# Patient Record
Sex: MALE
[De-identification: names, ages, dates, MRNs, and addresses within clinical notes are randomized per-mention and may not be internally consistent; named-entity substitution may affect disease eponyms.]

---

## 2021-04-14 ENCOUNTER — HOSPITAL ENCOUNTER (EMERGENCY)
Dept: HOSPITAL 97 - ER | Age: 56
Discharge: ANOTHER HOSPITAL | End: 2021-04-14
Payer: COMMERCIAL

## 2021-04-14 VITALS — OXYGEN SATURATION: 94 %

## 2021-04-14 VITALS — SYSTOLIC BLOOD PRESSURE: 129 MMHG | DIASTOLIC BLOOD PRESSURE: 89 MMHG

## 2021-04-14 VITALS — TEMPERATURE: 98.7 F

## 2021-04-14 DIAGNOSIS — J91.8: ICD-10-CM

## 2021-04-14 DIAGNOSIS — Z20.822: ICD-10-CM

## 2021-04-14 DIAGNOSIS — J18.9: Primary | ICD-10-CM

## 2021-04-14 DIAGNOSIS — D69.6: ICD-10-CM

## 2021-04-14 LAB
ALBUMIN SERPL BCP-MCNC: 3.2 G/DL (ref 3.4–5)
ALP SERPL-CCNC: 95 U/L (ref 45–117)
ALT SERPL W P-5'-P-CCNC: 28 U/L (ref 12–78)
ANISOCYTOSIS BLD QL: SLIGHT
AST SERPL W P-5'-P-CCNC: 19 U/L (ref 15–37)
BLD SMEAR INTERP: (no result)
BUN BLD-MCNC: 16 MG/DL (ref 7–18)
GLUCOSE SERPLBLD-MCNC: 129 MG/DL (ref 74–106)
HCT VFR BLD CALC: 34.3 % (ref 39.6–49)
INR BLD: 1.42
LYMPHOCYTES # SPEC AUTO: 1.6 K/UL (ref 0.7–4.9)
MACROCYTES BLD QL: SLIGHT
MAGNESIUM SERPL-MCNC: 2.3 MG/DL (ref 1.8–2.4)
MORPHOLOGY BLD-IMP: (no result)
NT-PROBNP SERPL-MCNC: 99 PG/ML (ref ?–125)
PMV BLD: 8.9 FL (ref 7.6–11.3)
POTASSIUM SERPL-SCNC: 3.8 MMOL/L (ref 3.5–5.1)
RBC # BLD: 3.39 M/UL (ref 4.33–5.43)
TROPONIN (EMERG DEPT USE ONLY): < 0.02 NG/ML (ref 0–0.04)

## 2021-04-14 PROCEDURE — 84145 PROCALCITONIN (PCT): CPT

## 2021-04-14 PROCEDURE — 93005 ELECTROCARDIOGRAM TRACING: CPT

## 2021-04-14 PROCEDURE — 99285 EMERGENCY DEPT VISIT HI MDM: CPT

## 2021-04-14 PROCEDURE — 36415 COLL VENOUS BLD VENIPUNCTURE: CPT

## 2021-04-14 PROCEDURE — 85610 PROTHROMBIN TIME: CPT

## 2021-04-14 PROCEDURE — 83605 ASSAY OF LACTIC ACID: CPT

## 2021-04-14 PROCEDURE — 71045 X-RAY EXAM CHEST 1 VIEW: CPT

## 2021-04-14 PROCEDURE — 83735 ASSAY OF MAGNESIUM: CPT

## 2021-04-14 PROCEDURE — 71260 CT THORAX DX C+: CPT

## 2021-04-14 PROCEDURE — 85025 COMPLETE CBC W/AUTO DIFF WBC: CPT

## 2021-04-14 PROCEDURE — 83880 ASSAY OF NATRIURETIC PEPTIDE: CPT

## 2021-04-14 PROCEDURE — 84484 ASSAY OF TROPONIN QUANT: CPT

## 2021-04-14 PROCEDURE — 87040 BLOOD CULTURE FOR BACTERIA: CPT

## 2021-04-14 PROCEDURE — 80048 BASIC METABOLIC PNL TOTAL CA: CPT

## 2021-04-14 PROCEDURE — 80076 HEPATIC FUNCTION PANEL: CPT

## 2021-04-14 NOTE — XMS REPORT
Continuity of Care Document

                            Created on:2021



Patient:TAQUERIA BELCHER

Sex:Male

:1965

External Reference #:[id number]





Demographics







                          Address                   [address] [place] [address]

 

                          Home Phone                [phone]

 

                          Mobile Phone              [phone]

 

                          Email Address             [email or web address]

 

                          Preferred Language        English

 

                          Marital Status            Unknown

 

                          Taoist Affiliation     Unknown

 

                          Race                      Unknown

 

                          Additional Race(s)        Unavailable



                                                    White

 

                          Ethnic Group               or 









Author







                          Organization              [facility]

t

 

                          Address                   [address]



                                                    [place] [address]

 

                          Phone                     [phone]









Support







                Name            Relationship    Address         Phone

 

                Oralia Belcher  Spouse          Unavailable     [phone]









Care Team Providers







                    Name                Role                Phone

 

                    Po, [facility] Attending Clinician Unavailable









Problems

This patient has no known problems.



Allergies, Adverse Reactions, Alerts

This patient has no known allergies or adverse reactions.



Medications

This patient has no known medications.



Procedures

This patient has no known procedures.



Encounters







        Start   End     Encounter Admission Attending Care    Care    Encounter 

Source



        Date/Time Date/Time Type    Type    Clinicians Facility Department ID   

   

 

        2020 Urgent          Pob1, Acute [facility]    [id number]

[id number] 



        10:46:27 11:06:27 [facility]  [id number]       

  



                                                [place] [id number]         



                                                [place] [phone]         



                                                nal     044             



                                                Office                  



                                                Building                 



                                                One                     







Results

This patient has no known results.

## 2021-04-14 NOTE — ER
Nurse's Notes                                                                                     

 [facility]                                                                 

Name: Corby Cotter                                                                               

Age: 56 yrs                                                                                       

Sex: Male                                                                                         

: 1965                                                                                   

MRN: [id number]                                                                                   

Arrival Date: 2021                                                                          

Time: 11:10                                                                                       

Account#: [id number]                                                                            

Bed 2                                                                                             

Private MD: Quan Sheirff T                                                                        

Diagnosis: Large Loculated pleural effusion suspicious for empyema;Pneumonia, unspecified         

  organism;Thrombocytopenia, unspecified                                                          

                                                                                                  

Presentation:                                                                                     

                                                                                             

11:23 Chief complaint: Patient states: L lateral trunk pain since Saturday night. Thought he  ll1 

      might have coughed or sneezed when the pain started. Pain and SOB has gotten worse          

      since. Sent by Dr. Sheriff for further eval. Coronavirus screen: Client denies travel out      

      of the [place] in the last 14 days. At this time, the client does not indicate any             

      symptoms associated with coronavirus-19. Ebola Screen: Patient denies travel to an          

      Ebola-affected area in the 21 days before illness onset. Initial Sepsis Screen: Does        

      the patient meet any 2 criteria? HR > 90 bpm. No. Patient's initial sepsis screen is        

      negative. Does the patient have a suspected source of infection? Yes: Other: Trunk/lung     

      pain/SOB. Risk Assessment: Do you want to hurt yourself or someone else? Patient            

      reports no desire to harm self or others. Onset of symptoms was April 10, 2021.             

11:23 Method Of Arrival: Ambulatory                                                           [facility] 

11:23 Acuity: NIURKA 2                                                                           ll1 

                                                                                                  

Historical:                                                                                       

- Allergies:                                                                                      

11:25 No Known Allergies;                                                                     ll1 

- PMHx:                                                                                           

11:25 flomax to help him pee;                                                                 ll1 

- PSHx:                                                                                           

11:25 None;                                                                                   ll1 

                                                                                                  

- Immunization history:: Client reports receiving the 2nd dose of the Covid vaccine,              

  Flu vaccine is not up to date.                                                                  

- Social history:: Smoking status: Patient denies any tobacco usage or history of.                

                                                                                                  

                                                                                                  

Screenin:05 Abuse screen: Denies threats or abuse. Denies injuries from another. Nutritional        iw  

      screening: No deficits noted. Tuberculosis screening: No symptoms or risk factors           

      identified. Fall Risk IV access (20 points).                                                

                                                                                                  

Assessment:                                                                                       

11:54 General: Appears uncomfortable, ill, Behavior is cooperative. Pain: Complains of pain   iw  

      in left lateral aspect of the back Pain currently is 8 out of 10 on a pain scale.           

      Neuro: Level of Consciousness is awake, alert, obeys commands, Oriented to person,          

      place, time, situation, Moves all extremities. Cardiovascular: Patient's skin is warm       

      and dry. Respiratory: Respiratory effort is labored, Respiratory pattern is tachypnea.      

      Derm: Skin is healthy with good turgor, Skin is pale. Musculoskeletal: Range of motion:     

      intact in all extremities.                                                                  

12:52 Reassessment: pt requesting pain medication and water, pt advised that he is NPO,       pam Hunt NP notified about pain, verbal order for morphine 4 mg IVP and zofran 4mg IVP,     

      given now.                                                                                  

14:00 Reassessment: Patient appears in no apparent distress at this time. Patient and/or      iw  

      family updated on plan of care and expected duration. Pain level reassessed.                

      Respiratory: Respiratory effort is even, labored, Respiratory pattern is tachypnea.         

18:00 Reassessment: pt states having increased pain to left back, more so when he coughs,     pam Rutledge NP notified, order for Dilaudid 2 mg IVPB over 1 hour and Phenergan 6.25 mg        

      IVP, started now.                                                                           

20:32 Reassessment: report given to  EMS. patient in stable condition, IV cannula patent.   mg2 

                                                                                                  

Vital Signs:                                                                                      

11:23  / 86; Pulse 136; Resp 28; Temp 98.6; Pulse Ox 95% ; Weight 112.49 kg; Height 6   ll1 

      ft. 3 in. (190.50 cm); Pain 9/10;                                                           

12:53  / 84; Pulse 129; Resp 24 S; Pulse Ox 98% on R/A;                                 iw  

13:16  / 84; Pulse 135; Resp 24; Pulse Ox 96% ;                                         sv  

14:08  / 91; Pulse 143; Resp 24; Temp 98.7(TE); Pulse Ox 99% ;                          iw  

15:35  / 74; Pulse 126; Resp 30 S; Pulse Ox 96% on R/A;                                 iw  

18:28  / 88; Pulse 131; Resp 26 S; Pulse Ox 94% on R/A;                                 iw  

20:32  / 89; Pulse 130; Resp 26; Pulse Ox 94% on 4 lpm NC;                              mg2 

11:23 Body Mass Index 31.00 (112.49 kg, 190.50 cm)                                            ll1 

                                                                                                  

ED Course:                                                                                        

11:10 Patient arrived in ED.                                                                  mr  

11:10 Quan Sheriff MD is Private Physician.                                                   mr  

11:25 Triage completed.                                                                       ll1 

11:25 Arm band placed on Patient placed in an exam room, on a stretcher.                      ll1 

11:30 Arina Beckford, MAXINE is Primary Nurse.                                                   iw  

11:31 Maty Cabrera FNP-C is PHCP.                                                        kb  

11:31 Valdemar Russo MD is Attending Physician.                                              kb  

11:47 XRAY Chest (1 view) In Process Unspecified.                                             EDMS

11:47 X-ray completed. Portable x-ray completed in exam room. Patient tolerated procedure     mh1 

      well.                                                                                       

11:55 Inserted saline lock: 20 gauge in left antecubital area, using aseptic technique.       iw  

12:40 CT Chest W/ Con In Process Unspecified.                                                 EDMS

13:43 initiated transfer to [facility].                                                   bd  

13:49 pt was denied due to [facility] and [facility] being at capacity at this time. bd  

13:53 initiated transfer to [facility].                                                   bd  

14:03 pt denied due to entire [facility] system being on transfer closure.                           bd  

15:11 initiated transfer to [facility].                                                      bd  

15:34 yvonne from [facility] call to ask for "2 15 min extentions".                                bd  

15:35 initiated transfer to [facility].                                              bd  

16:07 pt denied at [facility], [facility] and [facility] due to being at capacity at this      bd  

      time. per Yvonne. Yvonne will check [facility] for capacity.                              

16:10 pt denied at [facility] due to no capacity, per Yvonne.                                     bd  

16:32 initiated transfer to [facility].                                            bd  

16:47 pt denied at [facility] due to no capacity, per Yvonne.                                bd  

18:35 spoke with Bjorn at [facility], waiting on a PCU bed.                    bd  

19:26 administrative approval given by Eloisa Mcguire/ patient has been accepted to [facility] 

       7 [facility] 4/ Dr. Bauman accepted the patient in transfer/ report to be called to     

      [phone].                                                                               

20:32 Patient has correct armband on for positive identification.                             mg2 

20:32 No provider procedures requiring assistance completed. Patient transferred, IV remains  mg2 

      in place.                                                                                   

                                                                                                  

Administered Medications:                                                                         

12:54 Drug: morphine 4 mg Route: IVP; Site: left antecubital;                                 iw  

13:30 Follow up: Response: No adverse reaction                                                iw  

12:55 Drug: Zofran (Ondansetron) 4 mg Route: IVP; Site: left antecubital;                     iw  

13:30 Follow up: Response: No adverse reaction                                                iw  

14:18 Drug: Zosyn 3.375 grams Route: IVPB; Infused Over: 60 mins; Site: left antecubital;     iw  

15:18 Follow up: IV Status: Completed infusion                                                iw  

14:18 Drug: NS 0.9% 1000 ml Route: IV; Rate: 1000 ml; Site: left antecubital;                 iw  

15:18 Follow up: IV Status: Completed infusion                                                iw  

14:32 Drug: NS 0.9% 1000 ml Route: IV; Rate: 1000 ml; Site: left antecubital;                 iw  

15:32 Follow up: IV Status: Completed infusion                                                iw  

15:00 Drug: vancoMYCIN 1 grams Route: IVPB; Infused Over: 2 hrs; Site: left wrist;            iw  

17:00 Follow up: IV Status: Completed infusion                                                iw  

15:34 Drug: morphine 4 mg Route: IVP; Site: left antecubital;                                 iw  

16:00 Follow up: Response: No adverse reaction; Pain is decreased                             iw  

15:34 Drug: Zofran (Ondansetron) 4 mg Route: IVP; Site: left antecubital;                     iw  

16:00 Follow up: Response: No adverse reaction                                                iw  

17:59 Drug: Dilaudid (HYDROmorphone) 2 mg Route: IVP; Infused Over: 1 hrs; Site: left         iw  

      antecubital;                                                                                

18:00 Drug: Phenergan 6.25 mg Route: IVP; Site: left antecubital;                             iw  

19:13 Follow up: Response: No adverse reaction                                                iw  

                                                                                                  

                                                                                                  

Outcome:                                                                                          

13:40 ER care complete, transfer ordered by MD. persaud  

20:32 Transferred by ground EMS to [facility], Transfer form completed.             mg2 

20:32 Condition: stable                                                                           

20:32 Instructed on the need for transfer, Demonstrated understanding of instructions.            

20:33 Patient left the ED.                                                                    mg2 

                                                                                                  

Signatures:                                                                                       

Dispatcher [employer]                           EDMS                                                 

Maty Cabrera, [place] MCMILLAN-Cary Lua Stephanie, RN RN                                                      

Belinda Reina                                 mr                                                   

Patricia Mota                               1                                                  

Arina Beckford RN RN                                                      

Gabby Martin                            2                                                  

Mekhi Kilgore, RN                    RN   mg2                                                  

Kathryn Churchill RN                       RN   ll1                                                  

                                                                                                  

Corrections: (The following items were deleted from the chart)                                    

14:25 14:08  / 91; Pulse 143bpm; Resp 24bpm; Pulse Ox 99%; sv                           iw  

19:09 18:28  / 88; Pulse 131bpm; Resp 16bpm; Pulse Ox 94% RA; iw                        iw  

                                                                                                  

**************************************************************************************************

## 2021-04-14 NOTE — EDPHYS
Physician Documentation                                                                           

 [facility]                                                                 

Name: Corby Cotter                                                                               

Age: 56 yrs                                                                                       

Sex: Male                                                                                         

: 1965                                                                                   

MRN: [id number]                                                                                   

Arrival Date: 2021                                                                          

Time: 11:10                                                                                       

Account#: [id number]                                                                            

Bed 2                                                                                             

Private MD: Quan Sheriff T                                                                        

ED Physician Valdemar Russo                                                                       

HPI:                                                                                              

                                                                                             

13:39 This 56 yrs old  Male presents to ER via Ambulatory with complaints of Back     kb  

      Pain.                                                                                       

13:40 The patient has shortness of breath at rest. Onset: The symptoms/episode began/occurred kb  

      5 day(s) ago. Duration: The symptoms are continuous. The patient's shortness of breath      

      is aggravated by exertion, light activity, is alleviated by rest. Associated signs and      

      symptoms: Pertinent positives: chest pain. Severity of symptoms: At their worst the         

      symptoms were severe in the emergency department the symptoms are unchanged. The            

      patient has not experienced similar symptoms in the past. The patient has been recently     

      seen by a physician: the patient's primary care provider. Pt reports he started having      

      left, lower chest pain on Saturday night, then shortness of breath. States he couldn't      

      get into a comfortable position, was having pain with all movement and breathing. Went      

      to PCP on Monday and was given antiinflammatories and muscle relaxers. Chest x-ray was      

      ordered but pt states "I didn't have it done because I could barely walk at that point      

      and thought it would get better with the meds." Shortness of breath and pain continued      

      to worsen. .                                                                                

                                                                                                  

Historical:                                                                                       

- Allergies:                                                                                      

11:25 No Known Allergies;                                                                     ll1 

- PMHx:                                                                                           

11:25 flomax to help him pee;                                                                 ll1 

- PSHx:                                                                                           

11:25 None;                                                                                   ll1 

                                                                                                  

- Immunization history:: Client reports receiving the 2nd dose of the Covid vaccine,              

  Flu vaccine is not up to date.                                                                  

- Social history:: Smoking status: Patient denies any tobacco usage or history of.                

                                                                                                  

                                                                                                  

ROS:                                                                                              

13:37 Constitutional: Negative for fever, chills, and weight loss, Abdomen/GI: Negative for   kb  

      abdominal pain, nausea, vomiting, diarrhea, and constipation, MS/Extremity: Negative        

      for injury and deformity, Skin: Negative for injury, rash, and discoloration, Neuro:        

      Negative for headache, weakness, numbness, tingling, and seizure.                           

13:37 Cardiovascular: Positive for chest pain, of the left lateral anterior chest and left        

      lateral posterior chest.                                                                    

13:37 Respiratory: Positive for orthopnea, pleurisy, shortness of breath.                         

                                                                                                  

Exam:                                                                                             

13:38 Head/Face:  Normocephalic, atraumatic. Chest/axilla:  Normal chest wall appearance and  kb  

      motion.  Cardiovascular:  Regular rate and rhythm with a normal S1 and S2.  No gallops,     

      murmurs, or rubs.  No pulse deficits. Abdomen/GI:  Soft, non-tender. No distention          

      Skin:  Warm, dry with normal turgor.  Normal color. MS/ Extremity:  Pulses equal, no        

      cyanosis.  Neurovascular intact.  Full, normal range of motion. Neuro:  Awake and           

      alert, GCS 15, oriented to person, place, time, and situation. Moves all extremities.       

      Normal gait.                                                                                

13:38 Respiratory: moderate respiratory distress is noted,  Respirations: labored breathing,      

      that is moderate, Breath sounds: decreased breath sounds, that are severe, are heard in     

      the  left lower lobe and left posterior lower lobe.                                         

18:04 ECG was reviewed by the Attending Physician.                                            kb  

                                                                                                  

Vital Signs:                                                                                      

11:23  / 86; Pulse 136; Resp 28; Temp 98.6; Pulse Ox 95% ; Weight 112.49 kg; Height 6   ll1 

      ft. 3 in. (190.50 cm); Pain 9/10;                                                           

12:53  / 84; Pulse 129; Resp 24 S; Pulse Ox 98% on R/A;                                 iw  

13:16  / 84; Pulse 135; Resp 24; Pulse Ox 96% ;                                         sv  

14:08  / 91; Pulse 143; Resp 24; Temp 98.7(TE); Pulse Ox 99% ;                          iw  

15:35  / 74; Pulse 126; Resp 30 S; Pulse Ox 96% on R/A;                                 iw  

18:28  / 88; Pulse 131; Resp 26 S; Pulse Ox 94% on R/A;                                 iw  

20:32  / 89; Pulse 130; Resp 26; Pulse Ox 94% on 4 lpm NC;                              mg2 

11:23 Body Mass Index 31.00 (112.49 kg, 190.50 cm)                                            ll1 

                                                                                                  

MDM:                                                                                              

11:31 Patient medically screened.                                                               

13:21 Data reviewed: vital signs, nurses notes. Data interpreted: Pulse oximetry: on room air kb  

      is 96 %. Interpretation: normal. Physician consultation: Miguelangel Amin MD was             

      contacted at 13:21, regarding consult, patient's condition, after a discussion of the       

      case, a recommendation for transfer for higher level of care is made, recommended zosyn     

      and vancomycin now, transfer to hospital with thoracic surgery .                            

13:38 Counseling: I had a detailed discussion with the patient and/or guardian regarding: the kb  

      historical points, exam findings, and any diagnostic results supporting the                 

      discharge/admit diagnosis, lab results, radiology results, the need to transfer to          

      another facility, [facility] does not immediately have the required       

      specialist.                                                                                 

17:20 ED course: I have discussed pt condition with Haydee Black and Brennan.          kb  

      Recommendation is transfer. Transfer has been denied to [facility], [facility], [facility], [facility]. Dr Hubbard is in touch with Dr Griffin (transfer      

      center medical director for [facility]) to find placement for pt..                            

17:32 ED course: Thoracic surgeon at [facility] [place] accepts pt for consult. Awaiting hospitalist call.   kb  

17:54 ED course: Dr Mattson (hospitalist at [facility]) accepts pt for transfer to PCU or ICU   kb  

      bed. Does not want pt on the tele floor.                                                    

                                                                                                  

                                                                                             

11:36 Order name: Basic Metabolic Panel                                                         

                                                                                             

11:36 Order name: CBC with Diff; Complete Time: 12:51                                         kb  

                                                                                             

11:36 Order name: LFT's                                                                         

                                                                                             

11:36 Order name: Magnesium                                                                     

                                                                                             

11:36 Order name: NT PRO-BNP; Complete Time: 12:14                                              

                                                                                             

11:36 Order name: PT-INR; Complete Time: 12:28                                                  

                                                                                             

11:36 Order name: Troponin (emerg Dept Use Only); Complete Time: 12:14                        kb  

                                                                                             

11:36 Order name: Basic Metabolic Panel; Complete Time: 12:14                                 EDMS

                                                                                             

11:37 Order name: Liver (Hepatic) Function; Complete Time: 12:14                              EDMS

                                                                                             

11:37 Order name: Magnesium; Complete Time: 12:14                                             EDMS

                                                                                             

12:14 Order name: CBC Smear Scan; Complete Time: 12:51                                        EDMS

                                                                                             

13:12 Order name: Lactate                                                                       

                                                                                             

13:12 Order name: Procalcitonin                                                               kb  

                                                                                             

13:12 Order name: Blood Culture Adult (2)                                                     kb  

                                                                                             

11:36 Order name: XRAY Chest (1 view); Complete Time: 12:15                                   kb  

                                                                                             

11:49 Order name: CT Chest W/ Con; Complete Time: 13:10                                       kb  

                                                                                             

13:12 Order name: Lactate; Complete Time: 14:07                                               EDMS

                                                                                             

13:12 Order name: Procalcitonin; Complete Time: 14:46                                         EDMS

                                                                                             

14:44 Order name: SARS-COV-2 RT PCR; Complete Time: 14:46                                     EDMS

                                                                                             

11:36 Order name: EKG; Complete Time: 11:37                                                   kb  

                                                                                             

11:36 Order name: Cardiac monitoring; Complete Time: 11:50                                    kb  

                                                                                             

11:36 Order name: EKG - Nurse/Tech; Complete Time: 11:50                                      kb  

                                                                                             

11:36 Order name: IV Saline Lock; Complete Time: 11:50                                        kb  

                                                                                             

11:36 Order name: Labs collected and sent; Complete Time: 11:50                               kb  

                                                                                             

11:36 Order name: O2 Per Protocol; Complete Time: 11:50                                       kb  

                                                                                             

11:36 Order name: O2 Sat Monitoring; Complete Time: 11:50                                     kb  

                                                                                                  

EC:04 Rate is 134 beats/min. Rhythm is regular. QRS Axis is Normal. [place] interval is normal at  kb  

      132 msec. QRS interval is normal at 68 msec. QT interval is normal at 282 msec.             

                                                                                                  

Administered Medications:                                                                         

12:54 Drug: morphine 4 mg Route: IVP; Site: left antecubital;                                 iw  

13:30 Follow up: Response: No adverse reaction                                                iw  

12:55 Drug: Zofran (Ondansetron) 4 mg Route: IVP; Site: left antecubital;                     iw  

13:30 Follow up: Response: No adverse reaction                                                iw  

14:18 Drug: Zosyn 3.375 grams Route: IVPB; Infused Over: 60 mins; Site: left antecubital;     iw  

15:18 Follow up: IV Status: Completed infusion                                                iw  

14:18 Drug: NS 0.9% 1000 ml Route: IV; Rate: 1000 ml; Site: left antecubital;                 iw  

15:18 Follow up: IV Status: Completed infusion                                                iw  

14:32 Drug: NS 0.9% 1000 ml Route: IV; Rate: 1000 ml; Site: left antecubital;                 iw  

15:32 Follow up: IV Status: Completed infusion                                                iw  

15:00 Drug: vancoMYCIN 1 grams Route: IVPB; Infused Over: 2 hrs; Site: left wrist;            iw  

17:00 Follow up: IV Status: Completed infusion                                                iw  

15:34 Drug: morphine 4 mg Route: IVP; Site: left antecubital;                                 iw  

16:00 Follow up: Response: No adverse reaction; Pain is decreased                             iw  

15:34 Drug: Zofran (Ondansetron) 4 mg Route: IVP; Site: left antecubital;                     iw  

16:00 Follow up: Response: No adverse reaction                                                iw  

17:59 Drug: Dilaudid (HYDROmorphone) 2 mg Route: IVP; Infused Over: 1 hrs; Site: left         iw  

      antecubital;                                                                                

18:00 Drug: Phenergan 6.25 mg Route: IVP; Site: left antecubital;                             iw  

19:13 Follow up: Response: No adverse reaction                                                iw  

                                                                                                  

                                                                                                  

Disposition:                                                                                      

04/15                                                                                             

07:27 Co-signature as Attending Physician, Valdemar Russo MD I agree with the assessment and   kdr 

      plan of care.                                                                               

                                                                                                  

Disposition:                                                                                      

21 13:40 Transfer ordered to [facility]. Diagnosis are Large     

  Loculated pleural effusion suspicious for empyema, Pneumonia, unspecified                       

  organism, Thrombocytopenia, unspecified.                                                        

- Reason for transfer: Higher level of care.                                                      

- Accepting physician is [facility].                                                                

- Condition is Fair.                                                                              

- Problem is new.                                                                                 

- Symptoms are unchanged.                                                                         

                                                                                                  

                                                                                                  

                                                                                                  

Signatures:                                                                                       

Dispatcher [employer]                           [facility]                                                 

Maty Cabrera, MELISSAP-C                 FNP-CkValdemar Pagan MD MD   [facility]                                                  

Arina Beckford RN RN                                                      

Mekhi Kilgore, MAXINE GOODMAN   mg2                                                  

Kathryn Churchill RN                       RN   ll1                                                  

                                                                                                  

Corrections: (The following items were deleted from the chart)                                    

                                                                                             

13:52 13:33 CORONAVIRUS+MR.LAB.BRZ ordered. [facility]

17:34 13:40 2021 13:40 Transfer ordered to [facility]. Diagnosis is Large Loculated    kb  

      pleural effusion suspicious for empyema; Pneumonia, unspecified organism;                   

      Thrombocytopenia, unspecified. Reason for transfer: Higher level of care. Accepting         

      physician is [facility]. Condition is Fair. Problem is new. Symptoms are unchanged. kb            

20:33 17:34 2021 13:40 Transfer ordered to [facility].       mg2 

      Diagnosis is Large Loculated pleural effusion suspicious for empyema; Pneumonia,            

      unspecified organism; Thrombocytopenia, unspecified. Reason for transfer: Higher level      

      of care. Accepting physician is [facility]. Condition is Fair. Problem is new. Symptoms       

      are unchanged. kb                                                                           

                                                                                                  

**************************************************************************************************

## 2021-04-14 NOTE — RAD REPORT
EXAM DESCRIPTION:  RAD - Chest Single View - 4/14/2021 11:46 am

 

CLINICAL HISTORY:  Chest pain;Dyspnea

 

COMPARISON:  None

 

TECHNIQUE:  AP portable chest image was obtained 4/14/2021 11:46 am .

 

FINDINGS:  Lung volumes are low limiting evaluation. Left-sided pleural opacification is present from
 pleural thickening or pleural effusion. Lung base atelectasis is present. Exam is labeled as expirat
ion study. No pneumothorax is identified. Small right pleural effusion is likely present as well.

 

Masslike density in the left upper mediastinum is probably aortic arch distorted by expiration, melody
ble technique and rotation. No acute bony abnormality seen. No acute aortic findings suspected.

 

IMPRESSION:  Limited shallow inspiration exam showing left base pleural and parenchymal opacification
. In the acute clinical setting this is likely pleural effusion and atelectasis.

 

Widening of the mediastinum may be the affects of expiration and portable technique. Mass of the medi
astinum is not excluded.

 

No pneumothorax.

 

Follow-up CT chest imaging is recommended for further evaluation.

## 2021-04-15 NOTE — EKG
Test Date:    2021-04-14               Test Time:    11:36:59

Technician:   MT                                     

                                                     

MEASUREMENT RESULTS:                                       

Intervals:                                           

Rate:         134                                    

CA:           132                                    

QRSD:         68                                     

QT:           282                                    

QTc:          421                                    

Axis:                                                

P:            29                                     

CA:           132                                    

QRS:          26                                     

T:            89                                     

                                                     

INTERPRETIVE STATEMENTS:                                       

                                                     

Sinus tachycardia

Possible Left atrial enlargement

Septal infarct, age undetermined

Abnormal ECG

No previous ECG available for comparison



Electronically Signed On 04-15-21 07:34:47 CDT by Darwin Jorge

## 2021-06-11 ENCOUNTER — HOSPITAL ENCOUNTER (OUTPATIENT)
Dept: HOSPITAL 97 - DS | Age: 56
Discharge: HOME | End: 2021-06-11
Attending: INTERNAL MEDICINE
Payer: COMMERCIAL

## 2021-06-11 VITALS — OXYGEN SATURATION: 98 % | SYSTOLIC BLOOD PRESSURE: 112 MMHG | TEMPERATURE: 98.6 F | DIASTOLIC BLOOD PRESSURE: 67 MMHG

## 2021-06-11 VITALS — BODY MASS INDEX: 30.4 KG/M2

## 2021-06-11 DIAGNOSIS — D69.6: Primary | ICD-10-CM

## 2021-06-11 LAB
ANISOCYTOSIS BLD QL: (no result)
BLD SMEAR INTERP: (no result)
HCT VFR BLD CALC: 23.2 % (ref 39.6–49)
LYMPHOCYTES # SPEC AUTO: 2.9 K/UL (ref 0.7–4.9)
MACROCYTES BLD QL: (no result)
MORPHOLOGY BLD-IMP: (no result)
PMV BLD: 7.8 FL (ref 7.6–11.3)
PMV BLD: 8.3 FL (ref 7.6–11.3)
RBC # BLD: 2.2 M/UL (ref 4.33–5.43)

## 2021-06-11 PROCEDURE — 86850 RBC ANTIBODY SCREEN: CPT

## 2021-06-11 PROCEDURE — 85025 COMPLETE CBC W/AUTO DIFF WBC: CPT

## 2021-06-11 PROCEDURE — 36415 COLL VENOUS BLD VENIPUNCTURE: CPT

## 2021-06-11 PROCEDURE — 86901 BLOOD TYPING SEROLOGIC RH(D): CPT

## 2021-06-11 PROCEDURE — 86900 BLOOD TYPING SEROLOGIC ABO: CPT

## 2021-06-11 PROCEDURE — 85049 AUTOMATED PLATELET COUNT: CPT

## 2021-06-11 PROCEDURE — 36430 TRANSFUSION BLD/BLD COMPNT: CPT

## 2021-07-29 ENCOUNTER — HOSPITAL ENCOUNTER (OUTPATIENT)
Dept: HOSPITAL 97 - DS | Age: 56
Discharge: HOME | End: 2021-07-29
Attending: INTERNAL MEDICINE
Payer: COMMERCIAL

## 2021-07-29 VITALS — DIASTOLIC BLOOD PRESSURE: 67 MMHG | TEMPERATURE: 97.6 F | OXYGEN SATURATION: 100 % | SYSTOLIC BLOOD PRESSURE: 120 MMHG

## 2021-07-29 VITALS — BODY MASS INDEX: 30.4 KG/M2

## 2021-07-29 DIAGNOSIS — D69.6: Primary | ICD-10-CM

## 2021-07-29 LAB — PMV BLD: 8.2 FL (ref 7.6–11.3)

## 2021-07-29 PROCEDURE — 85049 AUTOMATED PLATELET COUNT: CPT

## 2021-07-29 PROCEDURE — 86901 BLOOD TYPING SEROLOGIC RH(D): CPT

## 2021-07-29 PROCEDURE — 86900 BLOOD TYPING SEROLOGIC ABO: CPT

## 2021-07-29 PROCEDURE — 71046 X-RAY EXAM CHEST 2 VIEWS: CPT

## 2021-07-29 PROCEDURE — 86850 RBC ANTIBODY SCREEN: CPT

## 2021-07-29 PROCEDURE — 36430 TRANSFUSION BLD/BLD COMPNT: CPT

## 2021-07-29 PROCEDURE — 36415 COLL VENOUS BLD VENIPUNCTURE: CPT

## 2021-07-29 NOTE — RAD REPORT
EXAM DESCRIPTION:  Jr Pa And Lat (2 Views)7/29/2021 9:54 am

 

CLINICAL HISTORY:

 

Chest pain

 

COMPARISON:  May 2021

 

FINDINGS:  Left pleural thickening/small loculated pleural effusion unchanged.

 

Lungs appear clear of acute infiltrate. The heart is normal size

## 2021-08-13 ENCOUNTER — HOSPITAL ENCOUNTER (OUTPATIENT)
Dept: HOSPITAL 97 - DS | Age: 56
Discharge: HOME | End: 2021-08-13
Attending: INTERNAL MEDICINE
Payer: COMMERCIAL

## 2021-08-13 VITALS — DIASTOLIC BLOOD PRESSURE: 75 MMHG | SYSTOLIC BLOOD PRESSURE: 124 MMHG | OXYGEN SATURATION: 99 %

## 2021-08-13 VITALS — TEMPERATURE: 97.3 F

## 2021-08-13 VITALS — BODY MASS INDEX: 30.4 KG/M2

## 2021-08-13 DIAGNOSIS — D69.6: ICD-10-CM

## 2021-08-13 DIAGNOSIS — D64.9: ICD-10-CM

## 2021-08-13 DIAGNOSIS — D61.818: Primary | ICD-10-CM

## 2021-08-13 PROCEDURE — 86900 BLOOD TYPING SEROLOGIC ABO: CPT

## 2021-08-13 PROCEDURE — 86850 RBC ANTIBODY SCREEN: CPT

## 2021-08-13 PROCEDURE — 36430 TRANSFUSION BLD/BLD COMPNT: CPT

## 2021-08-13 PROCEDURE — 86901 BLOOD TYPING SEROLOGIC RH(D): CPT

## 2023-01-11 ENCOUNTER — HOSPITAL ENCOUNTER (EMERGENCY)
Dept: HOSPITAL 97 - ER | Age: 58
Discharge: ANOTHER HOSPITAL | End: 2023-01-11
Payer: COMMERCIAL

## 2023-01-11 VITALS — TEMPERATURE: 97.9 F | SYSTOLIC BLOOD PRESSURE: 99 MMHG | DIASTOLIC BLOOD PRESSURE: 67 MMHG | OXYGEN SATURATION: 99 %

## 2023-01-11 DIAGNOSIS — I48.92: Primary | ICD-10-CM

## 2023-01-11 DIAGNOSIS — Z20.822: ICD-10-CM

## 2023-01-11 LAB
ALBUMIN SERPL BCP-MCNC: 3.6 G/DL (ref 3.4–5)
ALP SERPL-CCNC: 76 U/L (ref 45–117)
ALT SERPL W P-5'-P-CCNC: 47 U/L (ref 16–61)
AST SERPL W P-5'-P-CCNC: 17 U/L (ref 15–37)
BLD SMEAR INTERP: (no result)
BUN BLD-MCNC: 15 MG/DL (ref 7–18)
GLUCOSE SERPLBLD-MCNC: 167 MG/DL (ref 74–106)
HCT VFR BLD CALC: 31.8 % (ref 39.6–49)
INR BLD: 1.17
LYMPHOCYTES # SPEC AUTO: 2.6 K/UL (ref 0.7–4.9)
MAGNESIUM SERPL-MCNC: 2 MG/DL (ref 1.6–2.4)
MCV RBC: 103.6 FL (ref 80–100)
MORPHOLOGY BLD-IMP: (no result)
NT-PROBNP SERPL-MCNC: 555 PG/ML (ref ?–125)
PMV BLD: 7.5 FL (ref 7.6–11.3)
POTASSIUM SERPL-SCNC: 3.7 MMOL/L (ref 3.5–5.1)
RBC # BLD: 3.06 M/UL (ref 4.33–5.43)
TROPONIN I SERPL HS-MCNC: 9.8 PG/ML (ref ?–58.9)

## 2023-01-11 PROCEDURE — 85025 COMPLETE CBC W/AUTO DIFF WBC: CPT

## 2023-01-11 PROCEDURE — 83880 ASSAY OF NATRIURETIC PEPTIDE: CPT

## 2023-01-11 PROCEDURE — 99285 EMERGENCY DEPT VISIT HI MDM: CPT

## 2023-01-11 PROCEDURE — 84484 ASSAY OF TROPONIN QUANT: CPT

## 2023-01-11 PROCEDURE — 85610 PROTHROMBIN TIME: CPT

## 2023-01-11 PROCEDURE — 81003 URINALYSIS AUTO W/O SCOPE: CPT

## 2023-01-11 PROCEDURE — 80048 BASIC METABOLIC PNL TOTAL CA: CPT

## 2023-01-11 PROCEDURE — 96372 THER/PROPH/DIAG INJ SC/IM: CPT

## 2023-01-11 PROCEDURE — 87811 SARS-COV-2 COVID19 W/OPTIC: CPT

## 2023-01-11 PROCEDURE — 71045 X-RAY EXAM CHEST 1 VIEW: CPT

## 2023-01-11 PROCEDURE — 93005 ELECTROCARDIOGRAM TRACING: CPT

## 2023-01-11 PROCEDURE — 83735 ASSAY OF MAGNESIUM: CPT

## 2023-01-11 PROCEDURE — 36415 COLL VENOUS BLD VENIPUNCTURE: CPT

## 2023-01-11 PROCEDURE — 80076 HEPATIC FUNCTION PANEL: CPT

## 2023-01-11 NOTE — RAD REPORT
EXAM DESCRIPTION:  RAD - Chest Single View - 1/11/2023 9:14 am

 

CLINICAL HISTORY:  PALPITATIONS

Chest pain.

 

COMPARISON:  Chest Pa And Lat (2 Views) dated 7/29/2021; Chest Pa And Lat (2 Views) dated 5/25/2021; 
Chest Single View dated 4/14/2021

 

FINDINGS:  Portable technique limits examination quality.

 

The lungs are grossly clear. The heart is normal in size. No displaced fractures.

 

IMPRESSION:  No acute intrathoracic process suspected.

## 2023-01-11 NOTE — EKG
Test Date:    2023-01-11               Test Time:    08:44:00

Technician:   SHAILESH                                   

                                                     

MEASUREMENT RESULTS:                                       

Intervals:                                           

Rate:         150                                    

DE:           104                                    

QRSD:         174                                    

QT:           332                                    

QTc:          524                                    

Axis:                                                

P:                                                   

DE:           104                                    

QRS:          22                                     

T:            269                                    

                                                     

INTERPRETIVE STATEMENTS:                                       

                                                     

Sinus tachycardia with short DE

Right bundle branch block

T wave abnormality, consider lateral ischemia

Abnormal ECG

Compared to ECG 04/14/2021 11:36:59

Short DE interval now present

Right bundle-branch block now present

T-wave abnormality now present

Possible ischemia now present

Myocardial infarct finding no longer present



Electronically Signed On 01-11-23 14:51:24 CST by Landon Florence

## 2023-01-11 NOTE — ER
Nurse's Notes                                                                                     

 [facility]                                                                 

Name: Corby Cotter                                                                               

Age: 57 yrs                                                                                       

Sex: Male                                                                                         

: 1965                                                                                   

MRN: [id number]                                                                                   

Arrival Date: 2023                                                                          

Time: 08:35                                                                                       

Account#: [id number]                                                                            

Bed 2                                                                                             

Private MD:                                                                                       

Diagnosis: Unspecified atrial flutter                                                             

                                                                                                  

Presentation:                                                                                     

                                                                                             

08:35 Chief complaint: Patient states: "I went to my hematologist yesterday because my        aa5 

      platelets are low and they told me my heart rate was 140 bpm but I didn't think             

      anything of it because I feel fine; today I checked my heart rate and it was 152". Pt       

      denies palpitations, denies chest pain, denies SOB. Reports being on antibiotics for an     

      ear infection.                                                                              

08:35 Coronavirus screen: cough unrelated to allergies. Ebola Screen: Patient denies travel   aa5 

      to an Ebola-affected area in the 21 days before illness onset. Initial Sepsis Screen:       

      Does the patient meet any 2 criteria? HR > 90 bpm. Does the patient have a suspected        

      source of infection? Yes:. Risk Assessment: Do you want to hurt yourself or someone         

      else? Patient reports no desire to harm self or others. Onset of symptoms was January       

      10, 2023.                                                                                   

08:35 Acuity: NIURKA 2                                                                           aa5 

08:35 Method Of Arrival: Ambulatory                                                           aa5 

                                                                                                  

Historical:                                                                                       

- Allergies:                                                                                      

08:47 No Known Allergies;                                                                     aa5 

- Home Meds:                                                                                      

08:47 Flomax 0.4 mg Oral cap [Active];                                                        aa5 

- PMHx:                                                                                           

08:47 MRSA;                                                                                   aa5 

                                                                                                  

- Immunization history:: Adult Immunizations unknown.                                             

- Social history:: Smoking status: Patient denies any tobacco usage or history of.                

                                                                                                  

                                                                                                  

Screenin:59 [facility] ED Fall Risk Assessment (Adult) History of falling in the last 3 months,       ld1 

      including since admission No falls in past 3 months (0 pts). Abuse screen: Denies           

      threats or abuse. Denies injuries from another. Nutritional screening: No deficits          

      noted. Tuberculosis screening: No symptoms or risk factors identified.                      

                                                                                                  

Assessment:                                                                                       

08:59 General: Appears in no apparent distress. comfortable, Behavior is calm, cooperative,   ld1 

      appropriate for age. Pain: Denies pain. Neuro: Corea Agitation-Sedation Scale            

      (RASS): 0 - Alert and Calm Level of Consciousness is awake, alert, obeys commands,          

      Oriented to person, place, time, situation. Cardiovascular: Capillary refill < 3            

      seconds Patient's skin is warm and dry. Rhythm is SVT. Cardiovascular: Denies chest         

      pain. Respiratory: Airway is patent Respiratory effort is even, unlabored. GI: Abdomen      

      is flat, non-distended. : No signs and/or symptoms were reported regarding the            

      genitourinary system. EENT: No signs and/or symptoms were reported regarding the EENT       

      system. Derm: No signs and/or symptoms reported regarding the dermatologic system.          

      Musculoskeletal: No signs and/or symptoms reported regarding the musculoskeletal system.    

09:13 Reassessment: Dr. Sheets notified of critical lab PLT 18,000.                            ss  

10:30 Reassessment: Patient appears in no apparent distress at this time. Patient and/or      ld1 

      family updated on plan of care and expected duration. Pain level reassessed. Patient is     

      alert, oriented x 3, equal unlabored respirations, skin warm/dry/pink.                      

11:45 Reassessment: Patient appears in no apparent distress at this time. No changes from     ld1 

      previously documented assessment. Patient and/or family updated on plan of care and         

      expected duration. Pain level reassessed.                                                   

12:00 Reassessment: ERP at bedside with patient. Pt denies pain.                              ld1 

13:07 Reassessment: No changes from previously documented assessment. Patient and/or family   ld1 

      updated on plan of care and expected duration. Pain level reassessed. Patient is alert,     

      oriented x 3, equal unlabored respirations, skin warm/dry/pink.                             

14:56 Reassessment: Patient appears in no apparent distress at this time. No changes from     ld1 

      previously documented assessment. Patient is alert, oriented x 3, equal unlabored           

      respirations, skin warm/dry/pink. Patient denies pain at this time.                         

15:05 Reassessment: Patient appears in no apparent distress at this time. No changes from     ld1 

      previously documented assessment. Patient and/or family updated on plan of care and         

      expected duration. Pain level reassessed. Patient is alert, oriented x 3, equal             

      unlabored respirations, skin warm/dry/pink. Patient denies pain at this time.               

15:05 Reassessment: EMS at bedside for transportation to [facility]. Pt transported   ld1 

      on pump with Cardizem 10mg/hr. Pt denies pain at this time.                                 

                                                                                                  

Vital Signs:                                                                                      

08:35  / 67; Pulse 151; Resp 18 S; Temp 98.0(TE); Pulse Ox 98% on R/A; Weight 115.21 kg aa5 

      (R); Height 6 ft. 3 in. (190.50 cm) (R);                                                    

08:58  / 67; Pulse 148; Resp 12; Pulse Ox 96% on R/A;                                   ld1 

09:11 Pulse 148;                                                                              ld1 

09:11 Pulse 148;                                                                              ld1 

09:11 BP 90 / 73; Pulse 150; Resp 12; Pulse Ox 94% on R/A; Pain 0/10;                         ld1 

09:22 Pulse 117;                                                                              ld1 

09:27 BP 90 / 73; Pulse 117; Resp 11; Pulse Ox 94% on R/A; Pain 0/10;                         ld1 

09:30  / 70; Pulse 121; Resp 13; Pulse Ox 95% on R/A;                                   ld1 

10:00 BP 92 / 65; Pulse 140; Resp 12; Pulse Ox 97% on R/A;                                    ld1 

10:34 BP 92 / 65; Pulse 141; Resp 12; Pulse Ox 96% on R/A;                                    ld1 

10:48 BP 87 / 67; Pulse 140;                                                                  ld1 

11:20  / 61; Pulse 144; Resp 19; Pulse Ox 96% on R/A; Pain 0/10;                        ld1 

12:06 BP 91 / 75; Pulse 149; Resp 15; Pulse Ox 97% on R/A; Pain 0/10;                         ld1 

12:19 BP 90 / 69; Pulse 151; Resp 14; Pulse Ox 99% on R/A;                                    ld1 

12:30 BP 99 / 81; Pulse 149;                                                                  ld1 

13:00  / 75; Pulse 149;                                                                 vg1 

13:07 BP 88 / 75; Pulse 147; Resp 18; Pulse Ox 96% on R/A;                                    ld1 

13:26 BP 83 / 57; Pulse 147; Resp 15; Pulse Ox 97% on R/A;                                    ld1 

14:22  / 75; Pulse 148; Resp 18; Pulse Ox 98% on R/A; Pain 0/10;                        ld1 

14:56 BP 81 / 57; Pulse 147; Resp 29; Pulse Ox 98% on R/A; Pain 0/10;                         ld1 

15:05 BP 99 / 67; Pulse 149; Resp 18; Temp 97.9(O); Pulse Ox 99% on R/A; Pain 0/10;           ld1 

08:35 Body Mass Index 31.75 (115.21 kg, 190.50 cm)                                            aa5 

                                                                                                  

ED Course:                                                                                        

08:35 Patient arrived in ED.                                                                  as  

08:35 Arm band placed on Patient placed in an exam room, on a stretcher.                      aa5 

08:38 Renita Langley, MAXINE is Primary Nurse.                                                   ld1 

08:40 Masoud Sheets MD is Attending Physician.                                                sp3 

08:46 Triage completed.                                                                       aa5 

08:59 Patient has correct armband on for positive identification. Placed in gown. Bed in low  ld1 

      position. Call light in reach. Side rails up X2. Cardiac monitor on. Pulse ox on. NIBP      

      on. Door closed. Noise minimized. Warm blanket given.                                       

08:59 Inserted saline lock: 20 gauge in left antecubital area, using aseptic technique. Blood ld1 

      collected. Patient maintains SpO2 saturation greater than 95% on room air.                  

09:15 XRAY Chest (1 view) In Process Unspecified.                                             EDMS

09:30 Jozef Espnioza is Hospitalizing Provider.                                               sp3 

09:37 SARS RAPID Sent.                                                                        ld1 

10:29 initiated transfer to [facility].                                              bd  

13:29 pt accepted in transfer to [facility] by dr GUERA Nj admit approval given by     stefan Gonzalez.pt going to [address] [place] [address] bed 1.                                                         

15:22 No provider procedures requiring assistance completed. Patient transferred, IV remains  ld1 

      in place.                                                                                   

                                                                                                  

Administered Medications:                                                                         

08:51 Drug: Adenosine 12 mg Route: IVP; Site: left antecubital;                               ld1 

09:11 Follow up: Pulse 148 bpm; Response: No adverse reaction                                 ld1 

08:54 Drug: Cardizem (diltiazem) 10 mg Route: IVP; Site: left antecubital;                    ld1 

09:11 Follow up: Pulse 148 bpm; Response: No adverse reaction                                 ld1 

09:11 Drug: Cardizem (diltiazem) 10 mg Route: IVP; Site: left antecubital;                    ld1 

09:22 Follow up: Pulse 117 bpm; Response: No adverse reaction                                 ld1 

09:37 Drug: Lovenox (enoxaparin) 120 mg Route: Sub-Q; Site: abdomen;                          ld1 

10:48 Follow up: Response: No adverse reaction                                                ld1 

10:24 Drug: Cardizem (diltiazem) 10 mg Route: IVP; Site: left antecubital;                    vg1 

10:48 Follow up: BP 87 / 67; Pulse 140 bpm; Response: No adverse reaction                     ld1 

10:26 Drug: misc 50 mg Route: IV; Rate: calculated rate; Site: left antecubital;              vg1 

10:51 Follow up: IV Status: Completed infusion; IV Intake: 55ml                               vg1 

12:03 Drug: Cardizem (diltiazem) 5 mg/hr Route: IV; Rate: calculated rate; Site: left         ld1 

      antecubital;                                                                                

12:30 Follow up: BP 99 / 81; Pulse 149 bpm; Response: No adverse reaction; Rate change 5      ld1 

      mg/hr; IV Status: Infusion continued                                                        

13:00 Follow up:  / 75; Pulse 149 bpm; Rate change 10 mg/hr; IV Status: Infusion        vg1 

      continued                                                                                   

12:30 Drug: NS 0.9% 1000 ml Route: IV; Rate: 1 bolus; Site: left antecubital;                 ld1 

                                                                                                  

                                                                                                  

Medication:                                                                                       

08:59 VIS not applicable for this client.                                                     ld1 

                                                                                                  

Intake:                                                                                           

10:51 IV: 55ml; Total: 55ml.                                                                  vg1 

                                                                                                  

Outcome:                                                                                          

09:31 Decision to Hospitalize by Provider.                                                    sp3 

11:58 ER care complete, transfer ordered by MD.                                               sp3 

15:22 Transferred by ground EMS to [facility].                             ld1 

15:22 Condition: stable                                                                           

15:22 Instructed on the need for transfer.                                                        

15:23 Patient left the ED.                                                                    ld1 

                                                                                                  

Signatures:                                                                                       

Dispatcher [employer]                           EDMS                                                 

Cary Mccord Amelia as Calderon, Audri, RN                     RN   aa5                                                  

Ines Del Rio RN RN ss Garcia, Victoria, RN RN   vg1                                                  

Renita Langley RN                     RN   ld1                                                  

Masoud Sheets MD MD   sp3                                                  

                                                                                                  

Corrections: (The following items were deleted from the chart)                                    

10:34 10:33 BP 92 / 65; Pulse 140bpm; Resp 12bpm; Pulse Ox 97% RA; ld1                        ld1 

                                                                                                  

**************************************************************************************************

## 2023-01-11 NOTE — XMS REPORT
Continuity of Care Document

                           Created on:2023



Patient:TAQUERIA BELCHER

Sex:Male

:1965

External Reference #:[id number]





Demographics







                          Address                   [address] [place] [address]

 

                          Home Phone                [phone]

 

                          Work Phone                [phone]

 

                          Mobile Phone              [phone]

 

                          Email Address             [email or web address]

 

                          Preferred Language        English

 

                          Marital Status            Unknown

 

                          Anabaptist Affiliation     Unknown

 

                          Race                      Unknown

 

                          Additional Race(s)        Unavailable



                                                    Unavailable



                                                    White

 

                          Ethnic Group              Unknown









Author







                          Organization              [facility]

t

 

                          Address                   [address]



                                                    [place] [address]

 

                          Phone                     [phone]









Support







                Name            Relationship    Address         Phone

 

                ORIANA BELCHER SP              [id number] [facility] [phone]



                                                [place] [address] 

 

                ORIANA BELCHER SP              Unavailable     [phone]

 

                SHANIKA BELCHER P               Unavailable     [phone]

 

                MITCHELL BELCHER  X               Unavailable     [phone]

 

                Dione Belcher Spouse          Unavailable     [phone]









Care Team Providers







                    Name                Role                Phone

 

                    JESSE LAZARUS BEATRIZ Primary Care Physician Unavailable

 

                    SEAN HEDRICK Attending Clinician Unavailable

 

                    Only, Ang Db Test   Attending Clinician Unavailable

 

                    Leanna Dimas Attending Clinician [phone]

 

                    LEANNA BARAJAS   Attending Clinician Unavailable

 

                    Doctor Unassigned, No Name Attending Clinician Unavailable

 

                    KSENIA CARPENTER       Attending Clinician Unavailable

 

                    OLENA JADE Attending Clinician Unavailable

 

                    Olena Jade Attending Clinician [phone]

 

                    SEAN HEDRICK        Attending Clinician Unavailable

 

                    TIM HILLMAN Attending Clinician Unavailable

 

                    [facility] Attending Clinician Unavailable

 

                    Tra Osborne  Attending Clinician [phone]

 

                    TRA LORENZO      Attending Clinician Unavailable

 

                    SEAN HEDRICK Admitting Clinician Unavailable









Payers







           Payer Name Policy Type Policy Number Effective Date Expiration Date S

sania

 

           [employer] PPO POS EPO            [id number] 2020 00:00:00         

   



           CHOICE                                                 

 

           PPO/EPO - [employer]            [id number] 2020 00:00:00           

 

 

           CVCP-[employer]BS             [id number]                       







Problems







       Condition Condition Condition Status Onset  Resolution Last   Treating Co

mments 

Source



       Name   Details Category        Date   Date   Treatment Clinician        



                                                 Date                 

 

       PANCTOPENI  PANCTOPEN Diagnosis Active 2021          

     [facility]



       A      IA Active                         08:45:00               l



              2021               00:00:                             Nabil

[facility]               00                                 



              [place]                                                  

 

       Paroxysmal Paroxysmal Disease Active                              B

[facility]



       atrial atrial                                              [place]



       fibrillati fibrillati               00:00:                             of



       on with on with               00                                 Medicin



       RVR    RVR                                                     e



       ([facility] ([facility])                                                  

 

       Acute  Acute  Disease Active                              [facility]



       hypoxemic hypoxemic               4-21                               [facility]

s



       respirator respirator               00:00:                             Me

dical



       y failure y failure               00                                 Cent

er

 

       Benign Benign Disease Active                       Overview: [facility]



       prostatic prostatic               4-15                        [facility]

[facility]



       hyperplasi hyperplasi               00:00:                      g of this

 Medical



       a with a with               00                          note   Center



       urinary urinary                                           might be 



       retention retention                                           different 



                                                               from the 



                                                               original. 



                                                               On Flomax 



                                                               at home 

 

       Former Former Disease Active                       Overview: [facility]



       smoker smoker               4-15                        [facility]



                                   00:00:                      g of this Medical



                                   00                          note   Center



                                                               might be 



                                                               different 



                                                               from the 



                                                               original. 



                                                               Quit 3 



                                                               months 



                                                               ago    

 

       L empyema L empyema Disease Active                              [facility]



       s/p L VATS s/p L VATS               4-15                               [place]

kes



       washout/de washout/de               00:00:                             Me

dical



       corticatio corticatio               00                                 Ce

nter



       n      n                                                       



       2021                                                  

 

       Thrombocyt Thrombocyt Disease Active                              B

[facility]



       openia openia               4-14                               [place]



       ([facility] ([facility])               00:00:                             of



                                   00                                 Medicin



                                                                      e

 

       Tachycardi Tachycardi Disease Active                              C

[facility]



       a      a                    4-14                               [facility]



                                   00:00:                             Medical



                                   00                                 Center

 

       Thrombocyt Thrombocyt Disease Active                              C

[facility]



       openia openia               4-14                               [facility]



                                   00:00:                             Medical



                                   00                                 Center

 

       Pleural Pleural Disease Active                              [facility]



       effusion, effusion,               4-14                               [facility]

s



       left   left                 00:00:                             Medical



                                   00                                 Center

 

       No known No known Disease                                           [facility]

di



       active active                                                  st



       problems problems                                                  Hospit

a



                                                                      l

 

       Hemodynami Hemodynami Disease Active                                    C

[facility]



       c      c                                                       [facility]



       instabilit instabilit                                                  Me

dical



       y      y                                                       Center

 

       Respirator Respirator Disease Active                                    C

[facility]



       y      y                                                       [facility]



       insufficie insufficie                                                  Me

dical



       ncy    ncy                                                     Center

 

       Paroxysmal Paroxysmal Disease Active                                    C

[facility]



       atrial atrial                                                  [facility]



       fibrillati fibrillati                                                  Me

dical



       on with on with                                                  Center



       RVR    RVR                                                     

 

       Urinary Urinary Disease Active                                    [facility]



       retention retention                                                  [facility]







Allergies, Adverse Reactions, Alerts







       Allergy Allergy Status Severity Reaction(s) Onset  Inactive Treating Comm

ents 

Source



       Name   Type                        Date   Date   Clinician        

 

       NO KNOWN Allergy Active                                           [facility]



       ALLERGIE                                                         [facility]

 

       NO KNOWN Drug   Active                                           [facility]



       ALLERGIE Class                                                   ity of



       S                                                              [facility]







Social History







           Social Habit Start Date Stop Date  Quantity   Comments   Source

 

           Exposure to                       Yes                   [facility]



           SARS-CoV-2 (event)                                             [facility]

 

           History of tobacco                       Current smoker            Me

[facility]ist



           use                                                    Hospital

 

           Alcohol intake 2021 Current drinker            [facility]

dist



                      00:00:00   00:00:00   of alcohol            Hospital



                                            (finding)             

 

           Cigarettes smoked 2021                       [facility]



           current (pack per 00:00:00   00:00:00                         Medicin

e



           day) - Reported                                             

 

           Cigarette  2021                       [facility]

 of



           pack-years 00:00:00   00:00:00                         Medicine

 

           History [facility] 2021 3                     [facility]



           Alcohol Frequency 00:00:00   00:00:00                         Medical

 Center

 

           History SDOH 2021 4                     [facility]



           Alcohol Std Drinks 00:00:00   00:00:00                         Medica

l Center

 

           History [facility] 2021 4                     [facility]



           Alcohol Binge 00:00:00   00:00:00                         Medical [facility]

ter

 

           Alcohol Comment 2021 accdg to wife            [facility]

 [facility]



                      00:00:00   00:00:00   and sister            Medical Center



                                            drinks 1 bottle            



                                            of wine weekly.            

 

           Tobacco Comment 2021 neftaly sweets 3            [facility]



                      00:00:00   00:00:00   a day accdg to            Medical Ce

nter



                                            wife and sister.            

 

           Tobacco use and 2020 Never used            Universit

y of



           exposure   00:00:00   00:00:00                         [facility]

 

           Sex Assigned At 1965                       [facility]



           Birth      00:00:00   00:00:00                         Hospital









                Smoking Status  Start Date      Stop Date       Source

 

                Social History  2021 13:20:32 2021 13:20:32 [facility]

 [place]

 

                Current every day 2021 00:00:00                 [facility]



                smoker                                          Center







Medications







       Ordered Filled Start  Stop   Current Ordering Indication Dosage Frequency

 Signature

                    Comments            Components          Source



     Medication Medication Date Date Medication? Clinician                (SIG) 

          



     Name Name                                                   

 

     tamsulosin            Yes            .8mg QD   Take 0.8           Met

hodi



     (Flomax)      8-18                               mg by           st



     0.4 mg      13:29:                               mouth           Hospita



     capsule      17                                 daily.           l

 

     MethylPREDN            Yes                      See            [facility]

a



     ISolone      6-16                               Instructio           l



     Dose Pack 4      13:28:                               ns, PO,           Her

bates



     mg oral      00                                 Daily, Use           



     tablet                                         as             



                                                  directed           



                                                  on label.,           



                                                  # 21 tab,           



                                                  0              



                                                  Refill(s)           

 

     MethylPREDN            Yes                      See            [facility]

a



     ISolone      6-16                               Instructio           l



     Dose Pack 4      13:28:                               ns, PO,           Her

bates



     mg oral      00                                 Daily, Use           



     tablet                                         as             



                                                  directed           



                                                  on label.,           



                                                  # 21 tab,           



                                                  0              



                                                  Refill(s)           

 

     Flomax            Yes                      0.4 mg,           [facility]



               6-16                               PO, Daily,           l



               02:00:                               0              [place]



               00                                 Refill(s)           

 

     Flomax            Yes                      0.4 mg,           [facility]



               6-16                               PO, Daily,           l



               02:00:                               0              [place]



               00                                 Refill(s)           

 

     metoprolol            Yes                      TAKE           [facility]



     (LOPRESSOR)      -27                               ONE-HALF           [facility]

ege



     25 MG      00:00:                               (1/2)           of



     tablet      00                                 TABLET(S)           [facility]



                                                  BY MOUTH           e



                                                  EVERY           



                                                  EIGHT           



                                                  HOURS.           

 

     digoxin            Yes            125ug QD   Take 1           [facility]



     (LANOXIN)      27                               tablet           [facility]



     0.125 MG      00:00:                               (125 mcg           Medic

al



     tablet      00                                 total) by           Center



                                                  mouth           



                                                  daily.           

 

     digoxin            Yes                      TAKE ONE           [facility]



     (LANOXIN)      427                               (1)            [facility]



     125 MCG      00:00:                               TABLET(S)           of



     tablet      00                                 BY MOUTH           [facility]



                                                  DAILY.           e

 

     amiodarone            Yes                      TAKE ONE           [facility]



     (PACERONE)                                     (1)            [facility]



     200 MG      00:00:                               TABLET(S)           of



     tablet      00                                 BY MOUTH           [facility]



                                                  TWICE A           e



                                                  DAY.           

 

     doxycycline            Yes                      TAKE ONE           [facility]

[facility]



     (MONODOX)      4-26                               (1)            [facility]



     100 MG      00:00:                               CAPSULE(S)           of



     capsule      00                                 BY MOUTH           [facility]



                                                  EVERY           e



                                                  TWELVE           



                                                  HOURS FOR           



                                                  21 DAYS.           

 

     Tamsulosin            Yes            .4mg      Take 0.4           [facility]

[facility]



     HCl 0.4 MG      4-26                               mg by           [facility]



     CAPS      00:00:                               mouth           of



               00                                 daily.           [facility]



                                                                 e

 

     metoprolol            Yes            12.5mg      Take 0.5           C

[facility]



     tartrate      4-26                               tablets           [facility]



     (LOPRESSOR)      00:00:                               (12.5 mg           Me

dical



     25 MG      00                                 total) by           Center



     tablet                                         mouth           



                                                  every 8           



                                                  (eight)           



                                                  hours.           

 

     tamsulosin            Yes            .4mg QD   Take 1           [facility]

t



     (FLOMAX)      4-26                               capsule           [facility]



     0.4 mg Cap      00:00:                               (0.4 mg           Medi

jasmine



     24 hr      00                                 total) by           Center



     capsule                                         mouth           



                                                  daily.           

 

     cyclobenzap            Yes                      as needed.           

[facility]



     linus      4-12                                              [place]



     (FLEXERIL)      00:00:                                              of



     10 MG      00                                                Medicin



     tablet                                                        e

 

     tamsulosin      0      Yes                      Take by           Univ

ers



     HCl (FLOMAX      6-29                               mouth.           ity of



     ORAL)      16:03:                                              [address]

 

     tamsulosin      -0      Yes                      Take by           Univ

ers



     HCl (FLOMAX      6-29                               mouth.           ity of



     ORAL)      11:03:                                              [address]

 

     tamsulosin      2020-0      Yes                      Take by           Univ

ers



     HCl (FLOMAX      6-29                               mouth.           ity of



     ORAL)      11:03:                                              [address]

 

     benzonatate      -0 2020- No        [id number] 100mg      Take 1         

  Univers



     (TESSALON      6-29 07-14                          capsule by           ity

 of



     PERLES) 100      00:00: 04:59                          mouth 3           Te

[place]



     mg capsule      00   :00                           (three)           Medica

l



                                                  times           [place]



                                                  daily for           



                                                  14 days.           







Vital Signs







             Vital Name   Observation Time Observation Value Comments     Source

 

             WEIGHT       2021 16:00:00 108.5 kg                  

 

             WEIGHT       2021 05:30:00 117 kg                    

 

             WEIGHT       2021 04:21:00 116.6 kg                  

 

             WEIGHT       2021 02:38:00 115 kg                    

 

             WEIGHT       2021 04:00:00 112.2 kg                  

 

             HEIGHT       2021 22:00:00 190.5 cm                  

 

             WEIGHT       2021 22:00:00 112.49 kg                 

 

             Systolic blood 2021 18:19:00 101 mm[Hg]                [facility]                                            Medicine

 

             Diastolic blood 2021 18:19:00 59 mm[Hg]                 [facility]                                            Medicine

 

             Heart rate   2021 18:19:00 87 /min                   [facility]

 

             Body temperature 2021 18:19:00 36.78 Katlyn                 [facility]

 

             Body height  2021 18:19:00 190.5 cm                  [facility]

 

             Body weight  2021 18:19:00 108.863 kg                [facility]

 

             BMI          2021 18:19:00 30.00 kg/m2               [facility]

 

             WEIGHT       2021 16:00:00 108.5 kg                  

 

             WEIGHT       2021 05:30:00 117 kg                    

 

             WEIGHT       2021 04:21:00 116.6 kg                  

 

             WEIGHT       2021 02:38:00 115 kg                    

 

             WEIGHT       2021 04:00:00 112.2 kg                  

 

             HEIGHT       2021 22:00:00 190.5 cm                  

 

             WEIGHT       2021 22:00:00 112.49 kg                 

 

             Systolic blood 2020 15:59:00 108 mm[Hg]                Univer

sity of



             pressure                                            [facility]

 

             Diastolic blood 2020 15:59:00 76 mm[Hg]                 Unive

rsity of



             pressure                                            [facility]

 

             Heart rate   2020 15:59:00 90 /min                   Universi

ty of



                                                                 [facility]

 

             Body temperature 2020 15:59:00 36.44 Katlyn                 Univ

ersity of



                                                                 [facility]

 

             Respiratory rate 2020 15:59:00 18 /min                   Univ

ersity of



                                                                 [facility]

 

             Body height  2020 15:59:00 193 cm                    Universi

ty [facility]

 

             Body weight  2020 15:59:00 116.484 kg                Universi

ty of



                                                                 [facility]

 

             BMI          2020 15:59:00 31.26 kg/m2               Universi

ty [facility]

 

             Oxygen saturation in 2020 15:59:00 97 /min                   

University of



             Arterial blood by                                        [facility]

[facility]



             Pulse oximetry                                        [facility]

 

             Systolic blood 2020 15:59:00 108 mm[Hg]                Univer

sity of



             pressure                                            [facility]

 

             Diastolic blood 2020 15:59:00 76 mm[Hg]                 Unive

rsity of



             pressure                                            [facility]

 

             Heart rate   2020 15:59:00 90 /min                   Universi

ty [facility]

 

             Body temperature 2020 15:59:00 36.44 Katlyn                 [facility]

 

             Respiratory rate 2020 15:59:00 18 /min                   [facility]

[facility]

 

             Body height  2020 15:59:00 193 cm                    Universi

[facility]

 

             Body weight  2020 15:59:00 116.484 kg                Universi

[facility]

 

             BMI          2020 15:59:00 31.26 kg/m2               Universi

[facility]

 

             Oxygen saturation in 2020 15:59:00 97 /min                   

[facility]



             Arterial blood by                                        [facility]

j[facility]



             Pulse oximetry                                        Branch

 

             Height       2021 13:05:00 190.5 cm                  [facility]

 [place]

 

             Weight       2021 13:05:00                           [facility]

 [place]

 

             BMI Calculated 2021 13:05:00                           Ebony Morales







Procedures







                Procedure       Date / Time Performed Performing Clinician [facility]

e

 

                ASSIGNMENT OF BENEFITS 2022 23:58:36 Doctor Unassigned, No

 [facility]







Plan of Care







             Planned Activity Planned Date Details      Comments     Source

 

             Future Scheduled 2023   DEPRESSION SCREENING              [facility]



             Test         00:00:00     (12+) [code =              Medical Center



                                       DEPRESSION SCREENING              



                                       (12+)]                    

 

             Future Scheduled 2022   COVID-19 VACCINE (#1)              [facility]



             Test         23:23:39     [code = COVID-19              



                                       VACCINE (#1)]              

 

             Future Scheduled 2022   Hepatitis C screening              [facility]



             Test         23:23:39     (procedure) [code =              



                                       597794493]                

 

             Future Scheduled 2022   COLONOSCOPY SCREENING              [facility]



             Test         23:23:39     [code = COLONOSCOPY              



                                       SCREENING]                

 

             Future Scheduled 2022   SHINGLES VACCINES (1              Met

[facility]



             Test         23:23:39     of 2) [code = SHINGLES              



                                       VACCINES (1 of 2)]              

 

             Future Scheduled 2022   INFLUENZA VACCINE              [facility]

[facility] Hospital



             Test         23:23:39     [code = INFLUENZA              



                                       VACCINE]                  

 

             Future Scheduled 2022   INFLUENZA VACCINE (#1)              [facility]

[facility]



             Test         00:00:00     [code = INFLUENZA              Medical Ce

nter



                                       VACCINE (#1)]              

 

             Future Scheduled 2021-05-10   Screening for              [facility]

lege of



             Test         00:03:44     malignant neoplasm of              Medici

ne



                                       colon (procedure)              



                                       [code = 516111123]              

 

             Future Scheduled 2021-05-10   TETANUS SHOT (ADULT)              [facility] of



             Test         00:03:44     [code = TETANUS SHOT              Medicin

e



                                       (ADULT)]                  

 

             Future Scheduled 2021-05-10   COVID-19 Vaccine (1)              [facility] of



             Test         00:03:44     [code = COVID-19              Medicine



                                       Vaccine (1)]              

 

             Future Scheduled 2021-05-10   ZOSTER VACCINE (1 of              [facility]



             Test         00:03:44     2) [code = ZOSTER              Medicine



                                       VACCINE (1 of 2)]              

 

             Future Scheduled 2021-05-10   FLU VACCINE > 6 MONTHS              B

[facility] of



             Test         00:03:44     [code = FLU VACCINE >              Medici

ne



                                       6 MONTHS]                 

 

             Future Scheduled 2021-05-10   BMI FOLLOW UP PLAN              [facility]



             Test         00:03:44     [code = BMI FOLLOW UP              Medici

ne



                                       PLAN]                     

 

             Future Scheduled 2017   SHINGLES VACCINES (1              [facility]



             Test         00:00:00     of 2) [code = SHINGLES              Medic

al Center



                                       VACCINES (1 of 2)]              

 

             Future Scheduled 2002   Lipid panel               [facility]

s



             Test         00:00:00     (procedure) [code =              Medical 

Center



                                       08019030]                 

 

             Future Scheduled 1986   DTAP/TDAP/TD VACCINES              [facility]



             Test         00:00:00     (1 - Tdap) [code =              Medical C

enter



                                       DTAP/TDAP/TD VACCINES              



                                       (1 - Tdap)]               

 

             Future Scheduled 1979   Tobacco Cessation              [facility]



             Test         00:00:00     Counseling and              Medical Cente

r



                                       Screening (12+) [code              



                                       = Tobacco Cessation              



                                       Counseling and              



                                       Screening (12+)]              

 

             Future Scheduled 1973   PNEUMOCOCCAL VACCINE              [facility]



             Test         00:00:00     0-64 YRS (1 - PCV)              Medical C

enter



                                       [code = PNEUMOCOCCAL              



                                       VACCINE 0-64 YRS (1 -              



                                       PCV)]                     

 

             Future Scheduled 1967   COVID-19 VACCINE (#1)              [facility]

[facility]



             Test         00:00:00     [code = COVID-19              Medical Gurinder

ter



                                       VACCINE (#1)]              

 

             Future Scheduled 1967   CT Colonography              [facility]

[facility]



             Test         00:00:00     (combo) [code = CT              Medical C

enter



                                       Colonography (combo)]              

 

             Future Scheduled 1967   Screening for              [facility]

es



             Test         00:00:00     malignant neoplasm of              Medica

l Center



                                       colon (procedure)              



                                       [code = 685504697]              

 

             Future Scheduled 1967   Screening for              [facility]

es



             Test         00:00:00     malignant neoplasm of              Medica

l Center



                                       colon (procedure)              



                                       [code = 082655166]              

 

             Future Scheduled 1967   Screening for              [facility]

es



             Test         00:00:00     malignant neoplasm of              Medica

l Center



                                       colon (procedure)              



                                       [code = 891367667]              

 

             Future Scheduled 1967   Screening for              [facility]

es



             Test         00:00:00     malignant neoplasm of              Medica

l Center



                                       colon (procedure)              



                                       [code = 288171245]              

 

             Future Scheduled 1967   Sigmoidoscopy [code =              [facility]



             Test         00:00:00     Sigmoidoscopy]              Medical Cente

r







Encounters







        Start   End     Encounter Admission Attending Care    Care    Encounter 

Source



        Date/Time Date/Time Type    Type    Clinicians Facility Department ID   

   

 

        2021         Inpatient ER      [facility],  SLE    [place] [id number]

[id number] [facility]



        21:41:00                         SEAN keys               

 

        2022 Laboratory         Only, Ang Db Test [facility]    1.2.8

40.114 [id number] 

Univers



        18:30:00 18:45:00 Only            [employer]  [id number] 

        [facility] [id number]         Dave

as



                                                YAMILE?BLEA [phone]         [address]



                                                MEDICAL                 



                                                OFFICE                  



                                                BUILDING                 

 

        2022 Outpatient R       [facility]    [id number]

[id number] Univers



        18:30:00 18:16:27                 [facility]                         ity [facility]

 

        2022 Orders          Doctor  MONI    [id number]

[id number] Univers



        00:00:00 00:00:00 Only            Unassigned, MARISABEL   [id number]       

  ity of



                                        [facility] [id number]         Dave

as



                                                        [phone]         Medi

jasmine



                                                        009             Branch

 

        2021 Outpatient                 BCM     [facility]     [id number]

[id number] [facility]



        00:00:00 23:59:00                                                 Colleg

e



                                                                        of



                                                                        Medicin



                                                                        e

 

        2021 [facility]         [facility]     [id number]

[address] [place]



        00:00:00 00:00:00                 PREMAL                  517     Method

i



                                                                        st

 

        2021 Outpatient                 BCM     [facility]     [id number]

0 [facility]



        11:45:00 23:59:00                                                 Colleg

e



                                                                        of



                                                                        Medicin



                                                                        e

 

        2021 Outpatient                 [id number]

[id number] [facility]



        13:03:00 14:50:00                         r       [place] 00      l



                                                        [facility]         

 

        2021 Outpatient                 [facility] 4902

[id number] Mem[facility]



        13:03:00 14:50:00                         r       [place] 00      [facility]         

 

        2021 Outpatient         MARIBETH JADE    MED     750

[id number]    [facility]



        08:03:00 09:50:00                 OLENA                           

 

        2021 Outpatient         KOTA Jade    [facility]    [id number]

[id number] 



        08:03:00 09:50:00                 Olena                   00      



                                        Celia                         

 

        2021 Office          GEOVANI HEDRICK     [id number] [id number]

[id number] [facility]



        12:00:33 15:12:31 Visit           SEAN   AMBULATOR 350.1.13.21         

College



                                                Y       [id number]         of



                                                        [phone]         Medi

merrill



                                                        810             e

 

        2021 Outpatient         [facility]    [id number]

636 [facility]



        00:00:00 00:00:00                 TIM                           

 

        2020 Urgent          Pob1, Acute [facility]    1.2.840.114 [id number]

[id number] 



        10:46:27 11:06:27 Care            [facility]  350.1.13.10       

  



                                                [place] [id number]         



                                                [facility] [phone]         



                                                nal     044             



                                                Office                  



                                                Building                 



                                                One                     

 

        2020 Urgent          Pob1, Acute Care [facility]    1.

2.840.114 [id number] 

Univers



        10:46:27 11:06:27 [facility]  350.1.13.10    

     ity of



                                                [place] [id number]         Dave

as



                                                [employer] [phone]         Me

dical



                                                nal     044             Branch



                                                Office                  



                                                Building                 



                                                One                     

 

        2020 [facility] R       [facility]    [id number]

[id number] Univers



        10:40:00 10:40:00                 TRA escalera [facility]







Results







           Test Description Test Time  Test Comments Results    Result Comments 

Source









                    HEMATOLOGY          2021 13:08:00 









                      Test Item  Value      Reference Range Interpretation Comme

nts









             Hct (test code = Hct) 25.7         42.0-54.0                 



[facility]KgxasuwNHMOJKVYJZ6668-67-41 13:08:00





             Test Item    Value        Reference Range Interpretation Comments

 

             MCV (test code = MCV) 108.0        80.0-94.0                 



[facility] HospitalWndvmfgLKROMEKPDL5638-70-39 13:08:00





             Test Item    Value        Reference Range Interpretation Comments

 

             MCH (test code = MCH) 35.9 pg      27.0-31.0                 



[facility] HospitalHygmfobJHIXUVVDJE1425-54-67 13:08:00





             Test Item    Value        Reference Range Interpretation Comments

 

             MCHC (test code = MCHC) 33.3         32.0-36.0                 



[facility] HospitalKgydlkfFTMGTAFCSA1905-16-58 13:08:00





             Test Item    Value        Reference Range Interpretation Comments

 

             RDW (test code = RDW) 21.4         11.5-14.5                 



[facility] HospitalLvuscuoSHXVAYKMIH2404-75-50 13:08:00





             Test Item    Value        Reference Range Interpretation Comments

 

             Platelet (test code = Platelet) 29           133-450               

    



[facility] HospitalOhhwahjFCLNZHVSKR7580-96-56 13:08:00





             Test Item    Value        Reference Range Interpretation Comments

 

             MPV (test code = MPV) 7.7          7.4-10.4                  



[facility] HospitalHwphqtqEENFVEFFCD2436-15-55 13:08:00





             Test Item    Value        Reference Range Interpretation Comments

 

             Macrocyte (test code = 1+ *ABN*(21                           



             Macrocyte)   8:08 AM)                               



[facility] HospitalMnzhiiwDZBGTBPAAW0985-28-87 13:08:00





             Test Item    Value        Reference Range Interpretation Comments

 

             Plt Morph (test code = Normal (21 8:08                        

   



             Plt Morph)   AM)                                    



[facility] HospitalXqahmtqPVBCSEJQZO4229-31-46 13:08:00





             Test Item    Value        Reference Range Interpretation Comments

 

             Segs (test code = Segs) 37.0         45.0-75.0                 



[facility] HospitalQnmqwgmZTXFUYITZP3075-07-40 13:08:00





             Test Item    Value        Reference Range Interpretation Comments

 

             Lymphocytes (test code = Lymphocytes) 54.0         20.0-40.0       

          



[facility] HospitalRnuyytqRHVVDOYIYB2213-10-73 13:08:00





             Test Item    Value        Reference Range Interpretation Comments

 

             Monocytes (test code = Monocytes) 9.0          2.0-12.0            

      



[facility] HospitalLztxxddHDHQCPBRWH7741-25-81 13:08:00





             Test Item    Value        Reference Range Interpretation Comments

 

             Neutrophils # (test code = Neutrophils 1.8          1.5-8.1        

           



             #)                                                  



[facility] HospitalVhuifrgSTSGNHMNBN6702-26-04 13:08:00





             Test Item    Value        Reference Range Interpretation Comments

 

             Lymphocytes # (test code = Lymphocytes 2.6          1.0-5.5        

           



             #)                                                  



[facility] HospitalKoltdiaRSEWBBOSMP3219-06-02 13:08:00





             Test Item    Value        Reference Range Interpretation Comments

 

             Monocytes # (test code 0.4          See_Comment                [Aut

omated message] The



             = Monocytes #)                                        system which 

generated



                                                                 this result tra

nsmitted



                                                                 reference range

: <=0.8.



                                                                 The reference r

carla was



                                                                 not used to int

erpret



                                                                 this result as



                                                                 normal/abnormal

.



[facility] HospitalZmychkeBDVLBFUFVB9214-90-30 13:08:00





             Test Item    Value        Reference Range Interpretation Comments

 

             Tot Cell Ct (test code = Tot Cell Ct) 100 1                        

          



[facility] HospitalMxpmxuuPFALRBVJIH3204-03-31 13:08:00





             Test Item    Value        Reference Range Interpretation Comments

 

             Retic Auto (test code = Retic Auto) 6.0          0.5-1.5           

        



[facility] HospitalUshjzjeWIKAVLZPXV4708-08-28 13:08:00





             Test Item    Value        Reference Range Interpretation Comments

 

             WBC (test code = WBC) 4.8          3.7-10.4                  



[facility] HospitalLlvfwghSLTKIOWSMG3522-81-71 13:08:00





             Test Item    Value        Reference Range Interpretation Comments

 

             RBC (test code = RBC) 2.38         4.70-6.10                 



[facility] TlzjyutLAUQDKRINR3606-64-54 13:08:00





             Test Item    Value        Reference Range Interpretation Comments

 

             Hgb (test code = Hgb) 8.6          14.0-18.0                 



[facility] HnxwdhnYHMKMLBXEL2595-41-55 13:08:0025.7[facility]ann[facility]
2021 13:08:66178.0[facility] RzqjsvtMFBNVMNCGO5316-90-22 13:08:00





             Test Item    Value        Reference Range Interpretation Comments

 

             MCH (test code = MCH) 35.9 pg      27.0-31.0                 



[facility] ElkiqwdLEFALHTQSJ7512-62-16 13:08:0033.3[facility]ann[facility]MATOLOGY
2021 13:08:0021.4[facility] ZllqvocVIYRTBWNMZ4761-22-66 13:08:0029[facility] 
KdxavhcNKVIQGZHYH4219-45-66 13:08:007.7[facility] XgxndxvLTIJXMXCOW6724-88-44 
13:08:001+ *ABN*(21 8:08 AM)[facility] UvjbymcBACZWJKJKM5050-16-18 13:08:00
Normal (21 8:08 AM)[facility] JfqrtbjSTJGVQKHTJ3998-85-99 13:08:0037.0
[facility] YeqirhcSLDXMIAJCH7996-31-95 13:08:0054.0[facility]ann[facility]OLOG[facility]
2021 13:08:009.0[facility] YelisxrFHIGOEBTNT6055-00-68 13:08:001.8[facility]orial 
JdxxxjdYCCPHFEZLT9314-20-24 13:08:002.6[facility] HfcagghGCOZQYJKCR2624-98-97 
13:08:000.4[facility] QnwalyyNKKLNVGXMF8751-92-99 13:08:00





             Test Item    Value        Reference Range Interpretation Comments

 

             Tot Cell Ct (test code = Tot Cell Ct) 100 1                        

          



[facility] TehonjxEQQVQOTDSR6352-46-02 13:08:006.0[facility] Hermann[facility]MATOLOGY
2021 13:08:004.8[facility] [id number] 13:08:002.38[facility] 
[id number] 13:08:008.6[facility]annAFB C[facility] + SMEAR 
(NON-SPUTUM)2021 10:28:00





             Test Item    Value        Reference Range Interpretation Comments

 

             CULTURE (BEAKER) (test No acid-fast bacilli                        

   



             code = 1095) isolated in 42 days                           

 

             AFB SMEAR (BEAKER) No acid fast bacilli                           



             (test code = 994) seen                                   



AFB CULTURE + SMEAR (NON-SPUTUM)2021 10:28:00





             Test Item    Value        Reference Range Interpretation Comments

 

             CULTURE (BEAKER) (test No acid-fast bacilli                        

   



             code = 1095) isolated in 42 days                           

 

             AFB SMEAR (BEAKER) No acid fast bacilli                           



             (test code = 994) seen                                   



AFB CULTURE + SMEAR (NON-SPUTUM)2021 10:28:00





             Test Item    Value        Reference Range Interpretation Comments

 

             CULTURE (BEAKER) (test No acid-fast bacilli                        

   



             code = 1095) isolated in 42 days                           

 

             AFB SMEAR (BEAKER) No acid fast bacilli                           



             (test code = 994) seen                                   



AFB CULTURE + SMEAR (NON-SPUTUM)2021 10:28:00





             Test Item    Value        Reference Range Interpretation Comments

 

             CULTURE (BEAKER) (test No acid-fast bacilli                        

   



             code = 1095) isolated in 42 days                           

 

             AFB SMEAR (BEAKER) No acid fast bacilli                           



             (test code = 994) seen                                   



AFB CULTURE + SMEAR (NON-SPUTUM)2021 10:28:00





             Test Item    Value        Reference Range Interpretation Comments

 

             CULTURE (BEAKER) (test No acid-fast bacilli                        

   



             code = 1095) isolated in 42 days                           

 

             AFB SMEAR (BEAKER) No acid fast bacilli                           



             (test code = 994) seen                                   



AFB CULTURE + SMEAR (NON-SPUTUM)2021 10:28:00





             Test Item    Value        Reference Range Interpretation Comments

 

             CULTURE (BEAKER) (test No acid-fast bacilli                        

   



             code = 1095) isolated in 42 days                           

 

             AFB SMEAR (BEAKER) No acid fast bacilli                           



             (test code = 994) seen                                   



AFB CULTURE + SMEAR (NON-SPUTUM)2021 10:28:00





             Test Item    Value        Reference Range Interpretation Comments

 

             CULTURE (BEAKER) (test No acid-fast bacilli                        

   



             code = 1095) isolated in 42 days                           

 

             AFB SMEAR (BEAKER) No acid fast bacilli                           



             (test code = 994) seen                                   



AFB CULTURE + SMEAR (NON-SPUTUM)2021 10:28:00





             Test Item    Value        Reference Range Interpretation Comments

 

             CULTURE (BEAKER) (test No acid-fast bacilli                        

   



             code = 1095) isolated in 42 days                           

 

             AFB SMEAR (BEAKER) No acid fast bacilli                           



             (test code = 994) seen                                   



FUNGUS CULTURE + [id number] 23:16:00





             Test Item    Value        Reference Range Interpretation Comments

 

             CULTURE (BEAKER) (test No fungus isolated in                       

    



             code = 1095) 28 days                                

 

             FUNGUS SMEAR (BEAKER) No fungi seen                           



             (test code = 1406)                                        



FUNGUS CULTURE + [id number] 23:16:00





             Test Item    Value        Reference Range Interpretation Comments

 

             CULTURE (BEAKER) (test No fungus isolated in                       

    



             code = 1095) 28 days                                

 

             FUNGUS SMEAR (BEAKER) No fungi seen                           



             (test code = 1406)                                        



FUNGUS CULTURE + [id number] 23:16:00





             Test Item    Value        Reference Range Interpretation Comments

 

             CULTURE (BEAKER) (test No fungus isolated in                       

    



             code = 1095) 28 days                                

 

             FUNGUS SMEAR (BEAKER) No fungi seen                           



             (test code = 1406)                                        



FUNGUS CULTURE + [id number] 23:15:00





             Test Item    Value        Reference Range Interpretation Comments

 

             CULTURE (BEAKER) (test No fungus isolated in                       

    



             code = 1095) 28 days                                

 

             FUNGUS SMEAR (BEAKER) No fungi seen                           



             (test code = 1406)                                        



FUNGUS CULTURE + [id number] 23:15:00





             Test Item    Value        Reference Range Interpretation Comments

 

             CULTURE (BEAKER) (test No fungus isolated in                       

    



             code = 1095) 28 days                                

 

             FUNGUS SMEAR (BEAKER) No fungi seen                           



             (test code = 1406)                                        



FUNGUS CULTURE + [id number] 23:15:00





             Test Item    Value        Reference Range Interpretation Comments

 

             CULTURE (BEAKER) (test No fungus isolated in                       

    



             code = 1095) 28 days                                

 

             FUNGUS SMEAR (BEAKER) No fungi seen                           



             (test code = 1406)                                        



FUNGUS CULTURE + [id number] 23:15:00





             Test Item    Value        Reference Range Interpretation Comments

 

             CULTURE (BEAKER) (test No fungus isolated in                       

    



             code = 1095) 28 days                                

 

             FUNGUS SMEAR (BEAKER) No fungi seen                           



             (test code = 1406)                                        



FUNGUS CULTURE + [id number] 23:15:00





             Test Item    Value        Reference Range Interpretation Comments

 

             CULTURE (BEAKER) (test No fungus isolated in                       

    



             code = 1095) 28 days                                

 

             FUNGUS SMEAR (BEAKER) No fungi seen                           



             (test code = 1406)                                        



RAD, CHEST, 2 [id number] 12:10:00Reason for Exam:-&gt;Z09 (ICD-10-CM) - S/P
lung surgery, follow-up exam
********************************************************[facility] 1965  Sex: 
M************************************************************FINAL REPORT 
PATIENT ID: [id number] EXAM: PA and lateral views of the chest. COMPARISON: Chest 
radiograph 2021 CLINICAL HISTORY: Z09 (ICD-10-CM) - S/P lung surgery, 
follow-up exam FINDINGS: Lines/tubes: LeftIJ central venous catheter has been 
removed. Lungs: The lungs are well inflated. Postsurgical changes in the left 
lower lung with persistent subsegmental atelectasis in the left lower lobe. 
Improved reticular opacities throughout the left lung suggestive of improving 
volume overload. Right lung is well expanded and clear. Pleura: Unchanged small 
left pleural effusion. No right pleural effusion. No pneumothorax. Heart and 
mediastinum: The cardiomediastinal silhouette is normal. Bones and soft tissues:
Mild wedge compression deformity of L2 vertebral body seen on lateral view, 
likely chronic. Upper abdomen: Mild elevation of the left hemidiaphragm. 
IMPRESSION: Stable small left pleural effusion withadjacent atelectasis. 
Improved reticular opacities throughout the left lung, which may represent impr
froilan asymmetric edema. Signed: Orin Waters Verified 
Date/Time: 2021 12:10:47 Reading Location: [facility] Reading Room [address] [place], [place] [address]Electronically signed by: ORIN BLAIR M.D. on 2021 
12:10 PMSARS-COV2/RT-PCR ([facility])2021 07:53:00





             Test Item    Value        Reference Range Interpretation Comments

 

             SARS-COV2/RT-PCR (test Negative     Not Detected, Negative,        

      



             code = 5084058)              See external report for              



                                       linked test               

 

             SARS-COV-2 PERFORMING LAB [facility]                             



             (test code = 6763642)                                        



Negative result for this test determines that SARS-CoV-2 RNA was not present in 
the specimen above the Limit of Detection (LOD). However, Negative results do 
not preclude SARS-CoV-2 infection and should not be used as the sole basis for 
treatment or patient management decisions. Negative results must be combined 
with clinical observations, patient history, and epidemiological information. A 
false negative result may occur if a specimen is improperly collected, 
transported or handled. A false negative result should be considered if 
patient's recent exposures or clinical presentation indicate that COVID-19 
(SARS-CoV-2) is likely and diagnostic tests for other causes of illness are 
negative. Re-testing should be considered in cases of suspected false 
negatives.The limit of detection for this assay is 800 copies/mL.This SARS CoV-2
test is a real-time RT-PCR test intended for the qualitative detection of 
nucleic acid from SARS-CoV-2 in a nasopharyngeal swab specimen collected from 
individuals suspected of COVID-19 by their healthcare provider.This test has not
been Food and Drug Administration (FDA) cleared or approved. This is a modified 
version of an approved Emergency Use Authorization (EUA) and is in the process 
of review by the FDA. Once authorized by the FDA, the issued EUA will be 
effective until the declaration that circumstances exist justifying the 
authorization of the emergency use ofin vitro diagnostic tests for detection 
and/or diagnosis of COVID-19 is terminated under Section 564(b)(2) of the Act or
the EUA is revoked under Section 564(g) of the Act.Fact Sheet for Healthcare 
Prov
iders:https://www.[employer]/sites/default/files/product/documents/Fact_Sheet_HC

_Wlhnoxdtq_Ljsw_DDFO-RtJ-0.pdfFact Sheet for Healthcare 
Patients:https://www.[employer]/sites/default/files/product/docume
nts/Tgqp_Zfjzb_Eltqdkwt_Noha_TMJR-WhG-6.pdfPerforming Laboratory:[facility], [place] [address]MNO W/PLT COUNT &amp; 
AUTO [id number] 07:06:00





             Test Item    Value        Reference Range Interpretation Comments

 

             WHITE BLOOD CELL COUNT (BEAKER) 4.6 K/ L     3.5-10.5              

    



             (test code = 775)                                        

 

             RED BLOOD CELL COUNT (BEAKER) 2.42 M/ L    4.63-6.08    L          

  



             (test code = 761)                                        

 

             HEMOGLOBIN (BEAKER) (test code = 7.7 GM/DL    13.7-17.5    L       

     



             410)                                                

 

             HEMATOCRIT (BEAKER) (test code = 23.5 %       40.1-51.0    L       

     



             411)                                                

 

             MEAN CORPUSCULAR VOLUME (BEAKER) 97.1 fL      79.0-92.2    H       

     



             (test code = 753)                                        

 

             MEAN CORPUSCULAR HEMOGLOBIN 31.8 pg      25.7-32.2                 



             (BEAKER) (test code = 751)                                        

 

             MEAN CORPUSCULAR HEMOGLOBIN CONC 32.8 GM/DL   32.3-36.5            

     



             (BEAKER) (test code = 752)                                        

 

             RED CELL DISTRIBUTION WIDTH 14.4 %       11.6-14.4                 



             (BEAKER) (test code = 412)                                        

 

             PLATELET COUNT (BEAKER) (test code 41 K/CU MM   150-450      L     

       



             = 756)                                              

 

             MEAN PLATELET VOLUME (BEAKER) 10.1 fL      9.4-12.4                

  



             (test code = 754)                                        

 

             NUCLEATED RED BLOOD CELLS (BEAKER) 0 /100 WBC   0-0                

       



             (test code = 413)                                        



(CELLAVISION MANUAL DIFF)2021 07:06:00





             Test Item    Value        Reference Range Interpretation Comments

 

             NEUTROPHILS - REL 49 %                                   



             (CELLAVISION)(BEAKER) (test code =                                 

       



             2816)                                               

 

             LYMPHOCYTES - REL 44 %                                   



             (CELLAVISION)(BEAKER) (test code =                                 

       



             2817)                                               

 

             MONOCYTES - REL 6 %                                    



             (CELLAVISION)(BEAKER) (test code =                                 

       



             2818)                                               

 

             BASOPHILS - REL 1 %                                    



             (CELLAVISION)(BEAKER) (test code =                                 

       



             2820)                                               

 

             NEUTROPHILS - ABS 2.25 K/ul    1.78-5.38                 



             (CELLAVISION)(BEAKER) (test code =                                 

       



             2830)                                               

 

             LYMPHOCYTES - ABS 2.02 K/ul    1.32-3.57                 



             (CELLAVISION)(BEAKER) (test code =                                 

       



             2831)                                               

 

             MONOCYTES - ABS 0.28 K/uL    0.30-0.82    L            



             (CELLAVISION)(BEAKER) (test code =                                 

       



             2832)                                               

 

             BASOPHILS - ABS 0.05 K/uL    0.01-0.08                 



             (CELLAVISION)(BEAKER) (test code =                                 

       



             2835)                                               

 

             TOTAL COUNTED (BEAKER) (test code = 100                            

        



             1351)                                               

 

             WBC MORPHOLOGY (BEAKER) (test code Normal                          

       



             = 487)                                              

 

             PLT MORPHOLOGY (BEAKER) (test code Normal                          

       



             = 486)                                              

 

             ANISOCYTOSIS (BEAKER) (test code = 1+ few                          

       



             961)                                                

 

             MICROCYTES (BEAKER) (test code = 1+ few                            

     



             965)                                                

 

             ARTIFACT (CELLAVISION)(BEAKER) Present                             

   



             (test code = 3432)                                        

 

             PLATELET CONCENTRATION Decreased                              



             (CELLAVISION)(BEAKER) (test code =                                 

       



             3438)                                               



 ID - [id number] ID - Tianna Murillo comments: Slide comments:
BLOOD GAS, [id number] 04:56:00





             Test Item    Value        Reference Range Interpretation Comments

 

             PH ARTERIAL (BEAKER) (test code = 7.51         7.35-7.45    H      

      



             383)                                                

 

             PCO2 ARTERIAL (BEAKER) (test code 32 mm Hg     35-45        L      

      



             = 384)                                              

 

             PO2 ARTERIAL (BEAKER) (test code = 93 mm Hg     80-90        H     

       



             385)                                                

 

             O2 SATURATION ARTERIAL (BEAKER) 97.8 %       96.0-97.0    H        

    



             (test code = 386)                                        

 

             HCO3 ARTERIAL (BEAKER) (test code 25 mmol/L    21-29               

      



             = 388)                                              

 

             BASE EXCESS ARTERIAL (BEAKER) 2.7 mmol/L   -2.0-3.0                

  



             (test code = 387)                                        

 

             PATIENT TEMPERATURE (BEAKER) (test 36.9                            

       



             code = 1818)                                        

 

             FIO2 (BEAKER) (test code = 1819) 21.0                              

     



RAD, CHEST, 1 VIEW, NON [id number] 04:26:00Reason for exam:-&gt;loculated 
pleural effusion complicated by respiratory failure, s/p pleural pigtail 
catheter placement (left)Should this be performed at the bedside?-&gt;Yes
************************************************************[facility] : 1965 Sex: 
M************************************************************FINAL REPORT 
PATIENT ID: [id number] CLINICAL INDICATION: Left pleural effusion Comparison: 
2021 The cardiomediastinal contours are stable. The lung volumes remain 
low. Left-sided parenchymal and pleural opacities are similar to previous. There
is no pneumothorax. A left IJ CVC remains in place. Signed: Dhruv Werner 
MDReport Verified Date/Time: 2021 04:26:33 Electronically signed by: DHRUV WERNER M.D. on 2021 04:26 [id number] 04:22:00





             Test Item    Value        Reference Range Interpretation Comments

 

             PREALBUMIN (BEAKER) (test code = 586) 8 mg/dL      14-45        L  

          



 ID - RUTHANN [id number] 04:21:00





             Test Item    Value        Reference Range Interpretation Comments

 

             MAGNESIUM (BEAKER) (test code = 2.2 mg/dL    1.6-2.6               

    



             627)                                                



 ID - RUTHANN MBASIC METABOLIC [id number] 04:21:00





             Test Item    Value        Reference Range Interpretation Comments

 

             SODIUM (BEAKER) 135 meq/L    136-145      L            



             (test code = 381)                                        

 

             POTASSIUM (BEAKER) 4.1 meq/L    3.5-5.1                   



             (test code = 379)                                        

 

             CHLORIDE (BEAKER) 101 meq/L                        



             (test code = 382)                                        

 

             CO2 (BEAKER) (test 23 meq/L     22-29                     



             code = 355)                                         

 

             BLOOD UREA NITROGEN 10 mg/dL     7-21                      



             (BEAKER) (test code                                        



             = 354)                                              

 

             CREATININE (BEAKER) 0.61 mg/dL   0.57-1.25                 



             (test code = 358)                                        

 

             GLUCOSE RANDOM 114 mg/dL           H            



             (BEAKER) (test code                                        



             = 652)                                              

 

             CALCIUM (BEAKER) 8.1 mg/dL    8.4-10.2     L            



             (test code = 697)                                        

 

             EGFR (BEAKER) (test 138 mL/min/1.73                           ESTIM

ATED GFR IS



             code = 1092) sq m                                   NOT AS ACCURATE

 AS



                                                                 CREATININE



                                                                 CLEARANCE IN



                                                                 PREDICTING



                                                                 GLOMERULAR



                                                                 FILTRATION RATE

.



                                                                 ESTIMATED GFR I

S



                                                                 NOT APPLICABLE 

FOR



                                                                 DIALYSIS PATIEN

TS.



 ID - RUTHANN [id number] 04:21:00





             Test Item    Value        Reference Range Interpretation Comments

 

             PHOSPHORUS (BEAKER) (test code = 3.5 mg/dL    2.3-4.7              

     



             604)                                                



 ID - RUTHANN MPROTEIN, [id number] 04:21:00





             Test Item    Value        Reference Range Interpretation Comments

 

             TOTAL PROTEIN (BEAKER) (test code = 6.7 gm/dL    6.0-8.3           

        



             770)                                                



 ID - RUTHANN [id number] 04:21:00





             Test Item    Value        Reference Range Interpretation Comments

 

             ALBUMIN (BEAKER) (test code = 1145) 2.9 g/dL     3.5-5.0      L    

        



 ID - RUTHANN MRAD, CHEST, 1 VIEW, NON [id number] 15:46:00Reason for 
exam:-&gt;chest tube pullShould this be performed at the bedside?-&gt;Yes
************************************************************[facility] : 1965 Sex: 
M************************************************************FINAL REPORT 
PATIENT ID: [id number] Chest, one view HISTORY: Removal of chest tube Comparison: 
Earlier study performed on same date Findings: Lungs: Stable airspace disease 
within the left lung. Heart: Normal in size. Pleura: Small left pleural 
effusion, unchanged. No pneumothorax is appreciated. Bones: Unremarkable. 
Lines/tubes: Interval removal of previous left chest tube. Otherwise, no 
significant interval change. Signed: Chris Rodriguez MDReport Verified 
Date/Time: 2021 15:46:19 Reading Location: [facility] C013X Ortho Consult 
Reading Room Electronically signed by: CHRIS RODRIGUEZ MD on 2021 03:46 PM
BLOOD [id number] 13:01:00





             Test Item    Value        Reference Range Interpretation Comments

 

             CULTURE (BEAKER) (test No growth in 5 days                         

  



             code = 1095)                                        



BLOOD [id number] 13:01:00





             Test Item    Value        Reference Range Interpretation Comments

 

             CULTURE (BEAKER) (test No growth in 5 days                         

  



             code = 1095)                                        



BLOOD GAS, [id number] 05:21:00





             Test Item    Value        Reference Range Interpretation Comments

 

             PH ARTERIAL (BEAKER) (test code = 7.48         7.35-7.45    H      

      



             383)                                                

 

             PCO2 ARTERIAL (BEAKER) (test code 39 mm Hg     35-45               

      



             = 384)                                              

 

             PO2 ARTERIAL (BEAKER) (test code = 120 mm Hg    80-90        H     

       



             385)                                                

 

             O2 SATURATION ARTERIAL (BEAKER) 98.6 %       96.0-97.0    H        

    



             (test code = 386)                                        

 

             HCO3 ARTERIAL (BEAKER) (test code 29 mmol/L    21-29               

      



             = 388)                                              

 

             BASE EXCESS ARTERIAL (BEAKER) 4.8 mmol/L   -2.0-3.0     H          

  



             (test code = 387)                                        

 

             PATIENT TEMPERATURE (BEAKER) (test 37.2                            

       



             code = 1818)                                        

 

             FIO2 (BEAKER) (test code = 1819) 28.0                              

     



CBC W/PLT COUNT &amp; AUTO [id number] 04:34:00





             Test Item    Value        Reference Range Interpretation Comments

 

             WHITE BLOOD CELL COUNT (BEAKER) 4.2 K/ L     3.5-10.5              

    



             (test code = 775)                                        

 

             RED BLOOD CELL COUNT (BEAKER) 2.41 M/ L    4.63-6.08    L          

  



             (test code = 761)                                        

 

             HEMOGLOBIN (BEAKER) (test code = 7.6 GM/DL    13.7-17.5    L       

     



             410)                                                

 

             HEMATOCRIT (BEAKER) (test code = 23.0 %       40.1-51.0    L       

     



             411)                                                

 

             MEAN CORPUSCULAR VOLUME (BEAKER) 95.4 fL      79.0-92.2    H       

     



             (test code = 753)                                        

 

             MEAN CORPUSCULAR HEMOGLOBIN 31.5 pg      25.7-32.2                 



             (BEAKER) (test code = 751)                                        

 

             MEAN CORPUSCULAR HEMOGLOBIN CONC 33.0 GM/DL   32.3-36.5            

     



             (BEAKER) (test code = 752)                                        

 

             RED CELL DISTRIBUTION WIDTH 14.4 %       11.6-14.4                 



             (BEAKER) (test code = 412)                                        

 

             PLATELET COUNT (BEAKER) (test code 41 K/CU MM   150-450      L     

       



             = 756)                                              

 

             MEAN PLATELET VOLUME (BEAKER) 10.5 fL      9.4-12.4                

  



             (test code = 754)                                        

 

             NUCLEATED RED BLOOD CELLS (BEAKER) 0 /100 WBC   0-0                

       



             (test code = 413)                                        

 

             NEUTROPHILS RELATIVE PERCENT 41 %                                  

 



             (BEAKER) (test code = 429)                                        

 

             LYMPHOCYTES RELATIVE PERCENT 51 %                                  

 



             (BEAKER) (test code = 430)                                        

 

             MONOCYTES RELATIVE PERCENT 8 %                                    



             (BEAKER) (test code = 431)                                        

 

             EOSINOPHILS RELATIVE PERCENT 1 %                                   

 



             (BEAKER) (test code = 432)                                        

 

             BASOPHILS RELATIVE PERCENT 0 %                                    



             (BEAKER) (test code = 437)                                        

 

             NEUTROPHILS ABSOLUTE COUNT 1.72 K/ L    1.78-5.38    L            



             (BEAKER) (test code = 670)                                        

 

             LYMPHOCYTES ABSOLUTE COUNT 2.14 K/ L    1.32-3.57                 



             (BEAKER) (test code = 414)                                        

 

             MONOCYTES ABSOLUTE COUNT (BEAKER) 0.32 K/ L    0.30-0.82           

      



             (test code = 415)                                        

 

             EOSINOPHILS ABSOLUTE COUNT 0.02 K/ L    0.04-0.54    L            



             (BEAKER) (test code = 416)                                        

 

             BASOPHILS ABSOLUTE COUNT (BEAKER) 0.01 K/ L    0.01-0.08           

      



             (test code = 417)                                        

 

             IMMATURE GRANULOCYTES-RELATIVE 0 %          0-1                    

   



             PERCENT (BEAKER) (test code =                                      

  



             2801)                                               



BASIC METABOLIC [id number] 04:32:00





             Test Item    Value        Reference Range Interpretation Comments

 

             SODIUM (BEAKER) 136 meq/L    136-145                   



             (test code = 381)                                        

 

             POTASSIUM (BEAKER) 4.4 meq/L    3.5-5.1                   



             (test code = 379)                                        

 

             CHLORIDE (BEAKER) 101 meq/L                        



             (test code = 382)                                        

 

             CO2 (BEAKER) (test 26 meq/L     22-29                     



             code = 355)                                         

 

             BLOOD UREA NITROGEN 7 mg/dL      7-21                      



             (BEAKER) (test code                                        



             = 354)                                              

 

             CREATININE (BEAKER) 0.56 mg/dL   0.57-1.25    L            



             (test code = 358)                                        

 

             GLUCOSE RANDOM 101 mg/dL                        



             (BEAKER) (test code                                        



             = 652)                                              

 

             CALCIUM (BEAKER) 8.0 mg/dL    8.4-10.2     L            



             (test code = 697)                                        

 

             EGFR (BEAKER) (test 152 mL/min/1.73                           ESTIM

ATED GFR IS



             code = 1092) sq m                                   NOT AS ACCURATE

 AS



                                                                 CREATININE



                                                                 CLEARANCE IN



                                                                 PREDICTING



                                                                 GLOMERULAR



                                                                 FILTRATION RATE

.



                                                                 ESTIMATED GFR I

S



                                                                 NOT APPLICABLE 

FOR



                                                                 DIALYSIS PATIEN

TS.



 ID - RUTHANN [id number] 04:32:00





             Test Item    Value        Reference Range Interpretation Comments

 

             MAGNESIUM (BEAKER) (test code = 2.2 mg/dL    1.6-2.6               

    



             627)                                                



 ID - RUTAHNN [id number] 04:32:00





             Test Item    Value        Reference Range Interpretation Comments

 

             PHOSPHORUS (BEAKER) (test code = 3.7 mg/dL    2.3-4.7              

     



             604)                                                



 ID - RUTHANN MRAD, CHEST, 1 VIEW, NON [id number] 03:56:00Reason for 
exam:-&gt;loculated pleural effusion complicated by respiratory failure, s/p 
pleural pigtail catheter placement (left)Should this be performed at the 
bedside?-&gt;Yes************************************************************[facility] : 1965 Sex: 
M************************************************************FINAL REPORT 
PATIENT ID: [id number] RAD, CHEST, 1 VIEW, NON DEPT INDICATION: loculated pleural 
effusion complicated by respiratory failure, s/p pleural pigtail catheter 
placement (left) COMPARISON: Prior day's exam FINDINGS: Portable frontal view of
the chest. IMPRESSION: Support Lines: Stable. Lungs and pleura: Unchanged left-
sided lung opacities and small loculated pleural effusion. No pneumothorax.Heart
and mediastinum: Stable contours.Additional findings: None. Signed: Do Duncan
MDReport Verified Date/Time: 2021 03:56:30 Electronically signed by: DO DUNCAN MD on 2021 03:56 AMCBC W/PLT COUNT &amp; AUTO DIFFERENTIAL
2021 07:24:00





             Test Item    Value        Reference Range Interpretation Comments

 

             WHITE BLOOD CELL COUNT (BEAKER) 3.7 K/ L     3.5-10.5              

    



             (test code = 775)                                        

 

             RED BLOOD CELL COUNT (BEAKER) 2.39 M/ L    4.63-6.08    L          

  



             (test code = 761)                                        

 

             HEMOGLOBIN (BEAKER) (test code = 7.6 GM/DL    13.7-17.5    L       

     



             410)                                                

 

             HEMATOCRIT (BEAKER) (test code = 23.3 %       40.1-51.0    L       

     



             411)                                                

 

             MEAN CORPUSCULAR VOLUME (BEAKER) 97.5 fL      79.0-92.2    H       

     



             (test code = 753)                                        

 

             MEAN CORPUSCULAR HEMOGLOBIN 31.8 pg      25.7-32.2                 



             (BEAKER) (test code = 751)                                        

 

             MEAN CORPUSCULAR HEMOGLOBIN CONC 32.6 GM/DL   32.3-36.5            

     



             (BEAKER) (test code = 752)                                        

 

             RED CELL DISTRIBUTION WIDTH 14.6 %       11.6-14.4    H            



             (BEAKER) (test code = 412)                                        

 

             PLATELET COUNT (BEAKER) (test code 31 K/CU MM   150-450      L     

       



             = 756)                                              

 

             MEAN PLATELET VOLUME (BEAKER) 10.8 fL      9.4-12.4                

  



             (test code = 754)                                        

 

             NUCLEATED RED BLOOD CELLS (BEAKER) 0 /100 WBC   0-0                

       



             (test code = 413)                                        



(CELLAVISION MANUAL DIFF)2021 07:24:00





             Test Item    Value        Reference Range Interpretation Comments

 

             NEUTROPHILS - REL 50 %                                   



             (CELLAVISION)(BEAKER) (test code =                                 

       



             2816)                                               

 

             LYMPHOCYTES - REL 40 %                                   



             (CELLAVISION)(BEAKER) (test code =                                 

       



             2817)                                               

 

             MONOCYTES - REL 5 %                                    



             (CELLAVISION)(BEAKER) (test code =                                 

       



             2818)                                               

 

             BANDS - REL (CELLAVISION)(BEAKER) 5 %          0-10                

      



             (test code = 2826)                                        

 

             NEUTROPHILS - ABS 1.85 K/ul    1.78-5.38                 



             (CELLAVISION)(BEAKER) (test code =                                 

       



             2830)                                               

 

             LYMPHOCYTES - ABS 1.48 K/ul    1.32-3.57                 



             (CELLAVISION)(BEAKER) (test code =                                 

       



             2831)                                               

 

             MONOCYTES - ABS 0.19 K/uL    0.30-0.82    L            



             (CELLAVISION)(BEAKER) (test code =                                 

       



             2832)                                               

 

             BANDS - ABS (CELLAVISION)(BEAKER) 0.19 K/uL    0.00-0.80           

      



             (test code = 2840)                                        

 

             TOTAL COUNTED (BEAKER) (test code = 100                            

        



             1351)                                               

 

             WBC MORPHOLOGY (BEAKER) (test code Normal                          

       



             = 487)                                              

 

             PLT MORPHOLOGY (BEAKER) (test code Normal                          

       



             = 486)                                              

 

             HYPOCHROMIA (BEAKER) (test code = 1+ few                           

      



             963)                                                

 

             ANISOCYTOSIS (BEAKER) (test code = 1+ few                          

       



             961)                                                

 

             MACROCYTES (BEAKER) (test code = 1+ few                            

     



             964)                                                

 

             POIKILOCYTES (BEAKER) (test code = 1+ few                          

       



             966)                                                

 

             OVALOCYTES (BEAKER) (test code = 1+ few                            

     



             477)                                                

 

             ARTIFACT (CELLAVISION)(BEAKER) Present                             

   



             (test code = 3432)                                        

 

             PLATELET CONCENTRATION Decreased                              



             (CELLAVISION)(BEAKER) (test code =                                 

       



             3438)                                               



 ID - [id number] ID - Jesus-Justyn Meza comments: Slide comments:BASIC
METABOLIC [id number] 03:46:00





             Test Item    Value        Reference Range Interpretation Comments

 

             SODIUM (BEAKER) 135 meq/L    136-145      L            



             (test code = 381)                                        

 

             POTASSIUM (BEAKER) 4.1 meq/L    3.5-5.1                   



             (test code = 379)                                        

 

             CHLORIDE (BEAKER) 102 meq/L                        



             (test code = 382)                                        

 

             CO2 (BEAKER) (test 26 meq/L     22-29                     



             code = 355)                                         

 

             BLOOD UREA NITROGEN 10 mg/dL     7-21                      



             (BEAKER) (test code                                        



             = 354)                                              

 

             CREATININE (BEAKER) 0.57 mg/dL   0.57-1.25                 



             (test code = 358)                                        

 

             GLUCOSE RANDOM 127 mg/dL           H            



             (BEAKER) (test code                                        



             = 652)                                              

 

             CALCIUM (BEAKER) 7.9 mg/dL    8.4-10.2     L            



             (test code = 697)                                        

 

             EGFR (BEAKER) (test 149 mL/min/1.73                           ESTIM

ATED GFR IS



             code = 1092) sq m                                   NOT AS ACCURATE

 AS



                                                                 CREATININE



                                                                 CLEARANCE IN



                                                                 PREDICTING



                                                                 GLOMERULAR



                                                                 FILTRATION RATE

.



                                                                 ESTIMATED GFR I

S



                                                                 NOT APPLICABLE 

FOR



                                                                 DIALYSIS PATIEN

TS.



 ID - RUTHANN [id number] 03:35:00





             Test Item    Value        Reference Range Interpretation Comments

 

             MAGNESIUM (BEAKER) (test code = 2.2 mg/dL    1.6-2.6               

    



             627)                                                



 ID - RUTHANN [id number] 03:35:00





             Test Item    Value        Reference Range Interpretation Comments

 

             PHOSPHORUS (BEAKER) (test code = 3.7 mg/dL    2.3-4.7              

     



             604)                                                



 ID - RUTHANN MBLOOD GAS, [id number] 03:16:00





             Test Item    Value        Reference Range Interpretation Comments

 

             PH ARTERIAL (BEAKER) (test code = 7.48         7.35-7.45    H      

      



             383)                                                

 

             PCO2 ARTERIAL (BEAKER) (test code 37 mm Hg     35-45               

      



             = 384)                                              

 

             PO2 ARTERIAL (BEAKER) (test code = 155 mm Hg    80-90        H     

       



             385)                                                

 

             O2 SATURATION ARTERIAL (BEAKER) 99.1 %       96.0-97.0    H        

    



             (test code = 386)                                        

 

             HCO3 ARTERIAL (BEAKER) (test code 27 mmol/L    21-29               

      



             = 388)                                              

 

             BASE EXCESS ARTERIAL (BEAKER) 3.5 mmol/L   -2.0-3.0     H          

  



             (test code = 387)                                        

 

             PATIENT TEMPERATURE (BEAKER) (test 37.1                            

       



             code = 1818)                                        

 

             FIO2 (BEAKER) (test code = 1819) 36.0                              

     



RAD, CHEST, 1 VIEW, NON [id number] 03:15:00Reason for exam:-&gt;loculated 
pleural effusion complicated by respiratory failure, s/p pleural pigtail 
catheter placement (left)Should this be performed at the bedside?-&gt;Yes
************************************************************[facility] : 1965 Sex: 
M************************************************************FINAL REPORT 
PATIENT ID: [id number] CLINICAL INDICATION: Loculated left pleural effusion 
coupled Respiratory failure Comparison: 2021 The cardiomediastinal contours
are stable. The lung volumes remain low. Central pulmonary vascular prominence 
and left greater than right parenchymal and left pleural opacities are similar 
to previous. There is no pneumothorax. A left-sided chest tube has been removed.
Remaining support lines are stable. Signed: Dhruv Werner MDReport Verified 
Date/Time: 2021 03:15:16 Electronically signed by: DHRUV WERNER M.D. on
2021 03:15 AMPOCT-GLUCOSE [id number] 17:37:00





             Test Item    Value        Reference Range Interpretation Comments

 

             POC-GLUCOSE METER 117 mg/dL           H            : TESTED A

T [facility] [address]



             (BEAKER) (test code =                                        [facility],



             1538)                                               [id number]:



                                                                 /Techni

ayla ID



                                                                 = [id number] for Vi

ce



                                                                 (contract), Natalie

venessa



POCT-GLUCOSE [id number] 11:47:00





             Test Item    Value        Reference Range Interpretation Comments

 

             POC-GLUCOSE METER 104 mg/dL                        : TESTED A

T [facility] [address]



             (BEAKER) (test code =                                        [facility]

[facility],



             1538)                                               [address]:



                                                                 /Techni

ayla ID



                                                                 = [id number] for Vi

ce



                                                                 (contract), Natalie

le



ANAEROBIC [id number] 07:49:00





             Test Item    Value        Reference Range Interpretation Comments

 

             CULTURE (BEAKER) (test No anaerobes isolated                       

    



             code = 1095)                                        



ANAEROBIC [id number] 07:48:00





             Test Item    Value        Reference Range Interpretation Comments

 

             CULTURE (BEAKER) (test No anaerobes isolated                       

    



             code = 1095)                                        



ANAEROBIC [id number] 07:44:00





             Test Item    Value        Reference Range Interpretation Comments

 

             CULTURE (BEAKER) (test No anaerobes isolated                       

    



             code = 1095)                                        



ANAEROBIC [id number] 07:43:00





             Test Item    Value        Reference Range Interpretation Comments

 

             CULTURE (BEAKER) (test No anaerobes isolated                       

    



             code = 1095)                                        



CBC W/PLT COUNT &amp; AUTO [id number] 07:11:00





             Test Item    Value        Reference Range Interpretation Comments

 

             WHITE BLOOD CELL COUNT (BEAKER) 3.8 K/ L     3.5-10.5              

    



             (test code = 775)                                        

 

             RED BLOOD CELL COUNT (BEAKER) 2.44 M/ L    4.63-6.08    L          

  



             (test code = 761)                                        

 

             HEMOGLOBIN (BEAKER) (test code = 7.9 GM/DL    13.7-17.5    L       

     



             410)                                                

 

             HEMATOCRIT (BEAKER) (test code = 23.4 %       40.1-51.0    L       

     



             411)                                                

 

             MEAN CORPUSCULAR VOLUME (BEAKER) 95.9 fL      79.0-92.2    H       

     



             (test code = 753)                                        

 

             MEAN CORPUSCULAR HEMOGLOBIN 32.4 pg      25.7-32.2    H            



             (BEAKER) (test code = 751)                                        

 

             MEAN CORPUSCULAR HEMOGLOBIN CONC 33.8 GM/DL   32.3-36.5            

     



             (BEAKER) (test code = 752)                                        

 

             RED CELL DISTRIBUTION WIDTH 14.9 %       11.6-14.4    H            



             (BEAKER) (test code = 412)                                        

 

             PLATELET COUNT (BEAKER) (test code 22 K/CU MM   150-450      L     

       



             = 756)                                              

 

             MEAN PLATELET VOLUME (BEAKER) 10.5 fL      9.4-12.4                

  



             (test code = 754)                                        

 

             NUCLEATED RED BLOOD CELLS (BEAKER) 0 /100 WBC   0-0                

       



             (test code = 413)                                        



(CELLAVISION MANUAL DIFF)2021 07:11:00





             Test Item    Value        Reference Range Interpretation Comments

 

             NEUTROPHILS - REL 61 %                                   



             (CELLAVISION)(BEAKER) (test code =                                 

       



             2816)                                               

 

             LYMPHOCYTES - REL 32 %                                   



             (CELLAVISION)(BEAKER) (test code =                                 

       



             2817)                                               

 

             MONOCYTES - REL 3 %                                    



             (CELLAVISION)(BEAKER) (test code =                                 

       



             2818)                                               

 

             EOSINOPHILS - REL 2 %                                    



             (CELLAVISION)(BEAKER) (test code =                                 

       



             2819)                                               

 

             BANDS - REL (CELLAVISION)(BEAKER) 1 %          0-10                

      



             (test code = 2826)                                        

 

             ATYPICAL LYMPHOCYTES - REL 1 %          0-0          H            



             (CELLAVISION)(BEAKER) (test code =                                 

       



             2829)                                               

 

             NEUTROPHILS - ABS 2.32 K/ul    1.78-5.38                 



             (CELLAVISION)(BEAKER) (test code =                                 

       



             2830)                                               

 

             LYMPHOCYTES - ABS 1.22 K/ul    1.32-3.57    L            



             (CELLAVISION)(BEAKER) (test code =                                 

       



             2831)                                               

 

             MONOCYTES - ABS 0.11 K/uL    0.30-0.82    L            



             (CELLAVISION)(BEAKER) (test code =                                 

       



             2832)                                               

 

             EOSINOPHILS - ABS 0.08 K/uL    0.04-0.54                 



             (CELLAVISION)(BEAKER) (test code =                                 

       



             2834)                                               

 

             BANDS - ABS (CELLAVISION)(BEAKER) 0.04 K/uL    0.00-0.80           

      



             (test code = 2840)                                        

 

             ATYPICAL LYMPHOCYTES - ABS 0.04 K/uL    0.00-0.00    H            



             (CELLAVISION)(BEAKER) (test code =                                 

       



             2858)                                               

 

             TOTAL COUNTED (BEAKER) (test code = 100                            

        



             1351)                                               

 

             RBC MORPHOLOGY (BEAKER) (test code Normal                          

       



             = 762)                                              

 

             PLT MORPHOLOGY (BEAKER) (test code Normal                          

       



             = 486)                                              

 

             SMUDGE CELLS (BEAKER) (test code = Present                         

       



             1371)                                               

 

             PLATELET CONCENTRATION Decreased                              



             (CELLAVISION)(BEAKER) (test code =                                 

       



             3438)                                               



 ID - [id number] ID - Jamilah BettencourtWing comments: Slide comments:BASIC 
METABOLIC [id number] 03:42:00





             Test Item    Value        Reference Range Interpretation Comments

 

             SODIUM (BEAKER) 136 meq/L    136-145                   



             (test code = 381)                                        

 

             POTASSIUM (BEAKER) 4.1 meq/L    3.5-5.1                   



             (test code = 379)                                        

 

             CHLORIDE (BEAKER) 103 meq/L                        



             (test code = 382)                                        

 

             CO2 (BEAKER) (test 27 meq/L     22-29                     



             code = 355)                                         

 

             BLOOD UREA NITROGEN 9 mg/dL      7-21                      



             (BEAKER) (test code                                        



             = 354)                                              

 

             CREATININE (BEAKER) 0.49 mg/dL   0.57-1.25    L            



             (test code = 358)                                        

 

             GLUCOSE RANDOM 110 mg/dL           H            



             (BEAKER) (test code                                        



             = 652)                                              

 

             CALCIUM (BEAKER) 7.9 mg/dL    8.4-10.2     L            



             (test code = 697)                                        

 

             EGFR (BEAKER) (test 177 mL/min/1.73                           ESTIM

ATED GFR IS



             code = 1092) sq m                                   NOT AS ACCURATE

 AS



                                                                 CREATININE



                                                                 CLEARANCE IN



                                                                 PREDICTING



                                                                 GLOMERULAR



                                                                 FILTRATION RATE

.



                                                                 ESTIMATED GFR I

S



                                                                 NOT APPLICABLE 

FOR



                                                                 DIALYSIS PATIEN

TS.



 ID - [id number] 03:41:00





             Test Item    Value        Reference Range Interpretation Comments

 

             MAGNESIUM (BEAKER) (test code = 2.2 mg/dL    1.6-2.6               

    



             627)                                                



 ID - [id number] 03:41:00





             Test Item    Value        Reference Range Interpretation Comments

 

             PHOSPHORUS (BEAKER) (test code = 3.9 mg/dL    2.3-4.7              

     



             604)                                                



 ID - DBBLOOD GAS, [id number] 03:33:00





             Test Item    Value        Reference Range Interpretation Comments

 

             PH ARTERIAL (BEAKER) (test code = 7.47         7.35-7.45    H      

      



             383)                                                

 

             PCO2 ARTERIAL (BEAKER) (test code 40 mm Hg     35-45               

      



             = 384)                                              

 

             PO2 ARTERIAL (BEAKER) (test code = 182 mm Hg    80-90        H     

       



             385)                                                

 

             O2 SATURATION ARTERIAL (BEAKER) 99.3 %       96.0-97.0    H        

    



             (test code = 386)                                        

 

             HCO3 ARTERIAL (BEAKER) (test code 29 mmol/L    21-29               

      



             = 388)                                              

 

             BASE EXCESS ARTERIAL (BEAKER) 4.9 mmol/L   -2.0-3.0     H          

  



             (test code = 387)                                        

 

             PATIENT TEMPERATURE (BEAKER) (test 37.5                            

       



             code = 1818)                                        

 

             FIO2 (BEAKER) (test code = 1819) 40.0                              

     



RAD, CHEST, 1 VIEW, NON [id number] 03:31:00Reason for exam:-&gt;loculated 
pleural effusion complicated by respiratory failure, s/p pleural pigtail 
catheter placement (left)Should this be performed at the bedside?-&gt;Yes
********************************************************[facility] : 1965 Sex: 
M************************************************************FINAL REPORT 
PATIENT ID: [id number] RAD, CHEST, 1 VIEW, NON DEPT INDICATION: loculated pleural 
effusion complicated by respiratory failure, s/p pleural pigtail catheter 
placement (left) COMPARISON: Prior day's exam FINDINGS: Portable frontal view of
the chest. IMPRESSION: Support Lines: Stable. Lungs and pleura: Unchanged left-
sided parenchymal and pleural opacities. No pneumothorax.Heart and mediastinum: 
Stablecontours.Additional findings: None. Signed: Do Duncanepsatinder Verified
Date/Time: 2021 03:31:39 Electronically signed by: DO DUNCAN MD on 
2021 03:31 AMBLOOD [id number] 19:01:00





             Test Item    Value        Reference Range Interpretation Comments

 

             CULTURE (BEAKER) (test No growth in 5 days                         

  



             code = 1095)                                        



BLOOD [id number] 19:01:00





             Test Item    Value        Reference Range Interpretation Comments

 

             CULTURE (BEAKER) (test No growth in 5 days                         

  



             code = 1095)                                        



SURGICALLY OBTAINED CULTURE + GRAM [id number] 11:00:00





             Test Item    Value        Reference    Interpretation Comments



                                       Range                     

 

             CULTURE (BEAKER) STAPHYLOCOCCUS              A            <1+ Staph

ylococcus



             (test code = 1095) AUREUS                                 aureus

 

             Clindamycin (test                           S            



             code = 10)                                          

 

             Erythromycin (test                           S            



             code = 4)                                           

 

             Linezolid (test code                           S            



             = 40)                                               

 

             Nitrofurantoin (test                           S            



             code = 23)                                          

 

             Oxacillin (test code                           S            



             = 14)                                               

 

             Rifampin (test code =                           S            



             43)                                                 

 

             Tetracycline (test                           S            



             code = 2)                                           

 

             Trimethoprim +                           S            



             Sulfamethoxazole                                        



             (test code = 47)                                        

 

             Vancomycin (test code                           S            



             = 13)                                               

 

             CULTURE (BEAKER) STAPHYLOCOCCUS              A            <1+ Staph

ylococcus



             (test code = 1095) LUGDUNENSIS                            lugdunens

is

 

             Clindamycin (test                           S            



             code = 10)                                          

 

             Erythromycin (test                           S            



             code = 4)                                           

 

             Levofloxacin (test                           S            



             code = 22)                                          

 

             Linezolid (test code                           S            



             = 40)                                               

 

             Nitrofurantoin (test                           S            



             code = 23)                                          

 

             Oxacillin (test code                           R            



             = 14)                                               

 

             Rifampin (test code =                           S            



             43)                                                 

 

             Tetracycline (test                           S            



             code = 2)                                           

 

             Trimethoprim +                           S            



             Sulfamethoxazole                                        



             (test code = 47)                                        

 

             Vancomycin (test code                           S            



             = 13)                                               

 

             GRAM STAIN RESULT <1+ WBCs                               



             (BEAKER) (test code =                                        



             1123)                                               

 

             GRAM STAIN RESULT No organisms seen                           



             (BEAKER) (test code =                                        



             453732)                                             



SURGICALLY OBTAINED CULTURE + GRAM [id number] 11:00:00





             Test Item    Value        Reference Range Interpretation Comments

 

             CULTURE                                A            From Broth Only

 Same



             (BEAKER) (test                                        organism has 

been



             code = 1095)                                        isolated from



                                                                 cultures(s) of 

the same



                                                                 body site and



                                                                 collection date

. Repeat



                                                                 identification 

and



                                                                 susceptibility 

testing



                                                                 performed only 

after



                                                                 consultation wi

th the



                                                                 clinical microb

iology



                                                                 laboratory.Refe

r to



                                                                 previous cultur

e



                                                                 ofStaphylococcu

s aureus

 

             GRAM STAIN   <1+ WBCs                               



             RESULT (BEAKER)                                        



             (test code =                                        



             1123)                                               

 

             GRAM STAIN   No organisms                           



             RESULT (BEAKER) seen                                   



             (test code =                                        



             048224)                                             



ANAEROBIC [id number] 09:42:00





             Test Item    Value        Reference Range Interpretation Comments

 

             CULTURE (BEAKER) (test No anaerobes isolated                       

    



             code = 1095)                                        



ANAEROBIC [id number] 09:41:00





             Test Item    Value        Reference Range Interpretation Comments

 

             CULTURE (BEAKER) (test No anaerobes isolated                       

    



             code = 1095)                                        



ANAEROBIC [id number] 09:41:00





             Test Item    Value        Reference Range Interpretation Comments

 

             CULTURE (BEAKER) (test No anaerobes isolated                       

    



             code = 1095)                                        



DIGOXIN [id number] 09:40:00





             Test Item    Value        Reference Range Interpretation Comments

 

             DIGOXIN LEVEL (BEAKER) (test code = < ng/mL      0.80-2.00    L    

        



             669)                                                



 ID - RUTHANN MANAEROBIC [id number] 09:40:00





             Test Item    Value        Reference Range Interpretation Comments

 

             CULTURE (BEAKER) (test No anaerobes isolated                       

    



             code = 1095)                                        



CBC W/PLT COUNT &amp; AUTO [id number] 06:39:00





             Test Item    Value        Reference Range Interpretation Comments

 

             WHITE BLOOD CELL COUNT 4.1 K/ L     3.5-10.5                  



             (BEAKER) (test code =                                        



             775)                                                

 

             RED BLOOD CELL COUNT 2.55 M/ L    4.63-6.08    L            



             (BEAKER) (test code =                                        



             761)                                                

 

             HEMOGLOBIN (BEAKER) 8.1 GM/DL    13.7-17.5    L            



             (test code = 410)                                        

 

             HEMATOCRIT (BEAKER) 24.1 %       40.1-51.0    L            



             (test code = 411)                                        

 

             MEAN CORPUSCULAR 94.5 fL      79.0-92.2    H            Discordant 

MCV result



             VOLUME (BEAKER) (test                                        compar

ed to previous



             code = 753)                                         result; clinica

l



                                                                 correlation req

uired.

 

             MEAN CORPUSCULAR 31.8 pg      25.7-32.2                 



             HEMOGLOBIN (BEAKER)                                        



             (test code = 751)                                        

 

             MEAN CORPUSCULAR 33.6 GM/DL   32.3-36.5                 



             HEMOGLOBIN CONC                                        



             (BEAKER) (test code =                                        



             752)                                                

 

             RED CELL DISTRIBUTION 15.3 %       11.6-14.4    H            



             WIDTH (BEAKER) (test                                        



             code = 412)                                         

 

             PLATELET COUNT 37 K/CU MM   150-450      L            



             (BEAKER) (test code =                                        



             756)                                                

 

             MEAN PLATELET VOLUME 10.4 fL      9.4-12.4                  



             (BEAKER) (test code =                                        



             754)                                                

 

             NUCLEATED RED BLOOD 0 /100 WBC   0-0                       



             CELLS (BEAKER) (test                                        



             code = 413)                                         



(CELLAVISION MANUAL DIFF)2021 06:39:00





             Test Item    Value        Reference Range Interpretation Comments

 

             NEUTROPHILS - REL 56 %                                   



             (CELLAVISION)(BEAKER) (test code =                                 

       



             2816)                                               

 

             LYMPHOCYTES - REL 40 %                                   



             (CELLAVISION)(BEAKER) (test code =                                 

       



             2817)                                               

 

             MONOCYTES - REL 3 %                                    



             (CELLAVISION)(BEAKER) (test code =                                 

       



             2818)                                               

 

             ATYPICAL LYMPHOCYTES - REL 1 %          0-0          H            



             (CELLAVISION)(BEAKER) (test code =                                 

       



             2829)                                               

 

             NEUTROPHILS - ABS 2.30 K/ul    1.78-5.38                 



             (CELLAVISION)(BEAKER) (test code =                                 

       



             2830)                                               

 

             LYMPHOCYTES - ABS 1.64 K/ul    1.32-3.57                 



             (CELLAVISION)(BEAKER) (test code =                                 

       



             2831)                                               

 

             MONOCYTES - ABS 0.12 K/uL    0.30-0.82    L            



             (CELLAVISION)(BEAKER) (test code =                                 

       



             2832)                                               

 

             ATYPICAL LYMPHOCYTES - ABS 0.04 K/uL    0.00-0.00    H            



             (CELLAVISION)(BEAKER) (test code =                                 

       



             2858)                                               

 

             TOTAL COUNTED (BEAKER) (test code = 100                            

        



             1351)                                               

 

             WBC MORPHOLOGY (BEAKER) (test code Normal                          

       



             = 487)                                              

 

             PLT MORPHOLOGY (BEAKER) (test code Normal                          

       



             = 486)                                              

 

             ANISOCYTOSIS (BEAKER) (test code = 1+ few                          

       



             961)                                                

 

             ARTIFACT (CELLAVISION)(BEAKER) Present                             

   



             (test code = 3432)                                        

 

             PLATELET CONCENTRATION Decreased                              



             (CELLAVISION)(BEAKER) (test code =                                 

       



             3438)                                               



 ID - [id number] ID - Margarita OverholtUser comments: Slide comments:RAD, 
CHEST, 1 VIEW, NON [id number] 05:45:00Reason for exam:-&gt;loculated pleural
effusion complicated by respiratory failure, s/p pleural pigtail catheter 
placement (left)Should this be performed at the bedside?-&gt;Yes
************************************************************[facility] : 1965  Sex: 
M************************************************************FINAL REPORT 
PATIENT ID: [id number] RAD, CHEST, 1 VIEW, NON DEPT INDICATION: loculated pleural 
effusion complicated by respiratory failure, s/p pleural pigtail catheter 
placement (left) COMPARISON: Prior day's exam FINDINGS: Portable frontal view of
the chest. IMPRESSION: Support Lines: Interval extubation and removal of the 
feeding tube. Otherwise, stable. Lungs and pleura: No significant change in left
greater than right patchy lung opacities and left pleural effusion. No 
pneumothorax.Heart and mediastinum: Stable contours.Additional findings: None. 
Signed: Do Duncanort Verified Date/Time: 2021 05:45:21 
Electronically signed by: DO DUNCAN MD on 2021 05:45 AMBASIC 
METABOLIC [id number] 04:05:00





             Test Item    Value        Reference Range Interpretation Comments

 

             SODIUM (BEAKER) 140 meq/L    136-145                   



             (test code = 381)                                        

 

             POTASSIUM (BEAKER) 3.6 meq/L    3.5-5.1                   



             (test code = 379)                                        

 

             CHLORIDE (BEAKER) 104 meq/L                        



             (test code = 382)                                        

 

             CO2 (BEAKER) (test 28 meq/L     22-29                     



             code = 355)                                         

 

             BLOOD UREA NITROGEN 11 mg/dL     7-21                      



             (BEAKER) (test code                                        



             = 354)                                              

 

             CREATININE (BEAKER) 0.49 mg/dL   0.57-1.25    L            



             (test code = 358)                                        

 

             GLUCOSE RANDOM 113 mg/dL           H            



             (BEAKER) (test code                                        



             = 652)                                              

 

             CALCIUM (BEAKER) 7.7 mg/dL    8.4-10.2     L            



             (test code = 697)                                        

 

             EGFR (BEAKER) (test 177 mL/min/1.73                           ESTIM

ATED GFR IS



             code = 1092) sq m                                   NOT AS ACCURATE

 AS



                                                                 CREATININE



                                                                 CLEARANCE IN



                                                                 PREDICTING



                                                                 GLOMERULAR



                                                                 FILTRATION RATE

.



                                                                 ESTIMATED GFR I

S



                                                                 NOT APPLICABLE 

FOR



                                                                 DIALYSIS PATIEN

TS.



 ID - RUTHANN [id number] 03:51:00





             Test Item    Value        Reference Range Interpretation Comments

 

             MAGNESIUM (BEAKER) (test code = 2.2 mg/dL    1.6-2.6               

    



             627)                                                



 ID - RUTHANN [id number] 03:51:00





             Test Item    Value        Reference Range Interpretation Comments

 

             PHOSPHORUS (BEAKER) (test code = 3.2 mg/dL    2.3-4.7              

     



             604)                                                



 ID - RUTHANN MBLOOD GAS, [id number] 03:33:00





             Test Item    Value        Reference Range Interpretation Comments

 

             PH ARTERIAL (BEAKER) (test code = 7.52         7.35-7.45    H      

      



             383)                                                

 

             PCO2 ARTERIAL (BEAKER) (test code 38 mm Hg     35-45               

      



             = 384)                                              

 

             PO2 ARTERIAL (BEAKER) (test code = 147 mm Hg    80-90        H     

       



             385)                                                

 

             O2 SATURATION ARTERIAL (BEAKER) 99.1 %       96.0-97.0    H        

    



             (test code = 386)                                        

 

             HCO3 ARTERIAL (BEAKER) (test code 30 mmol/L    21-29        H      

      



             = 388)                                              

 

             BASE EXCESS ARTERIAL (BEAKER) 7.2 mmol/L   -2.0-3.0     H          

  



             (test code = 387)                                        

 

             PATIENT TEMPERATURE (BEAKER) (test 37.5                            

       



             code = 1818)                                        

 

             FIO2 (BEAKER) (test code = 1819) 36.0                              

     



TISSUE [id number] 16:03:00Surgical Pathology Report Case: [id number] 
Authorizing Provider: Sean Hedrick MD  Collected:2021 09:33 AM 
Ordering Location: [address] Received: 2021 11:04 AM Pathologist: 
Jana Dejesus MD  Specimens: A) - Pleural, Left, LEFT PARIETAL PLEURA B) - 
Lung, Left Upper Lobe, LEFT UPPER LOBE PEEL C) - Lung, Left Lower Lobe D) - 
Diaphragm, Diaghramatic Peel  A. PLEURA, LEFT PARIETAL, DECORTICATION: - 
FIBRINOUS EXUDATE WITH ACUTE INFLAMMATION CONSISTENT WITH EMPYEMA (SEE COMMENT)B
. PLEURA, VISCERAL, LEFT LOWER LOBE OF LUNG, DECORTICATION: - FIBRINOUS EXUDATE 
WITH ACUTE INFLAMMATION CONSISTENT WITH EMPYEMA C. PLEURA, VISCERAL, LEFT LOWER 
LOBE OF LUNG, DECORTICATION: - FIBRINOUS EXUDATE WITH ACUTE INFLAMMATION 
CONSISTENT WITH EMPYEMA D. PLEURA, VISCERAL, DIAPHRAGMATIC PEEL: - FIBRINOUS 
EXUDATE WITH ACUTE INFLAMMATION CONSISTENT WITH EMPYEMA Signing Pathologist 
Direct Phone Line: [phone] signed by Jana Dejesus MD on 
2021 at 4:03 PMAll parts of this case show fibrinous exudate with marked 
acute inflammation. Please correlate with microbiology culture studies.[id number] x 
2, 79696 x 2Pleural effusion on left A. Pleura, leftB. Lung, left upper lobeC. 
Lung, left lower lobeD. DiaphragmA. Received fresh labeled the patient's name, 
accession number and "left parietal pleura" is a 2.1 x 0.7 x 0.2 cm tan-red, 
fibromembranous soft tissue which is entirely submitted in A1.B. Received fresh 
labeled the patient's name, accession number and "left upper lobe lung peel" is 
a 3.6 x 2.7 x 0.3 cm aggregate of tan-red, fibromembranous tissue and 
fibrinopurulent exudate.The specimen is entirely submitted in B1-B3.C. Received 
fresh labeled the patient's name, accession number and "lung, left lower lobe" 
is a 4.0 x 3.0 x 0.3 cm aggregate of tan-red, fibromembranous tissue and 
fibrinopurulent exudate, which is entirely submitted in C1-C3.D. Received fresh 
labeled the patient's name, accession number and "diaphragmatic peel" is a 2.5 x
1.9 x 0.2 cm aggregate of tan-red fibromembranous tissue, which is entirely 
submitted in D1-D2.CARI Joseph, HT (ASCP)Performed.[facility], Department of Pathology, [address] [place] [address],
Tel [phone] Angeles Community Hospital of [facility], Department of Pathology, [address] [place] [address], Tel [phone] Angeles Community Hospital of Norwalk, Department of Pathology, [address] [place] [address], Tel 
[phone] OBTAINED CULTURE + GRAM [id number] 14:17:00





             Test Item    Value        Reference Range Interpretation Comments

 

             CULTURE (BEAKER) (test code No growth                              



             = 1095)                                             

 

             GRAM STAIN RESULT (BEAKER) 3+ WBCs                                



             (test code = 1123)                                        

 

             GRAM STAIN RESULT (BEAKER) No organisms seen                       

    



             (test code = 85583)                                        



SURGICALLY OBTAINED CULTURE + GRAM [id number] 14:17:00





             Test Item    Value        Reference Range Interpretation Comments

 

             CULTURE (BEAKER) (test code No growth                              



             = 1095)                                             

 

             GRAM STAIN RESULT (BEAKER) 1+ WBCs                                



             (test code = 1123)                                        

 

             GRAM STAIN RESULT (BEAKER) No organisms seen                       

    



             (test code = 10917)                                        



SURGICALLY OBTAINED CULTURE + GRAM [id number] 14:17:00





             Test Item    Value        Reference Range Interpretation Comments

 

             CULTURE (BEAKER) (test code No growth                              



             = 1095)                                             

 

             GRAM STAIN RESULT (BEAKER) 1+ WBCs                                



             (test code = 1123)                                        

 

             GRAM STAIN RESULT (BEAKER) No organisms seen                       

    



             (test code = 46784)                                        



HEMOGLOBIN AND [id number] 13:03:00





             Test Item    Value        Reference Range Interpretation Comments

 

             HEMOGLOBIN (BEAKER) (test code = 8.5 GM/DL    13.7-17.5    L       

     



             410)                                                

 

             HEMATOCRIT (BEAKER) (test code = 24.2 %       40.1-51.0    L       

     



             411)                                                



 ID - [id number] ID - [id number] ID - [id number] ID - [id number]
SURGICALLY OBTAINED CULTURE + GRAM [id number] 11:58:00





             Test Item    Value        Reference Range Interpretation Comments

 

             CULTURE (BEAKER) (test code No growth                              



             = 1095)                                             

 

             GRAM STAIN RESULT (BEAKER) 1+ WBCs                                



             (test code = 1123)                                        

 

             GRAM STAIN RESULT (BEAKER) No organisms seen                       

    



             (test code = 06194)                                        



SURGICALLY OBTAINED CULTURE + GRAM [id number] 11:58:00





             Test Item    Value        Reference Range Interpretation Comments

 

             CULTURE (BEAKER) (test code No growth                              



             = 1095)                                             

 

             GRAM STAIN RESULT (BEAKER) 2+ WBCs                                



             (test code = 1123)                                        

 

             GRAM STAIN RESULT (BEAKER) No organisms seen                       

    



             (test code = 89981)                                        



SURGICALLY OBTAINED CULTURE + GRAM [id number] 11:48:00





             Test Item    Value        Reference Range Interpretation Comments

 

             CULTURE (BEAKER) (test code No growth                              



             = 1095)                                             

 

             GRAM STAIN RESULT (BEAKER) 4+ WBCs                                



             (test code = 1123)                                        

 

             GRAM STAIN RESULT (BEAKER) No organisms seen                       

    



             (test code = 42807)                                        



BLOOD GAS, [id number] 10:37:00





             Test Item    Value        Reference Range Interpretation Comments

 

             PH ARTERIAL (BEAKER) (test code = 7.54         7.35-7.45    H      

      



             383)                                                

 

             PCO2 ARTERIAL (BEAKER) (test code 37 mm Hg     35-45               

      



             = 384)                                              

 

             PO2 ARTERIAL (BEAKER) (test code = 142 mm Hg    80-90        H     

       



             385)                                                

 

             O2 SATURATION ARTERIAL (BEAKER) 99.1 %       96.0-97.0    H        

    



             (test code = 386)                                        

 

             HCO3 ARTERIAL (BEAKER) (test code 31 mmol/L    21-29        H      

      



             = 388)                                              

 

             BASE EXCESS ARTERIAL (BEAKER) 8.3 mmol/L   -2.0-3.0     H          

  



             (test code = 387)                                        

 

             PATIENT TEMPERATURE (BEAKER) (test 36.9                            

       



             code = 1818)                                        

 

             FIO2 (BEAKER) (test code = 1819) 40.0                              

     



BLOOD GAS, [id number] 08:55:00





             Test Item    Value        Reference Range Interpretation Comments

 

             PH ARTERIAL (BEAKER) (test code = 7.52         7.35-7.45    H      

      



             383)                                                

 

             PCO2 ARTERIAL (BEAKER) (test code 38 mm Hg     35-45               

      



             = 384)                                              

 

             PO2 ARTERIAL (BEAKER) (test code = 177 mm Hg    80-90        H     

       



             385)                                                

 

             O2 SATURATION ARTERIAL (BEAKER) 99.3 %       96.0-97.0    H        

    



             (test code = 386)                                        

 

             HCO3 ARTERIAL (BEAKER) (test code 31 mmol/L    21-29        H      

      



             = 388)                                              

 

             BASE EXCESS ARTERIAL (BEAKER) 7.2 mmol/L   -2.0-3.0     H          

  



             (test code = 387)                                        

 

             PATIENT TEMPERATURE (BEAKER) (test 37.2                            

       



             code = 1818)                                        

 

             FIO2 (BEAKER) (test code = 1819) 40.0                              

     



CBC W/PLT COUNT &amp; AUTO [id number] 06:55:00





             Test Item    Value        Reference Range Interpretation Comments

 

             WHITE BLOOD CELL COUNT (BEAKER) 3.3 K/ L     3.5-10.5     L        

    



             (test code = 775)                                        

 

             RED BLOOD CELL COUNT (BEAKER) 2.02 M/ L    4.63-6.08    L          

  



             (test code = 761)                                        

 

             HEMOGLOBIN (BEAKER) (test code = 6.6 GM/DL    13.7-17.5    L       

     



             410)                                                

 

             HEMATOCRIT (BEAKER) (test code = 19.9 %       40.1-51.0    L       

     



             411)                                                

 

             MEAN CORPUSCULAR VOLUME (BEAKER) 98.5 fL      79.0-92.2    H       

     



             (test code = 753)                                        

 

             MEAN CORPUSCULAR HEMOGLOBIN 32.7 pg      25.7-32.2    H            



             (BEAKER) (test code = 751)                                        

 

             MEAN CORPUSCULAR HEMOGLOBIN CONC 33.2 GM/DL   32.3-36.5            

     



             (BEAKER) (test code = 752)                                        

 

             RED CELL DISTRIBUTION WIDTH 16.0 %       11.6-14.4    H            



             (BEAKER) (test code = 412)                                        

 

             PLATELET COUNT (BEAKER) (test code 48 K/CU MM   150-450      L     

       



             = 756)                                              

 

             MEAN PLATELET VOLUME (BEAKER) 10.8 fL      9.4-12.4                

  



             (test code = 754)                                        

 

             NUCLEATED RED BLOOD CELLS (BEAKER) 0 /100 WBC   0-0                

       



             (test code = 413)                                        



(CELLAVISION MANUAL DIFF)2021 06:55:00





             Test Item    Value        Reference Range Interpretation Comments

 

             NEUTROPHILS - REL 71 %                                   



             (CELLAVISION)(BEAKER) (test code =                                 

       



             2816)                                               

 

             LYMPHOCYTES - REL 19 %                                   



             (CELLAVISION)(BEAKER) (test code =                                 

       



             2817)                                               

 

             MONOCYTES - REL 8 %                                    



             (CELLAVISION)(BEAKER) (test code =                                 

       



             3448)                                               

 

             BANDS - REL (CELLAVISION)(BEAKER) 1 %          0-10                

      



             (test code = 2826)                                        

 

             NEUTROPHILS - ABS 2.34 K/ul    1.78-5.38                 



             (CELLAVISION)(BEAKER) (test code =                                 

       



             2830)                                               

 

             LYMPHOCYTES - ABS 0.63 K/ul    1.32-3.57    L            



             (CELLAVISION)(BEAKER) (test code =                                 

       



             2831)                                               

 

             MONOCYTES - ABS 0.26 K/uL    0.30-0.82    L            



             (CELLAVISION)(BEAKER) (test code =                                 

       



             2832)                                               

 

             BANDS - ABS (CELLAVISION)(BEAKER) 0.03 K/uL    0.00-0.80           

      



             (test code = 2840)                                        

 

             TOTAL COUNTED (BEAKER) (test code = 100                            

        



             1351)                                               

 

             RBC MORPHOLOGY (BEAKER) (test code Normal                          

       



             = 762)                                              

 

             WBC MORPHOLOGY (BEAKER) (test code Normal                          

       



             = 487)                                              

 

             PLT MORPHOLOGY (BEAKER) (test code Normal                          

       



             = 486)                                              

 

             ARTIFACT (CELLAVISION)(BEAKER) Present                             

   



             (test code = 3432)                                        

 

             PLATELET CONCENTRATION Decreased                              



             (CELLAVISION)(BEAKER) (test code =                                 

       



             3438)                                               



 ID - [id number] ID - Margarita OverholtUser comments: Slide comments:
CALCIUM, [id number] 05:22:00





             Test Item    Value        Reference Range Interpretation Comments

 

             CALCIUM IONIZED (BEAKER) (test 1.02 mmol/L  1.12-1.27    L         

   



             code = 698)                                         

 

             PH, BLOOD (BEAKER) (test code = 7.51                               

    



             1810)                                               



BASIC METABOLIC [id number] 04:18:00





             Test Item    Value        Reference Range Interpretation Comments

 

             SODIUM (BEAKER) 142 meq/L    136-145                   



             (test code = 381)                                        

 

             POTASSIUM (BEAKER) 3.5 meq/L    3.5-5.1                   



             (test code = 379)                                        

 

             CHLORIDE (BEAKER) 104 meq/L                        



             (test code = 382)                                        

 

             CO2 (BEAKER) (test 32 meq/L     22-29        H            



             code = 355)                                         

 

             BLOOD UREA NITROGEN 14 mg/dL     7-21                      



             (BEAKER) (test code                                        



             = 354)                                              

 

             CREATININE (BEAKER) 0.50 mg/dL   0.57-1.25    L            



             (test code = 358)                                        

 

             GLUCOSE RANDOM 161 mg/dL           H            



             (BEAKER) (test code                                        



             = 652)                                              

 

             CALCIUM (BEAKER) 7.2 mg/dL    8.4-10.2     L            



             (test code = 697)                                        

 

             EGFR (BEAKER) (test 173 mL/min/1.73                           ESTIM

ATED GFR IS



             code = 1092) sq m                                   NOT AS ACCURATE

 AS



                                                                 CREATININE



                                                                 CLEARANCE IN



                                                                 PREDICTING



                                                                 GLOMERULAR



                                                                 FILTRATION RATE

.



                                                                 ESTIMATED GFR I

S



                                                                 NOT APPLICABLE 

FOR



                                                                 DIALYSIS PATIEN

TS.



 ID - SHANNAN [id number] 04:15:00





             Test Item    Value        Reference Range Interpretation Comments

 

             MAGNESIUM (BEAKER) (test code = 2.2 mg/dL    1.6-2.6               

    



             627)                                                



 ID - SHANNAN [id number] 04:15:00





             Test Item    Value        Reference Range Interpretation Comments

 

             PHOSPHORUS (BEAKER) (test code = 2.4 mg/dL    2.3-4.7              

     



             604)                                                



 ID - SHANNAN WBLOOD GAS, [id number] 04:07:00





             Test Item    Value        Reference Range Interpretation Comments

 

             PH ARTERIAL (BEAKER) (test code = 7.50         7.35-7.45    H      

      



             383)                                                

 

             PCO2 ARTERIAL (BEAKER) (test code 42 mm Hg     35-45               

      



             = 384)                                              

 

             PO2 ARTERIAL (BEAKER) (test code = 189 mm Hg    80-90        H     

       



             385)                                                

 

             O2 SATURATION ARTERIAL (BEAKER) 99.4 %       96.0-97.0    H        

    



             (test code = 386)                                        

 

             HCO3 ARTERIAL (BEAKER) (test code 32 mmol/L    21-29        H      

      



             = 388)                                              

 

             BASE EXCESS ARTERIAL (BEAKER) 8.6 mmol/L   -2.0-3.0     H          

  



             (test code = 387)                                        

 

             PATIENT TEMPERATURE (BEAKER) (test 37.5                            

       



             code = 1818)                                        

 

             FIO2 (BEAKER) (test code = 1819) 50.0                              

     



RAD, CHEST, 1 VIEW, NON [id number] 03:37:00Reason for exam:-&gt;loculated 
pleural effusion complicated by respiratory failure, s/p pleural pigtail 
catheter placement (left)Should this be performed at the bedside?-&gt;Yes
************************************************************[facility] : 1965 Sex: 
M************************************************************FINAL REPORT 
PATIENT ID: [id number] RAD, CHEST, 1 VIEW, NON DEPT INDICATION: loculated pleural 
effusion complicated by respiratory failure, s/p pleural pigtail catheter 
placement (left) COMPARISON: Prior day's exam FINDINGS: Portable frontal view of
the chest. IMPRESSION: Support Lines: Interval removal of the right IJ [place]-Jw
catheter with central venous catheter overlying the SVC. Otherwise unchanged 
supportapparatus. Lungs and pleura: Improved aeration of the right lung base 
with persistent discoid atelectasis. Unchanged hazy interstitial airspace 
opacities in left lung with consolidative airspace opacities in the left base 
and small left pleural effusion. No pneumothorax.Heart and mediastinum: Stable 
contours. Additional findings: None. Signed: Deandra Angulo 
Verified Date/Time: 2021 03:37:29 Electronically signed by: DEANDRA ANGULO MD on 2021 03:37 AMCBC W/PLT COUNT &amp; AUTO DIFFERENTIAL
2021 15:40:00





             Test Item    Value        Reference Range Interpretation Comments

 

             WHITE BLOOD CELL COUNT (BEAKER) 2.8 K/ L     3.5-10.5     L        

    



             (test code = 775)                                        

 

             RED BLOOD CELL COUNT (BEAKER) 2.20 M/ L    4.63-6.08    L          

  



             (test code = 761)                                        

 

             HEMOGLOBIN (BEAKER) (test code = 7.1 GM/DL    13.7-17.5    L       

     



             410)                                                

 

             HEMATOCRIT (BEAKER) (test code = 21.6 %       40.1-51.0    L       

     



             411)                                                

 

             MEAN CORPUSCULAR VOLUME (BEAKER) 98.2 fL      79.0-92.2    H       

     



             (test code = 753)                                        

 

             MEAN CORPUSCULAR HEMOGLOBIN 32.3 pg      25.7-32.2    H            



             (BEAKER) (test code = 751)                                        

 

             MEAN CORPUSCULAR HEMOGLOBIN CONC 32.9 GM/DL   32.3-36.5            

     



             (BEAKER) (test code = 752)                                        

 

             RED CELL DISTRIBUTION WIDTH 17.1 %       11.6-14.4    H            



             (BEAKER) (test code = 412)                                        

 

             PLATELET COUNT (BEAKER) (test code 66 K/CU MM   150-450      L     

       



             = 756)                                              

 

             MEAN PLATELET VOLUME (BEAKER) 10.8 fL      9.4-12.4                

  



             (test code = 754)                                        

 

             NUCLEATED RED BLOOD CELLS (BEAKER) 0 /100 WBC   0-0                

       



             (test code = 413)                                        



BRONCHIAL CULTURE + GRAM [id number] 08:31:00





             Test Item    Value        Reference    Interpretation Comments



                                       Range                     

 

             CULTURE (BEAKER) STAPHYLOCOCCUS              A            <1+ Staph

ylococcus



             (test code = 1095) AUREUS                                 aureus

 

             Clindamycin (test                           S            



             code = 10)                                          

 

             Erythromycin (test                           S            



             code = 4)                                           

 

             Linezolid (test code                           S            



             = 40)                                               

 

             Nitrofurantoin (test                           S            



             code = 23)                                          

 

             Oxacillin (test code                           S            



             = 14)                                               

 

             Rifampin (test code =                           S            



             43)                                                 

 

             Tetracycline (test                           S            



             code = 2)                                           

 

             Trimethoprim +                           S            



             Sulfamethoxazole                                        



             (test code = 47)                                        

 

             Vancomycin (test code                           S            



             = 13)                                               

 

             GRAM STAIN RESULT 1+ WBCs                                



             (BEAKER) (test code =                                        



             1123)                                               

 

             GRAM STAIN RESULT No organisms seen                           



             (BEAKER) (test code =                                        



             989672)                                             



&lt;1+ Normal respiratory meme presentSPIN/CONCENTRATION [id number] 
07:53:00





             Test Item    Value        Reference Range Interpretation Comments

 

             CONCENTRATION CHARGED (BEAKER) (test Done                          

         



             code = 2657)                                        



SPIN/CONCENTRATION [id number] 07:52:00





             Test Item    Value        Reference Range Interpretation Comments

 

             CONCENTRATION CHARGED (BEAKER) (test Done                          

         



             code = 2657)                                        



CBC W/PLT COUNT &amp; AUTO [id number] 07:02:00





             Test Item    Value        Reference Range Interpretation Comments

 

             WHITE BLOOD CELL COUNT 3.7 K/ L     3.5-10.5                  



             (BEAKER) (test code =                                        



             775)                                                

 

             RED BLOOD CELL COUNT 2.15 M/ L    4.63-6.08    L            



             (BEAKER) (test code =                                        



             761)                                                

 

             HEMOGLOBIN (BEAKER) 7.1 GM/DL    13.7-17.5    L            



             (test code = 410)                                        

 

             HEMATOCRIT (BEAKER) 20.9 %       40.1-51.0    L            



             (test code = 411)                                        

 

             MEAN CORPUSCULAR 97.2 fL      79.0-92.2    H            



             VOLUME (BEAKER) (test                                        



             code = 753)                                         

 

             MEAN CORPUSCULAR 33.0 pg      25.7-32.2    H            



             HEMOGLOBIN (BEAKER)                                        



             (test code = 751)                                        

 

             MEAN CORPUSCULAR 34.0 GM/DL   32.3-36.5                 



             HEMOGLOBIN CONC                                        



             (BEAKER) (test code =                                        



             752)                                                

 

             RED CELL DISTRIBUTION 17.3 %       11.6-14.4    H            Discor

dant PLT result



             WIDTH (BEAKER) (test                                        compare

d to previous



             code = 412)                                         result; clinica

l



                                                                 correlation



                                                                 required.B.no.3

[id number]



                                                                 ok to released



                                                                 result.

 

             PLATELET COUNT 87 K/CU MM   150-450      L            



             (BEAKER) (test code =                                        



             756)                                                

 

             MEAN PLATELET VOLUME 10.7 fL      9.4-12.4                  



             (BEAKER) (test code =                                        



             754)                                                

 

             NUCLEATED RED BLOOD 0 /100 WBC   0-0                       



             CELLS (BEAKER) (test                                        



             code = 413)                                         



(CELLAVISION MANUAL DIFF)2021 07:02:00





             Test Item    Value        Reference Range Interpretation Comments

 

             NEUTROPHILS - REL 73 %                                   



             (CELLAVISION)(BEAKER) (test code =                                 

       



             2816)                                               

 

             LYMPHOCYTES - REL 16 %                                   



             (CELLAVISION)(BEAKER) (test code =                                 

       



             2817)                                               

 

             MONOCYTES - REL 3 %                                    



             (CELLAVISION)(BEAKER) (test code =                                 

       



             2818)                                               

 

             EOSINOPHILS - REL 2 %                                    



             (CELLAVISION)(BEAKER) (test code =                                 

       



             2819)                                               

 

             BANDS - REL (CELLAVISION)(BEAKER) 6 %          0-10                

      



             (test code = 2826)                                        

 

             NEUTROPHILS - ABS 2.70 K/ul    1.78-5.38                 



             (CELLAVISION)(BEAKER) (test code =                                 

       



             2830)                                               

 

             LYMPHOCYTES - ABS 0.59 K/ul    1.32-3.57    L            



             (CELLAVISION)(BEAKER) (test code =                                 

       



             2831)                                               

 

             MONOCYTES - ABS 0.11 K/uL    0.30-0.82    L            



             (CELLAVISION)(BEAKER) (test code =                                 

       



             2832)                                               

 

             EOSINOPHILS - ABS 0.07 K/uL    0.04-0.54                 



             (CELLAVISION)(BEAKER) (test code =                                 

       



             2834)                                               

 

             BANDS - ABS (CELLAVISION)(BEAKER) 0.22 K/uL    0.00-0.80           

      



             (test code = 2840)                                        

 

             TOTAL COUNTED (BEAKER) (test code = 100                            

        



             1351)                                               

 

             RBC MORPHOLOGY (BEAKER) (test code Normal                          

       



             = 762)                                              

 

             PLT MORPHOLOGY (BEAKER) (test code Normal                          

       



             = 486)                                              

 

             TOXIC GRANULATION (BEAKER) (test Present                           

     



             code = 771)                                         

 

             ARTIFACT (CELLAVISION)(BEAKER) Present                             

   



             (test code = 3432)                                        

 

             PLATELET CONCENTRATION Decreased                              



             (CELLAVISION)(BEAKER) (test code =                                 

       



             3438)                                               



 ID - [id number] ID - Tianna Lidia comments: Slide comments:
BASIC METABOLIC [id number] 05:02:00





             Test Item    Value        Reference Range Interpretation Comments

 

             SODIUM (BEAKER) 142 meq/L    136-145                   



             (test code = 381)                                        

 

             POTASSIUM (BEAKER) 3.8 meq/L    3.5-5.1                   Specimen 

slightly



             (test code = 379)                                        hemolyzed

 

             CHLORIDE (BEAKER) 106 meq/L                        



             (test code = 382)                                        

 

             CO2 (BEAKER) (test 29 meq/L     22-29                     



             code = 355)                                         

 

             BLOOD UREA NITROGEN 16 mg/dL     7-21                      



             (BEAKER) (test code                                        



             = 354)                                              

 

             CREATININE (BEAKER) 0.58 mg/dL   0.57-1.25                 Specimen

 slightly



             (test code = 358)                                        hemolyzed

 

             GLUCOSE RANDOM 148 mg/dL           H            



             (BEAKER) (test code                                        



             = 652)                                              

 

             CALCIUM (BEAKER) 7.3 mg/dL    8.4-10.2     L            



             (test code = 697)                                        

 

             EGFR (BEAKER) (test 146 mL/min/1.73                           ESTIM

ATED GFR IS



             code = 1092) sq m                                   NOT AS ACCURATE

 AS



                                                                 CREATININE



                                                                 CLEARANCE IN



                                                                 PREDICTING



                                                                 GLOMERULAR



                                                                 FILTRATION RATE

.



                                                                 ESTIMATED GFR I

S



                                                                 NOT APPLICABLE 

FOR



                                                                 DIALYSIS PATIEN

TS.



 ID - PIAYA [id number] 04:45:00





             Test Item    Value        Reference Range Interpretation Comments

 

             MAGNESIUM (BEAKER) 2.4 mg/dL    1.6-2.6                   Specimen 

slightly



             (test code = 627)                                        hemolyzed



 ID - PIAYA [id number] 04:45:00





             Test Item    Value        Reference Range Interpretation Comments

 

             PHOSPHORUS (BEAKER) 2.4 mg/dL    2.3-4.7                   Specimen

 slightly



             (test code = 604)                                        hemolyzed



 ID - PIAYA LCALCIUM, [id number] 04:25:00





             Test Item    Value        Reference Range Interpretation Comments

 

             CALCIUM IONIZED (BEAKER) (test 1.04 mmol/L  1.12-1.27    L         

   



             code = 698)                                         

 

             PH, BLOOD (BEAKER) (test code = 7.48                               

    



             1810)                                               



BLOOD GAS, [id number] 04:25:00





             Test Item    Value        Reference Range Interpretation Comments

 

             PH ARTERIAL (BEAKER) (test code = 7.48         7.35-7.45    H      

      



             383)                                                

 

             PCO2 ARTERIAL (BEAKER) (test code 43 mm Hg     35-45               

      



             = 384)                                              

 

             PO2 ARTERIAL (BEAKER) (test code = 143 mm Hg    80-90        H     

       



             385)                                                

 

             O2 SATURATION ARTERIAL (BEAKER) 99.0 %       96.0-97.0    H        

    



             (test code = 386)                                        

 

             HCO3 ARTERIAL (BEAKER) (test code 31 mmol/L    21-29        H      

      



             = 388)                                              

 

             BASE EXCESS ARTERIAL (BEAKER) 6.7 mmol/L   -2.0-3.0     H          

  



             (test code = 387)                                        

 

             PATIENT TEMPERATURE (BEAKER) (test 37.0                            

       



             code = 1818)                                        

 

             FIO2 (BEAKER) (test code = 1819) 50.0                              

     



RAD, CHEST, 1 VIEW, NON [id number] 03:34:00Reason for exam:-&gt;loculated 
pleural effusion complicated by respiratory failure, s/p pleural pigtail 
catheter placement (left)Should this be performed at the bedside?-&gt;Yes
********************************************************[facility] 1965 Sex: 
M************************************************************FINAL REPORT 
PATIENT ID: [id number] RAD, CHEST, 1 VIEW, NON DEPT INDICATION: loculated pleural 
effusion complicated by respiratory failure, s/p pleural pigtail catheter 
placement (left) COMPARISON: Prior day's exam FINDINGS: Portable frontal view of
the chest. IMPRESSION: Support Lines: Stable. Lungs and pleura: Unchanged 
airspace and pleural opacities. No pneumothorax.Heart and mediastinum: Stable 
contours. Additional findings: None. Signed: Deandra Angulo 
Verified Date/Time: 2021 03:34:36 Electronically signed by: DEANDRA ANGULO MD on 2021 03:34 AMBLOOD [id number] 03:01:00





             Test Item    Value        Reference Range Interpretation Comments

 

             CULTURE (BEAKER) (test No growth in 5 days                         

  



             code = 1095)                                        



BLOOD [id number] 03:01:00





             Test Item    Value        Reference Range Interpretation Comments

 

             CULTURE (BEAKER) (test No growth in 5 days                         

  



             code = 1095)                                        



OXYGEN SATURATION, [id number] 20:48:00





             Test Item    Value        Reference Range Interpretation Comments

 

             O2 SATURATION (MEASURED) (BEAKER) 78.2 %                           

      



             (test code = 1455)                                        



CBC W/PLT COUNT &amp; AUTO [id number] 20:42:00





             Test Item    Value        Reference Range Interpretation Comments

 

             WHITE BLOOD CELL COUNT (BEAKER) 4.6 K/ L     3.5-10.5              

    



             (test code = 775)                                        

 

             RED BLOOD CELL COUNT (BEAKER) 2.35 M/ L    4.63-6.08    L          

  



             (test code = 761)                                        

 

             HEMOGLOBIN (BEAKER) (test code = 7.6 GM/DL    13.7-17.5    L       

     



             410)                                                

 

             HEMATOCRIT (BEAKER) (test code = 22.9 %       40.1-51.0    L       

     



             411)                                                

 

             MEAN CORPUSCULAR VOLUME (BEAKER) 97.4 fL      79.0-92.2    H       

     



             (test code = 753)                                        

 

             MEAN CORPUSCULAR HEMOGLOBIN 32.3 pg      25.7-32.2    H            



             (BEAKER) (test code = 751)                                        

 

             MEAN CORPUSCULAR HEMOGLOBIN CONC 33.2 GM/DL   32.3-36.5            

     



             (BEAKER) (test code = 752)                                        

 

             RED CELL DISTRIBUTION WIDTH 17.4 %       11.6-14.4    H            



             (BEAKER) (test code = 412)                                        

 

             PLATELET COUNT (BEAKER) (test 136 K/CU MM  150-450      L          

  



             code = 756)                                         

 

             MEAN PLATELET VOLUME (BEAKER) 10.1 fL      9.4-12.4                

  



             (test code = 754)                                        

 

             NUCLEATED RED BLOOD CELLS 0 /100 WBC   0-0                       



             (BEAKER) (test code = 413)                                        



BLOOD GAS, [id number] 17:10:00





             Test Item    Value        Reference Range Interpretation Comments

 

             PH ARTERIAL (BEAKER) (test code = 7.48         7.35-7.45    H      

      



             383)                                                

 

             PCO2 ARTERIAL (BEAKER) (test code 43 mm Hg     35-45               

      



             = 384)                                              

 

             PO2 ARTERIAL (BEAKER) (test code = 139 mm Hg    80-90        H     

       



             385)                                                

 

             O2 SATURATION ARTERIAL (BEAKER) 98.8 %       96.0-97.0    H        

    



             (test code = 386)                                        

 

             HCO3 ARTERIAL (BEAKER) (test code 30 mmol/L    21-29        H      

      



             = 388)                                              

 

             BASE EXCESS ARTERIAL (BEAKER) 6.6 mmol/L   -2.0-3.0     H          

  



             (test code = 387)                                        

 

             PATIENT TEMPERATURE (BEAKER) (test 38.2                            

       



             code = 1818)                                        

 

             FIO2 (BEAKER) (test code = 1819) 60.0                              

     



BLOOD GAS, [id number] 15:38:00





             Test Item    Value        Reference Range Interpretation Comments

 

             PH ARTERIAL (BEAKER) (test code = 7.52         7.35-7.45    H      

      



             383)                                                

 

             PCO2 ARTERIAL (BEAKER) (test code 39 mm Hg     35-45               

      



             = 384)                                              

 

             PO2 ARTERIAL (BEAKER) (test code = 148 mm Hg    80-90        H     

       



             385)                                                

 

             O2 SATURATION ARTERIAL (BEAKER) 99.0 %       96.0-97.0    H        

    



             (test code = 386)                                        

 

             HCO3 ARTERIAL (BEAKER) (test code 30 mmol/L    21-29        H      

      



             = 388)                                              

 

             BASE EXCESS ARTERIAL (BEAKER) 7.2 mmol/L   -2.0-3.0     H          

  



             (test code = 387)                                        

 

             PATIENT TEMPERATURE (BEAKER) (test 38.8                            

       



             code = 1818)                                        

 

             FIO2 (BEAKER) (test code = 1819) 60.0                              

     



RAD, ABDOMEN/KUB, 1 VIEW [id number] 14:23:00Reason for exam:-&gt;SP corpak 
insertionShould this be performed at the bedside?-&gt;Yes
************************************************************[facility] : 1965 Sex: 
M************************************************************FINAL REPORT 
PATIENT ID: [id number] RAD, ABDOMEN/KUB, 1 VIEW AP INDICATION: SP corpak insertion
COMPARISON: NoneTECHNIQUE: Limited portable radiograph of the lower chest and 
upper abdomen was acquired for purposes of evaluating tube placement 
FINDINGS/IMPRESSION:Feeding tube tip overlies the distal stomach Signed: 
Pamela Rios Verified Date/Time: 2021 14:23:54 Reading 
Location: [facility] Radiology Reading Room Electronically signed by: PAMELA RIOS MD on 2021 02:23 PMRAD, CHEST, 1 VIEW, NON DEPT
2021 13:54:00Reason for exam:-&gt;acute resp failure, SP VATS, 
decortication.Should this be performed at the bedside?-&gt;Yes
************************************************************[facility] : 1965 Sex: 
M************************************************************FINAL REPORT 
PATIENT ID: [id number] RAD, CHEST, 1 VIEW, NON DEPT INDICATION: acute resp 
failure, SP VATS, decortication. COMPARISON: Prior day's exam FINDINGS: Portable
frontal view of the chest. IMPRESSION: Support Lines: Left chest tube. Feeding 
tube descends below the diaphragm. Left-sided central catheter tipoverlies the 
SVC. [place]-Jw tip overlies the pulmonary outflow tract Lungs and pleura: Left 
greater than right interstitial thickening and airspace disease No significant 
pneumothorax.Heart and mediastinum: Stable contours. Stable surgical 
changes.Additional findings: None. Signed: Pamela Rios Verified 
Date/Time: 2021 13:54:03 Reading Location: [facility] Radiology Reading
Room Electronically signed by: PAMELA RIOS MD on 2021 01:54 
PMCBC W/PLT COUNT &amp; AUTO [id number] 13:12:00





             Test Item    Value        Reference Range Interpretation Comments

 

             WHITE BLOOD CELL COUNT (BEAKER) 4.4 K/ L     3.5-10.5              

    



             (test code = 775)                                        

 

             RED BLOOD CELL COUNT (BEAKER) 2.47 M/ L    4.63-6.08    L          

  



             (test code = 761)                                        

 

             HEMOGLOBIN (BEAKER) (test code = 7.9 GM/DL    13.7-17.5    L       

     



             410)                                                

 

             HEMATOCRIT (BEAKER) (test code = 23.4 %       40.1-51.0    L       

     



             411)                                                

 

             MEAN CORPUSCULAR VOLUME (BEAKER) 94.7 fL      79.0-92.2    H       

     



             (test code = 753)                                        

 

             MEAN CORPUSCULAR HEMOGLOBIN 32.0 pg      25.7-32.2                 



             (BEAKER) (test code = 751)                                        

 

             MEAN CORPUSCULAR HEMOGLOBIN CONC 33.8 GM/DL   32.3-36.5            

     



             (BEAKER) (test code = 752)                                        

 

             RED CELL DISTRIBUTION WIDTH 17.4 %       11.6-14.4    H            



             (BEAKER) (test code = 412)                                        

 

             PLATELET COUNT (BEAKER) (test 137 K/CU MM  150-450      L          

  



             code = 756)                                         

 

             MEAN PLATELET VOLUME (BEAKER) 10.2 fL      9.4-12.4                

  



             (test code = 754)                                        

 

             NUCLEATED RED BLOOD CELLS 0 /100 WBC   0-0                       



             (BEAKER) (test code = 413)                                        



(CELLAVISION MANUAL DIFF)2021 13:12:00





             Test Item    Value        Reference Range Interpretation Comments

 

             NEUTROPHILS - REL 73 %                                   



             (CELLAVISION)(BEAKER) (test code =                                 

       



             2816)                                               

 

             LYMPHOCYTES - REL 14 %                                   



             (CELLAVISION)(BEAKER) (test code =                                 

       



             2817)                                               

 

             MONOCYTES - REL 7 %                                    



             (CELLAVISION)(BEAKER) (test code =                                 

       



             2818)                                               

 

             EOSINOPHILS - REL 1 %                                    



             (CELLAVISION)(BEAKER) (test code =                                 

       



             2819)                                               

 

             BANDS - REL (CELLAVISION)(BEAKER) 5 %          0-10                

      



             (test code = 2826)                                        

 

             NEUTROPHILS - ABS 3.21 K/ul    1.78-5.38                 



             (CELLAVISION)(BEAKER) (test code =                                 

       



             2830)                                               

 

             LYMPHOCYTES - ABS 0.62 K/ul    1.32-3.57    L            



             (CELLAVISION)(BEAKER) (test code =                                 

       



             2831)                                               

 

             MONOCYTES - ABS 0.31 K/uL    0.30-0.82                 



             (CELLAVISION)(BEAKER) (test code =                                 

       



             2832)                                               

 

             EOSINOPHILS - ABS 0.04 K/uL    0.04-0.54                 



             (CELLAVISION)(BEAKER) (test code =                                 

       



             2834)                                               

 

             BANDS - ABS (CELLAVISION)(BEAKER) 0.22 K/uL    0.00-0.80           

      



             (test code = 2840)                                        

 

             TOTAL COUNTED (BEAKER) (test code = 100                            

        



             1351)                                               

 

             PLT MORPHOLOGY (BEAKER) (test code Normal                          

       



             = 486)                                              

 

             DOHLE BODIES (BEAKER) (test code = Present                         

       



             359)                                                

 

             TOXIC GRANULATION (BEAKER) (test Present                           

     



             code = 771)                                         

 

             POLYCHROMATOPHILLIC RBCS(BEAKER) 1+ few                            

     



             (test code = 478)                                        

 

             ANISOCYTOSIS (BEAKER) (test code = 1+ few                          

       



             961)                                                

 

             MICROCYTES (BEAKER) (test code = 1+ few                            

     



             965)                                                

 

             PLATELET CONCENTRATION Decreased                              



             (CELLAVISION)(BEAKER) (test code =                                 

       



             3438)                                               



 ID - Tianna Murillo comments: Slide comments:MAGNESIUM
2021 12:57:00





             Test Item    Value        Reference Range Interpretation Comments

 

             MAGNESIUM (BEAKER) (test code = 2.1 mg/dL    1.6-2.6               

    



             627)                                                



 ID - ADMINBASIC METABOLIC [id number] 12:57:00





             Test Item    Value        Reference Range Interpretation Comments

 

             SODIUM (BEAKER) 141 meq/L    136-145                   



             (test code = 381)                                        

 

             POTASSIUM (BEAKER) 4.0 meq/L    3.5-5.1                   



             (test code = 379)                                        

 

             CHLORIDE (BEAKER) 106 meq/L                        



             (test code = 382)                                        

 

             CO2 (BEAKER) (test 29 meq/L     22-29                     



             code = 355)                                         

 

             BLOOD UREA NITROGEN 18 mg/dL     7-21                      



             (BEAKER) (test code                                        



             = 354)                                              

 

             CREATININE (BEAKER) 0.58 mg/dL   0.57-1.25                 



             (test code = 358)                                        

 

             GLUCOSE RANDOM 156 mg/dL           H            



             (BEAKER) (test code                                        



             = 652)                                              

 

             CALCIUM (BEAKER) 7.7 mg/dL    8.4-10.2     L            



             (test code = 697)                                        

 

             EGFR (BEAKER) (test 146 mL/min/1.73                           ESTIM

ATED GFR IS



             code = 1092) sq m                                   NOT AS ACCURATE

 AS



                                                                 CREATININE



                                                                 CLEARANCE IN



                                                                 PREDICTING



                                                                 GLOMERULAR



                                                                 FILTRATION RATE

.



                                                                 ESTIMATED GFR I

S



                                                                 NOT APPLICABLE 

FOR



                                                                 DIALYSIS PATIEN

TS.



 ID - ADMINBLOOD GAS, [id number] 12:51:00





             Test Item    Value        Reference Range Interpretation Comments

 

             PH ARTERIAL (BEAKER) (test code = 7.51         7.35-7.45    H      

      



             383)                                                

 

             PCO2 ARTERIAL (BEAKER) (test code 37 mm Hg     35-45               

      



             = 384)                                              

 

             PO2 ARTERIAL (BEAKER) (test code = 169 mm Hg    80-90        H     

       



             385)                                                

 

             O2 SATURATION ARTERIAL (BEAKER) 99.3 %       96.0-97.0    H        

    



             (test code = 386)                                        

 

             HCO3 ARTERIAL (BEAKER) (test code 29 mmol/L    21-29               

      



             = 388)                                              

 

             BASE EXCESS ARTERIAL (BEAKER) 5.6 mmol/L   -2.0-3.0     H          

  



             (test code = 387)                                        

 

             PATIENT TEMPERATURE (BEAKER) (test 37.0                            

       



             code = 1818)                                        

 

             FIO2 (BEAKER) (test code = 1819) 100.0                             

     



OXYGEN SATURATION, [id number] 12:46:00





             Test Item    Value        Reference Range Interpretation Comments

 

             O2 SATURATION (MEASURED) (BEAKER) 75.6 %                           

      



             (test code = 1455)                                        



LACTIC ACID, [id number] 12:45:00





             Test Item    Value        Reference Range Interpretation Comments

 

             LACTATE BLOOD 1.4 mmol/L   0.5-2.2                   Specimen sligh

tly



             ARTERIAL (2) (BEAKER)                                        hemoly

zed



             (test code = 2874)                                        



 ID - ADMINPROTHROMBIN [id number] 10:42:00





             Test Item    Value        Reference Range Interpretation Comments

 

             PROTIME (BEAKER) 15.9 seconds 11.9-14.2    H            



             (test code = 759)                                        

 

             INR (BEAKER) (test 1.31         See_Comment                [Automat

ed message]



             code = 370)                                         The system [employer]



                                                                 generated this 

result



                                                                 transmitted ref

erence



                                                                 range: <=5.90. 

The



                                                                 reference range

 was



                                                                 not used to int

erpret



                                                                 this result as



                                                                 normal/abnormal

.



Effective 2019: PT Reference Range ChangeNew: 11.9-14.2 Previous: 11.7-
14.7RECOMMENDED COUMADIN/WARFARIN INR THERAPY RANGESSTANDARD DOSE: 2.0-3.0 
Includes: PROPHYLAXIS for venous thrombosis, systemic embolization; TREATMENT 
for venous thrombosis and/or pulmonary embolus.HIGH RISK: Target INR is 2.5-3.5 
for patients wiht mechanical heart valves.[id number] 10:42:00





             Test Item    Value        Reference Range Interpretation Comments

 

             FIBRINOGEN LEVEL (BEAKER) (test 599 mg/dl    225-434      H        

    



             code = 658)                                         



[id number] 10:42:00





             Test Item    Value        Reference Range Interpretation Comments

 

             PARTIAL THROMBOPLASTIN TIME 28.8 seconds 22.5-36.0                 



             (BEAKER) (test code = 760)                                        



PLATELET [id number] 10:34:00





             Test Item    Value        Reference Range Interpretation Comments

 

             PLATELET COUNT (BEAKER) (test 121 K/CU MM  150-450      L          

  



             code = 756)                                         



 ID - [id number] [id number] 09:55:00





             Test Item    Value        Reference Range Interpretation Comments

 

             CULTURE (BEAKER) (test code No MRSA isolated                       

    



             = 1095)                                             



CALCIUM, [id number] 09:49:00





             Test Item    Value        Reference Range Interpretation Comments

 

             CALCIUM IONIZED (BEAKER) (test 1.02 mmol/L  1.12-1.27    L         

   



             code = 698)                                         

 

             PH, BLOOD (BEAKER) (test code = 7.48                               

    



             1810)                                               



BLOOD GAS, [id number] 09:49:00





             Test Item    Value        Reference Range Interpretation Comments

 

             PH ARTERIAL (BEAKER) (test code = 7.49         7.35-7.45    H      

      



             383)                                                

 

             PCO2 ARTERIAL (BEAKER) (test code 36 mm Hg     35-45               

      



             = 384)                                              

 

             PO2 ARTERIAL (BEAKER) (test code = 71 mm Hg     80-90        L     

       



             385)                                                

 

             O2 SATURATION ARTERIAL (BEAKER) 95.9 %       96.0-97.0    L        

    



             (test code = 386)                                        

 

             HCO3 ARTERIAL (BEAKER) (test code 27 mmol/L    21-29               

      



             = 388)                                              

 

             BASE EXCESS ARTERIAL (BEAKER) 3.4 mmol/L   -2.0-3.0     H          

  



             (test code = 387)                                        

 

             PATIENT TEMPERATURE (BEAKER) (test 36.2                            

       



             code = 1818)                                        

 

             FIO2 (BEAKER) (test code = 1819) 100.0                             

     



GLUCOSE-STAT [id number] 09:49:00





             Test Item    Value        Reference Range Interpretation Comments

 

             GLUCOSE RANDOM (BEAKER) (test code 155 mg/dL           H     

       



             = 652)                                              



HGB/HCT (H&amp;H) - STAT [id number] 09:49:00





             Test Item    Value        Reference Range Interpretation Comments

 

             HEMOGLOBIN (BEAKER) (test code = 9.9 GM/DL    13.0-16.8    L       

     



             410)                                                

 

             HEMATOCRIT (BEAKER) (test code = 29.0 %       40.0-50.0    L       

     



             411)                                                



SODIUM NA-STAT [id number] 09:48:00





             Test Item    Value        Reference Range Interpretation Comments

 

             SODIUM (BEAKER) (test code = 381) 136 meq/L    136-145             

      



POTASSIUM-STAT [id number] 09:48:00





             Test Item    Value        Reference Range Interpretation Comments

 

             POTASSIUM (BEAKER) (test code = 3.8 meq/L    3.6-5.5               

    



             379)                                                



BLOOD GAS, [id number] 09:07:00





             Test Item    Value        Reference Range Interpretation Comments

 

             PH ARTERIAL (BEAKER) (test code = 7.45         7.35-7.45           

      



             383)                                                

 

             PCO2 ARTERIAL (BEAKER) (test code 44 mm Hg     35-45               

      



             = 384)                                              

 

             PO2 ARTERIAL (BEAKER) (test code = 117 mm Hg    80-90        H     

       



             385)                                                

 

             O2 SATURATION ARTERIAL (BEAKER) 98.4 %       96.0-97.0    H        

    



             (test code = 386)                                        

 

             HCO3 ARTERIAL (BEAKER) (test code 30 mmol/L    21-29        H      

      



             = 388)                                              

 

             BASE EXCESS ARTERIAL (BEAKER) 5.3 mmol/L   -2.0-3.0     H          

  



             (test code = 387)                                        

 

             PATIENT TEMPERATURE (BEAKER) (test 37.0                            

       



             code = 1818)                                        

 

             FIO2 (BEAKER) (test code = 1819) 90.0                              

     



GLUCOSE-STAT [id number] 09:07:00





             Test Item    Value        Reference Range Interpretation Comments

 

             GLUCOSE RANDOM (BEAKER) (test code 148 mg/dL           H     

       



             = 652)                                              



HGB/HCT (H&amp;H) - STAT [id number] 09:07:00





             Test Item    Value        Reference Range Interpretation Comments

 

             HEMOGLOBIN (BEAKER) (test code = 8.0 GM/DL    13.0-16.8    L       

     



             410)                                                

 

             HEMATOCRIT (BEAKER) (test code = 24.0 %       40.0-50.0    L       

     



             411)                                                



CALCIUM, [id number] 09:07:00





             Test Item    Value        Reference Range Interpretation Comments

 

             CALCIUM IONIZED (BEAKER) (test 1.04 mmol/L  1.12-1.27    L         

   



             code = 698)                                         

 

             PH, BLOOD (BEAKER) (test code = 7.45                               

    



             1810)                                               



SODIUM NA-STAT [id number] 09:05:00





             Test Item    Value        Reference Range Interpretation Comments

 

             SODIUM (BEAKER) (test code = 381) 136 meq/L    136-145             

      



POTASSIUM-STAT [id number] 09:05:00





             Test Item    Value        Reference Range Interpretation Comments

 

             POTASSIUM (BEAKER) (test code = 3.7 meq/L    3.6-5.5               

    



             379)                                                



CBC W/PLT COUNT &amp; AUTO [id number] 07:16:00





             Test Item    Value        Reference Range Interpretation Comments

 

             WHITE BLOOD CELL COUNT (BEAKER) 4.1 K/ L     3.5-10.5              

    



             (test code = 775)                                        

 

             RED BLOOD CELL COUNT (BEAKER) 2.06 M/ L    4.63-6.08    L          

  



             (test code = 761)                                        

 

             HEMOGLOBIN (BEAKER) (test code = 6.9 GM/DL    13.7-17.5    L       

     



             410)                                                

 

             HEMATOCRIT (BEAKER) (test code = 20.8 %       40.1-51.0    L       

     



             411)                                                

 

             MEAN CORPUSCULAR VOLUME (BEAKER) 101.0 fL     79.0-92.2    H       

     



             (test code = 753)                                        

 

             MEAN CORPUSCULAR HEMOGLOBIN 33.5 pg      25.7-32.2    H            



             (BEAKER) (test code = 751)                                        

 

             MEAN CORPUSCULAR HEMOGLOBIN CONC 33.2 GM/DL   32.3-36.5            

     



             (BEAKER) (test code = 752)                                        

 

             RED CELL DISTRIBUTION WIDTH 16.7 %       11.6-14.4    H            



             (BEAKER) (test code = 412)                                        

 

             PLATELET COUNT (BEAKER) (test code 59 K/CU MM   150-450      L     

       



             = 756)                                              

 

             MEAN PLATELET VOLUME (BEAKER) 10.2 fL      9.4-12.4                

  



             (test code = 754)                                        

 

             NUCLEATED RED BLOOD CELLS (BEAKER) 0 /100 WBC   0-0                

       



             (test code = 413)                                        



(CELLAVISION MANUAL DIFF)2021 07:16:00





             Test Item    Value        Reference Range Interpretation Comments

 

             NEUTROPHILS - REL 74 %                                   



             (CELLAVISION)(BEAKER) (test code =                                 

       



             2816)                                               

 

             LYMPHOCYTES - REL 14 %                                   



             (CELLAVISION)(BEAKER) (test code =                                 

       



             2817)                                               

 

             MONOCYTES - REL 7 %                                    



             (CELLAVISION)(BEAKER) (test code =                                 

       



             2818)                                               

 

             BANDS - REL (CELLAVISION)(BEAKER) 2 %          0-10                

      



             (test code = 2826)                                        

 

             ATYPICAL LYMPHOCYTES - REL 2 %          0-0          H            



             (CELLAVISION)(BEAKER) (test code =                                 

       



             2829)                                               

 

             NEUTROPHILS - ABS 3.03 K/ul    1.78-5.38                 



             (CELLAVISION)(BEAKER) (test code =                                 

       



             2830)                                               

 

             LYMPHOCYTES - ABS 0.57 K/ul    1.32-3.57    L            



             (CELLAVISION)(BEAKER) (test code =                                 

       



             2831)                                               

 

             MONOCYTES - ABS 0.29 K/uL    0.30-0.82    L            



             (CELLAVISION)(BEAKER) (test code =                                 

       



             2832)                                               

 

             BANDS - ABS (CELLAVISION)(BEAKER) 0.08 K/uL    0.00-0.80           

      



             (test code = 2840)                                        

 

             ATYPICAL LYMPHOCYTES - ABS 0.08 K/uL    0.00-0.00    H            



             (CELLAVISION)(BEAKER) (test code =                                 

       



             2858)                                               

 

             TOTAL COUNTED (BEAKER) (test code = 100                            

        



             1351)                                               

 

             PLT MORPHOLOGY (BEAKER) (test code Normal                          

       



             = 486)                                              

 

             DOHLE BODIES (BEAKER) (test code = Present                         

       



             359)                                                

 

             TOXIC GRANULATION (BEAKER) (test Present                           

     



             code = 771)                                         

 

             ANISOCYTOSIS (BEAKER) (test code = 1+ few                          

       



             961)                                                

 

             ARTIFACT (CELLAVISION)(BEAKER) Present                             

   



             (test code = 3432)                                        

 

             PLATELET CONCENTRATION Decreased                              



             (CELLAVISION)(BEAKER) (test code =                                 

       



             3438)                                               



 ID - Tianna Lidia comments: Slide comments:PREALBUMIN
2021 04:12:00





             Test Item    Value        Reference Range Interpretation Comments

 

             PREALBUMIN (BEAKER) 4 mg/dL      14-45        L            Specimen

 slightly



             (test code = 586)                                        hemolyzed



 ID - ADMINBASIC METABOLIC [id number] 03:55:00





             Test Item    Value        Reference Range Interpretation Comments

 

             SODIUM (BEAKER) 140 meq/L    136-145                   



             (test code = 381)                                        

 

             POTASSIUM (BEAKER) 3.9 meq/L    3.5-5.1                   



             (test code = 379)                                        

 

             CHLORIDE (BEAKER) 106 meq/L                        



             (test code = 382)                                        

 

             CO2 (BEAKER) (test 28 meq/L     22-29                     



             code = 355)                                         

 

             BLOOD UREA NITROGEN 20 mg/dL     7-21                      



             (BEAKER) (test code                                        



             = 354)                                              

 

             CREATININE (BEAKER) 0.60 mg/dL   0.57-1.25                 



             (test code = 358)                                        

 

             GLUCOSE RANDOM 149 mg/dL           H            



             (BEAKER) (test code                                        



             = 652)                                              

 

             CALCIUM (BEAKER) 7.7 mg/dL    8.4-10.2     L            



             (test code = 697)                                        

 

             EGFR (BEAKER) (test 140 mL/min/1.73                           ESTIM

ATED GFR IS



             code = 1092) sq m                                   NOT AS ACCURATE

 AS



                                                                 CREATININE



                                                                 CLEARANCE IN



                                                                 PREDICTING



                                                                 GLOMERULAR



                                                                 FILTRATION RATE

.



                                                                 ESTIMATED GFR I

S



                                                                 NOT APPLICABLE 

FOR



                                                                 DIALYSIS PATIEN

TS.



 ID - [id number] 03:52:00





             Test Item    Value        Reference Range Interpretation Comments

 

             MAGNESIUM (BEAKER) (test code = 2.2 mg/dL    1.6-2.6               

    



             627)                                                



 ID - [id number] 03:52:00





             Test Item    Value        Reference Range Interpretation Comments

 

             PHOSPHORUS (BEAKER) (test code = 2.3 mg/dL    2.3-4.7              

     



             604)                                                



 ID - ADMINPROTEIN, [id number] 03:52:00





             Test Item    Value        Reference Range Interpretation Comments

 

             TOTAL PROTEIN (BEAKER) (test code = 5.0 gm/dL    6.0-8.3      L    

        



             770)                                                



 ID - [id number] 03:52:00





             Test Item    Value        Reference Range Interpretation Comments

 

             ALBUMIN (BEAKER) (test code = 1145) 2.3 g/dL     3.5-5.0      L    

        



 ID - ADMINLACTIC ACID, [id number] 03:41:00





             Test Item    Value        Reference Range Interpretation Comments

 

             LACTATE BLOOD 1.0 mmol/L   0.5-2.2                   Specimen sligh

tly



             ARTERIAL (2) (BEAKER)                                        hemoly

zed



             (test code = 2874)                                        



 ID - ADMINPROTHROMBIN [id number] 03:39:00





             Test Item    Value        Reference Range Interpretation Comments

 

             PROTIME (BEAKER) 15.7 seconds 11.9-14.2    H            



             (test code = 759)                                        

 

             INR (BEAKER) (test 1.30         See_Comment                [Automat

ed message]



             code = 370)                                         The system [employer]



                                                                 generated this 

result



                                                                 transmitted ref

erence



                                                                 range: <=5.90. 

The



                                                                 reference range

 was



                                                                 not used to int

erpret



                                                                 this result as



                                                                 normal/abnormal

.



Effective 2019: PT Reference Range ChangeNew: 11.9-14.2 Previous: 11.7-
14.7RECOMMENDED COUMADIN/WARFARIN INR THERAPY RANGESSTANDARD DOSE: 2.0-3.0 
Includes: PROPHYLAXIS for venous thrombosis, systemic embolization; TREATMENT 
for venous thrombosis and/or pulmonary embolus.HIGH RISK: Target INR is 2.5-3.5 
for patients wiht mechanical heart valves.BLOOD GAS, [id number] 03:29:00





             Test Item    Value        Reference Range Interpretation Comments

 

             PH ARTERIAL (BEAKER) (test code = 7.42         7.35-7.45           

      



             383)                                                

 

             PCO2 ARTERIAL (BEAKER) (test code 45 mm Hg     35-45               

      



             = 384)                                              

 

             PO2 ARTERIAL (BEAKER) (test code = 130 mm Hg    80-90        H     

       



             385)                                                

 

             O2 SATURATION ARTERIAL (BEAKER) 98.5 %       96.0-97.0    H        

    



             (test code = 386)                                        

 

             HCO3 ARTERIAL (BEAKER) (test code 28 mmol/L    21-29               

      



             = 388)                                              

 

             BASE EXCESS ARTERIAL (BEAKER) 3.9 mmol/L   -2.0-3.0     H          

  



             (test code = 387)                                        

 

             PATIENT TEMPERATURE (BEAKER) (test 38.5                            

       



             code = 1818)                                        

 

             FIO2 (BEAKER) (test code = 1819) 60.0                              

     



RAD, CHEST, 1 VIEW, NON [id number] 02:44:00Reason for exam:-&gt;loculated 
pleural effusion complicated by respiratory failure, s/p pleural pigtail 
catheter placement (left)Should this be performed at the bedside?-&gt;Yes
************************************************************[facility] : 1965 Sex: 
M************************************************************FINAL REPORT 
PATIENT ID: [id number] CLINICAL INDICATION: Support lines. Comparison: 2021 
at 2106 hours The cardiomediastinal contours are stable. The lung volumes remain
low. Central pulmonary vascular anastomotic and left greater than right 
parenchymal and pleural opacities are unchanged. There is no pneumothorax. 
Support lines are stable. Signed: Dhruv Werner MDReport Verified Date/Time: 
2021 02:44:40 Electronically signed by: DHRUV WERNER M.D. on 2021
02:44 AMTHROMBOELASTOGRAPH (TEG)2021 21:45:00





             Test Item    Value        Reference Range Interpretation Comments

 

             TEG ACTIVATED CLOTTING TIME 8.1 minutes  4.0-7.0      H            



             (BEAKER) (test code = 1407)                                        

 

             TEG FIBRINOGEN ACTIVITY (BEAKER) 53.8 degrees 61.0-73.0    L       

     



             (test code = 1408)                                        

 

             TEG PLT. AGGREGATION (BEAKER) 69.3 MM      55.0-65.0    H          

  



             (test code = 1409)                                        

 

             TGH ACTIVATED CLOTTING TIME 8.2 minutes  4.0-7.0      H            



             (BEAKER) (test code = 1411)                                        

 

             TGH FIBRINOGEN ACTIVITY (BEAKER) 54.1 degrees 61.0-73.0    L       

     



             (test code = 1412)                                        

 

             TGH PLT. AGGREGATION (BEAKER) 69.3 MM      55.0-65.0    H          

  



             (test code = 1413)                                        



RAD, CHEST, 1 VIEW, NON [id number] 21:31:00Reason for exam:-&gt;Large chest 
tube bleedingShould this be performed at the bedside?-&gt;Yes
************************************************************[facility] : 1965 Sex: 
M************************************************************FINAL REPORT 
PATIENT ID: [id number] RAD, CHEST, 1 VIEW, NON DEPT INDICATION: Large chest tube 
bleeding COMPARISON: Same day's examination. FINDINGS: Portable frontal view of 
the chest. IMPRESSION: Support Lines: Shah interval repositioning of the left 
pleural pigtail catheter may be in part related to differences in patient 
positioning. Otherwise unchanged support apparatus. Lungs and pleura: Improved 
aeration of the left lung with persistent hazy airspace opacity and moderate 
left pleural effusion. Worsening hazy airspace opacity in the right lung base 
may represent increasing right pleural effusion versus differences in patient 
positioning. No pneumothorax.Heart and mediastinum: Largely obscured however kip
ssly unchanged. Additional findings: None. Signed: [facility]ulo[facility] 
Verified Date/Time: 2021 21:31:19 Electronically signed by: DEANDRA ANGULO MD on 2021 09:31 PMSARS-COV2/RT-PCR ([facility] &[facility]; REF LABS)
2021 21:18:00





             Test Item    Value        Reference Range Interpretation Comments

 

             SARS-COV2/RT-PCR (test code Negative     Not Detected, Negative,   

           



             = [id number])                See external report for              



                                       linked test               

 

             SARS-COV-2 PERFORMING LAB [facility]                                  



             (test code = 1875093)                                        



Negative results do not preclude SARS-CoV-2 infection and should not be used as 
the sole basis for patient management decisions. Negative results must be 
combined with clinical observations, patient history, and epidemiological 
information. A false negative result may occur if a specimen is improperly
collected, transported or handled.The limit of detection for this assay is 250 
copies/mL.This SARS CoV-2 test is a rapid, real-time RT-PCR test intended for 
the qualitative detection of nucleic acid from SARS-CoV-2 in a nasopharyngeal 
swab specimen collected from individuals suspected of COVID-19 by their 
healthcare provider.This test has not been Food and Drug Administration (FDA) 
cleared or approved and has been authorized by FDA under an Emergency Use 
Authorization (EUA). This EUA will be effective until the declaration that 
circumstances exist justifying the authorization of the emergency use of in 
vitro diagnostic tests for detection and/or diagnosis of COVID-19 is terminated 
under Section 564(b)(2) of the Act or the EUA is revoked under Section 564(g) of
the Act.Fact Sheet for Healthcare Pro
viders:https://www.[employer]/Documents/Xpert%20Xpress%20SARS%20CoV-2/Fact%20Sh

eets/3023802%64EYES-XHP-8%20HEALTHCARE%20PROVIDERS%20FACT%20SHEET.pdfFact Sheet
for Healthcare Patients:https://www.[employer]/Documents/Xpert%20Xpress%20SARS%20CoV-2/Fact%20Sheets/302-3801%20SARS-COV

-2%20PATIENT%20FACT%20SHEET.pdfPerforming Laboratory:[facility], [place] [id number] 21:14:00





             Test Item    Value        Reference Range Interpretation Comments

 

             PROTIME (BEAKER) 15.9 seconds 11.9-14.2    H            



             (test code = 759)                                        

 

             INR (BEAKER) (test 1.31         See_Comment                [Automat

ed message]



             code = 370)                                         The system [employer]



                                                                 generated this 

result



                                                                 transmitted ref

erence



                                                                 range: <=5.90. 

The



                                                                 reference range

 was



                                                                 not used to int

erpret



                                                                 this result as



                                                                 normal/abnormal

.



Effective 2019: PT Reference Range ChangeNew: 11.9-14.2 Previous: 11.7-
14.7RECOMMENDED COUMADIN/WARFARIN INR THERAPY RANGESSTANDARD DOSE: 2.0-3.0 
Includes: PROPHYLAXIS for venous thrombosis, systemic embolization; TREATMENT 
for venous thrombosis and/or pulmonary embolus.HIGH RISK: Target INR is 2.5-3.5 
for patients wiht mechanical heart valves.[id number] 21:14:00





             Test Item    Value        Reference Range Interpretation Comments

 

             FIBRINOGEN LEVEL (BEAKER) (test 761 mg/dl    225-434      H        

    



             code = 658)                                         



[id number] 21:14:00





             Test Item    Value        Reference Range Interpretation Comments

 

             PARTIAL THROMBOPLASTIN TIME 32.2 seconds 22.5-36.0                 



             (BEAKER) (test code = 760)                                        



OXYGEN SATURATION, [id number] 21:08:00





             Test Item    Value        Reference Range Interpretation Comments

 

             O2 SATURATION (MEASURED) (BEAKER) 75.5 %                           

      



             (test code = 1455)                                        



CBC (HEMOGRAM ONLY)2021 21:06:00





             Test Item    Value        Reference Range Interpretation Comments

 

             WHITE BLOOD CELL COUNT (BEAKER) 6.4 K/ L     3.5-10.5              

    



             (test code = 775)                                        

 

             RED BLOOD CELL COUNT (BEAKER) 2.41 M/ L    4.63-6.08    L          

  



             (test code = 761)                                        

 

             HEMOGLOBIN (BEAKER) (test code = 7.9 GM/DL    13.7-17.5    L       

     



             410)                                                

 

             HEMATOCRIT (BEAKER) (test code = 24.8 %       40.1-51.0    L       

     



             411)                                                

 

             MEAN CORPUSCULAR VOLUME (BEAKER) 102.9 fL     79.0-92.2    H       

     



             (test code = 753)                                        

 

             MEAN CORPUSCULAR HEMOGLOBIN 32.8 pg      25.7-32.2    H            



             (BEAKER) (test code = 751)                                        

 

             MEAN CORPUSCULAR HEMOGLOBIN CONC 31.9 GM/DL   32.3-36.5    L       

     



             (BEAKER) (test code = 752)                                        

 

             RED CELL DISTRIBUTION WIDTH 16.2 %       11.6-14.4    H            



             (BEAKER) (test code = 412)                                        

 

             PLATELET COUNT (BEAKER) (test code 54 K/CU MM   150-450      L     

       



             = 756)                                              

 

             MEAN PLATELET VOLUME (BEAKER) 9.7 fL       9.4-12.4                

  



             (test code = 754)                                        

 

             NUCLEATED RED BLOOD CELLS (BEAKER) 0 /100 WBC   0-0                

       



             (test code = 413)                                        



RAD, CHEST, 1 VIEW, NON [id number] 15:40:00Reason for exam:-&gt;PA Catheter 
PlacementShould this be performed at the bedside?-&gt;Yes
************************************************************[facility] 1965 Sex: 
M************************************************************FINAL REPORT 
PATIENT ID: [id number] RAD, CHEST, 1 VIEW, NON DEPT TECHNIQUE: Frontal view(s) of 
the chest. INDICATION: PA Catheter Placement. COMPARISON: Chest radiograph 2 
hours prior FINDINGS/IMPRESSION: Lines/Tubes: Right sided [place]-Jw catheter, 
tip projects over the expected location of the main pulmonary orright 
ventricular outflow tract. Left transjugular catheter tip in the left 
brachiocephalic vein. Otherwise lines and tubes are unchanged Lungs/pleura: No 
change in the complete left lung opacity. No pneumothorax. Heart and 
Mediastinum: Unchanged. Soft Tissues and Bones: Unchanged. Signed: Dorothea Paz Verified Date/Time: 2021 15:40:19 Reading Location: [facility] C013X 
Ortho Consult ReadingRoom Electronically signed by: DOROTHEA PAZ MD on 
2021 03:40 PMBASIC METABOLIC [id number] 15:32:00





             Test Item    Value        Reference Range Interpretation Comments

 

             SODIUM (BEAKER) 137 meq/L    136-145                   



             (test code = 381)                                        

 

             POTASSIUM (BEAKER) 4.5 meq/L    3.5-5.1                   



             (test code = 379)                                        

 

             CHLORIDE (BEAKER) 104 meq/L                        



             (test code = 382)                                        

 

             CO2 (BEAKER) (test 23 meq/L     22-29                     



             code = 355)                                         

 

             BLOOD UREA NITROGEN 21 mg/dL     7-21                      



             (BEAKER) (test code                                        



             = 354)                                              

 

             CREATININE (BEAKER) 0.79 mg/dL   0.57-1.25                 



             (test code = 358)                                        

 

             GLUCOSE RANDOM 145 mg/dL           H            



             (BEAKER) (test code                                        



             = 652)                                              

 

             CALCIUM (BEAKER) 7.8 mg/dL    8.4-10.2     L            



             (test code = 697)                                        

 

             EGFR (BEAKER) (test 102 mL/min/1.73                           ESTIM

ATED GFR IS



             code = 1092) sq m                                   NOT AS ACCURATE

 AS



                                                                 CREATININE



                                                                 CLEARANCE IN



                                                                 PREDICTING



                                                                 GLOMERULAR



                                                                 FILTRATION RATE

.



                                                                 ESTIMATED GFR I

S



                                                                 NOT APPLICABLE 

FOR



                                                                 DIALYSIS PATIEN

TS.



 ID - DBCBC W/PLT COUNT &amp; AUTO [id number] 15:20:00





             Test Item    Value        Reference Range Interpretation Comments

 

             WHITE BLOOD CELL COUNT (BEAKER) 6.5 K/ L     3.5-10.5              

    



             (test code = 775)                                        

 

             RED BLOOD CELL COUNT (BEAKER) 2.10 M/ L    4.63-6.08    L          

  



             (test code = 761)                                        

 

             HEMOGLOBIN (BEAKER) (test code = 7.0 GM/DL    13.7-17.5    L       

     



             410)                                                

 

             HEMATOCRIT (BEAKER) (test code = 22.5 %       40.1-51.0    L       

     



             411)                                                

 

             MEAN CORPUSCULAR VOLUME (BEAKER) 107.1 fL     79.0-92.2    H       

     



             (test code = 753)                                        

 

             MEAN CORPUSCULAR HEMOGLOBIN 33.3 pg      25.7-32.2    H            



             (BEAKER) (test code = 751)                                        

 

             MEAN CORPUSCULAR HEMOGLOBIN CONC 31.1 GM/DL   32.3-36.5    L       

     



             (BEAKER) (test code = 752)                                        

 

             RED CELL DISTRIBUTION WIDTH 14.0 %       11.6-14.4                 



             (BEAKER) (test code = 412)                                        

 

             PLATELET COUNT (BEAKER) (test code 72 K/CU MM   150-450      L     

       



             = 756)                                              

 

             MEAN PLATELET VOLUME (BEAKER) 10.3 fL      9.4-12.4                

  



             (test code = 754)                                        

 

             NUCLEATED RED BLOOD CELLS (BEAKER) 0 /100 WBC   0-0                

       



             (test code = 413)                                        



OXYGEN SATURATION, [id number] 14:56:00





             Test Item    Value        Reference Range Interpretation Comments

 

             O2 SATURATION (MEASURED) (BEAKER) 86.9 %                           

      



             (test code = 1455)                                        



VANCOMYCIN LEVEL, [id number] 13:20:00





             Test Item    Value        Reference Range Interpretation Comments

 

             VANCOMYCIN TROUGH (BEAKER) (test 12.9 ug/mL   10.0-20.0            

     



             code = 522)                                         



 ID - [id number] 13:16:00





             Test Item    Value        Reference Range Interpretation Comments

 

             D-DIMER QUANTITATIVE (BEAKER) 6.87 MG/L FEU <0.50        H         

   



             (test code = 671)                                        



Intended Use: The D-Dimer Assay can be used to aid in the diagnosis of Deep Vein
Thrombosis (DVT) and Pulmonary Embolism Disease (PED).In patients with low pre-
test probability, various studies concerning STA Liatest D-dimer test have 
reported that with a cutoff value of 0.50 MG/L FEU, the Negative Predictive 
Value (NPV) regarding the exclusion of thrombosis is within % range.RAD, 
CHEST, 1 VIEW, NON [id number] 13:09:00Reason for exam:-&gt;cvl 
placementShould this be performed at the bedside?-&gt;Yes
************************************************************[facility]  : 1965 Sex: 
M************************************************************FINAL REPORT 
PATIENT ID: [id number] RAD, CHEST, 1 VIEW, NON DEPT TECHNIQUE: Frontal view(s) of 
the chest. INDICATION: cvl placement. COMPARISON: Chest radiograph 6 hours prior
FINDINGS/IMPRESSION: Lines/Tubes: Endotracheal tube tip approximately 5.8 cm 
above the maryann. Left transjugular catheter tip near the brachiocephalic vein 
junction. Right transjugular catheter tip in the lower SVC. Nasogastric tube 
terminates outside the field-of-view. Lungs/pleura: Complete opacification of 
the left hemithorax. Increasing right basal opacity. Likely small right and 
large left pleural effusions. No pneumothorax. Heart and Mediastinum: Unchanged.
Soft Tissues and Bones: Unchanged. Signed: Dorothea Paz Verified Da
te/Time: 2021 13:09:32 Reading Location: [facility] [facility] 
Reading Room Electronically signed by: DOROTHEA PAZ MD on 2021 01:09
PMBLOOD GAS, [id number] 12:35:00





             Test Item    Value        Reference Range Interpretation Comments

 

             PH ARTERIAL (BEAKER) (test code = 7.33         7.35-7.45    L      

      



             383)                                                

 

             PCO2 ARTERIAL (BEAKER) (test code 48 mm Hg     35-45        H      

      



             = 384)                                              

 

             PO2 ARTERIAL (BEAKER) (test code 185 mm Hg    80-90        H       

     



             = 385)                                              

 

             O2 SATURATION ARTERIAL (BEAKER) 99.2 %       96.0-97.0    H        

    



             (test code = 386)                                        

 

             HCO3 ARTERIAL (BEAKER) (test code 25 mmol/L    21-29               

      



             = 388)                                              

 

             BASE EXCESS ARTERIAL (BEAKER) -1.1 mmol/L  -2.0-3.0                

  



             (test code = 387)                                        

 

             PATIENT TEMPERATURE (BEAKER) 37.6                                  

 



             (test code = 1818)                                        

 

             FIO2 (BEAKER) (test code = 1819) 100.0                             

     



BODY FLUID CULTURE + GRAM [id number] 12:01:00





             Test Item    Value        Reference Range Interpretation Comments

 

             CULTURE (BEAKER) (test code No growth                              



             = 1095)                                             

 

             GRAM STAIN RESULT (BEAKER) 1+ WBCs                                



             (test code = 1123)                                        

 

             GRAM STAIN RESULT (BEAKER) No organisms seen                       

    



             (test code = 57829)                                        



[id number] 10:34:00





             Test Item    Value        Reference Range Interpretation Comments

 

             CORTISOL, TOTAL (BEAKER) (test 23.1 ug/dL   3.7-19.4     H         

   



             code = 2755)                                        



 ID - EDASILACTIC ACID, [id number] 07:51:00





             Test Item    Value        Reference Range Interpretation Comments

 

             LACTATE BLOOD ARTERIAL (2) 0.9 mmol/L   0.5-2.2                   



             (BEAKER) (test code = 2874)                                        



 ID - EDASIOXYGEN SATURATION, [id number] 07:47:00





             Test Item    Value        Reference Range Interpretation Comments

 

             O2 SATURATION (MEASURED) (BEAKER) 90.6 %                           

      



             (test code = 1455)                                        



BLOOD GAS, [id number] 07:45:00





             Test Item    Value        Reference Range Interpretation Comments

 

             PH ARTERIAL (BEAKER) (test code = 7.32         7.35-7.45    L      

      



             383)                                                

 

             PCO2 ARTERIAL (BEAKER) (test code 49 mm Hg     35-45        H      

      



             = 384)                                              

 

             PO2 ARTERIAL (BEAKER) (test code 207 mm Hg    80-90        H       

     



             = 385)                                              

 

             O2 SATURATION ARTERIAL (BEAKER) 99.3 %       96.0-97.0    H        

    



             (test code = 386)                                        

 

             HCO3 ARTERIAL (BEAKER) (test code 24 mmol/L    21-29               

      



             = 388)                                              

 

             BASE EXCESS ARTERIAL (BEAKER) -1.6 mmol/L  -2.0-3.0                

  



             (test code = 387)                                        

 

             PATIENT TEMPERATURE (BEAKER) 37.7                                  

 



             (test code = 1818)                                        

 

             FIO2 (BEAKER) (test code = 1819) 60.0                              

     



HEPATIC FUNCTION [id number] 07:35:00





             Test Item    Value        Reference Range Interpretation Comments

 

             TOTAL PROTEIN (BEAKER) (test code = 5.6 gm/dL    6.0-8.3      L    

        



             770)                                                

 

             ALBUMIN (BEAKER) (test code = 1145) 2.5 g/dL     3.5-5.0      L    

        

 

             BILIRUBIN TOTAL (BEAKER) (test code 0.8 mg/dL    0.2-1.2           

        



             = 377)                                              

 

             BILIRUBIN DIRECT (BEAKER) (test 0.6 mg/dL    0.1-0.5      H        

    



             code = 706)                                         

 

             ALKALINE PHOSPHATASE (BEAKER) (test 168 U/L             H    

        



             code = 346)                                         

 

             AST (SGOT) (BEAKER) (test code = 46 U/L       5-34         H       

     



             353)                                                

 

             ALT (SGPT) (BEAKER) (test code = 112 U/L      6-55         H       

     



             347)                                                



 ID - EDASICBC W/PLT COUNT &amp; AUTO [id number] 07:29:00





             Test Item    Value        Reference Range Interpretation Comments

 

             WHITE BLOOD CELL COUNT (BEAKER) 7.5 K/ L     3.5-10.5              

    



             (test code = 775)                                        

 

             RED BLOOD CELL COUNT (BEAKER) 2.44 M/ L    4.63-6.08    L          

  



             (test code = 761)                                        

 

             HEMOGLOBIN (BEAKER) (test code = 8.2 GM/DL    13.7-17.5    L       

     



             410)                                                

 

             HEMATOCRIT (BEAKER) (test code = 25.8 %       40.1-51.0    L       

     



             411)                                                

 

             MEAN CORPUSCULAR VOLUME (BEAKER) 105.7 fL     79.0-92.2    H       

     



             (test code = 753)                                        

 

             MEAN CORPUSCULAR HEMOGLOBIN 33.6 pg      25.7-32.2    H            



             (BEAKER) (test code = 751)                                        

 

             MEAN CORPUSCULAR HEMOGLOBIN CONC 31.8 GM/DL   32.3-36.5    L       

     



             (BEAKER) (test code = 752)                                        

 

             RED CELL DISTRIBUTION WIDTH 14.0 %       11.6-14.4                 



             (BEAKER) (test code = 412)                                        

 

             PLATELET COUNT (BEAKER) (test code 26 K/CU MM   150-450      L     

       



             = 756)                                              

 

             MEAN PLATELET VOLUME (BEAKER) 10.6 fL      9.4-12.4                

  



             (test code = 754)                                        

 

             NUCLEATED RED BLOOD CELLS (BEAKER) 0 /100 WBC   0-0                

       



             (test code = 413)                                        



(CELLAVISION MANUAL DIFF)2021 07:29:00





             Test Item    Value        Reference Range Interpretation Comments

 

             NEUTROPHILS - REL 73 %                                   



             (CELLAVISION)(BEAKER) (test code =                                 

       



             2816)                                               

 

             LYMPHOCYTES - REL 8 %                                    



             (CELLAVISION)(BEAKER) (test code =                                 

       



             2817)                                               

 

             MONOCYTES - REL 5 %                                    



             (CELLAVISION)(BEAKER) (test code =                                 

       



             2818)                                               

 

             BASOPHILS - REL 1 %                                    



             (CELLAVISION)(BEAKER) (test code =                                 

       



             2820)                                               

 

             MYELOCYTES - REL 1 %          0-0          H            



             (CELLAVISION)(BEAKER) (test code =                                 

       



             2822)                                               

 

             BANDS - REL (CELLAVISION)(BEAKER) 12 %         0-10         H      

      



             (test code = 2826)                                        

 

             NEUTROPHILS - ABS 5.48 K/ul    1.78-5.38    H            



             (CELLAVISION)(BEAKER) (test code =                                 

       



             2830)                                               

 

             LYMPHOCYTES - ABS 0.60 K/ul    1.32-3.57    L            



             (CELLAVISION)(BEAKER) (test code =                                 

       



             2831)                                               

 

             MONOCYTES - ABS 0.38 K/uL    0.30-0.82                 



             (CELLAVISION)(BEAKER) (test code =                                 

       



             2832)                                               

 

             BASOPHILS - ABS 0.08 K/uL    0.01-0.08                 



             (CELLAVISION)(BEAKER) (test code =                                 

       



             2835)                                               

 

             MYELOCYTES-ABS 0.08 K/uL    0.00-0.00    H            



             (CELLAVISION)(BEAKER) (test code =                                 

       



             2837)                                               

 

             BANDS - ABS (CELLAVISION)(BEAKER) 0.90 K/uL    0.00-0.80    H      

      



             (test code = 2840)                                        

 

             TOTAL COUNTED (BEAKER) (test code 100                              

      



             = 1351)                                             

 

             MANUAL NRBC  CELLS (BEAKER) 1 /100 WBC   0-0          H     

       



             (test code = 1353)                                        

 

             RBC MORPHOLOGY (BEAKER) (test code Normal                          

       



             = 762)                                              

 

             PLT MORPHOLOGY (BEAKER) (test code Normal                          

       



             = 486)                                              

 

             DOHLE BODIES (BEAKER) (test code = Present                         

       



             359)                                                

 

             ARTIFACT (CELLAVISION)(BEAKER) Present                             

   



             (test code = 3432)                                        

 

             PLATELET CONCENTRATION Decreased                              



             (CELLAVISION)(BEAKER) (test code =                                 

       



             3438)                                               



 ID - [id number] ID - Margarita OverholtUser comments: Slide comments:RAD, 
CHEST, 1 VIEW, NON [id number] 07:26:00Reason for exam:-&gt;loculated pleural
effusion s/p pigtailShould this be performed at the bedside?-&gt;Yes
********************************************************[facility] : 1965 Sex: 
M************************************************************FINAL REPORT 
PATIENT ID: [id number] EXAMINATION: RAD, CHEST, 1 VIEW, NON DEPT. INDICATION: 
56-year-old male withloculated pleural effusion status post pigtail chest tube 
placement. COMPARISON: Chest radiograph dated 2021. FINDINGS:A nontunneled 
central venous catheter is noted in the right internal jugular vein with tip 
overlying the mid right atrium. An endotracheal tube is noted terminating 
approximately4.8 cm above the maryann. An enteric tube is noted terminating below
the level of the diaphragms. A pigtail chest tube is noted overlying the left 
lower hemothorax. The cardiomediastinal silhouette is slightly deviated to the 
right. Complete opacification of the left hemithorax, progressed from prior ex
amination. No pneumothorax. No acute osseous abnormality. Visualized soft 
tissues are unremarkable. IMPRESSION:1.Lines and tubes as above.2.Complete 
opacification of the left hemithorax, progressed from prior examination. Signed:
Lazarus Donaldson MDReport Verified Date/Time: 2021 07:26:35 Reading Location: 
[address] Body Reading Room Electronically signed by: LAZARUS DONALDSON MD on 2021 07:26 [id number] 04:32:00





             Test Item    Value        Reference Range Interpretation Comments

 

             MAGNESIUM (BEAKER) (test code = 2.2 mg/dL    1.6-2.6               

    



             627)                                                



 ID - [id number] 04:32:00





             Test Item    Value        Reference Range Interpretation Comments

 

             PHOSPHORUS (BEAKER) (test code = 5.0 mg/dL    2.3-4.7      H       

     



             604)                                                



 ID - EDASIBASIC METABOLIC [id number] 04:32:00





             Test Item    Value        Reference Range Interpretation Comments

 

             SODIUM (BEAKER) 134 meq/L    136-145      L            



             (test code = 381)                                        

 

             POTASSIUM (BEAKER) 4.0 meq/L    3.5-5.1                   



             (test code = 379)                                        

 

             CHLORIDE (BEAKER) 100 meq/L                        



             (test code = 382)                                        

 

             CO2 (BEAKER) (test 24 meq/L     22-29                     



             code = 355)                                         

 

             BLOOD UREA NITROGEN 15 mg/dL     7-21                      



             (BEAKER) (test code                                        



             = 354)                                              

 

             CREATININE (BEAKER) 0.78 mg/dL   0.57-1.25                 



             (test code = 358)                                        

 

             GLUCOSE RANDOM 122 mg/dL           H            



             (BEAKER) (test code                                        



             = 652)                                              

 

             CALCIUM (BEAKER) 7.8 mg/dL    8.4-10.2     L            



             (test code = 697)                                        

 

             EGFR (BEAKER) (test 104 mL/min/1.73                           ESTIM

ATED GFR IS



             code = 1092) sq m                                   NOT AS ACCURATE

 AS



                                                                 CREATININE



                                                                 CLEARANCE IN



                                                                 PREDICTING



                                                                 GLOMERULAR



                                                                 FILTRATION RATE

.



                                                                 ESTIMATED GFR I

S



                                                                 NOT APPLICABLE 

FOR



                                                                 DIALYSIS PATIEN

TS.



 ID - EDASICBC (HEMOGRAM ONLY)2021 20:43:00





             Test Item    Value        Reference Range Interpretation Comments

 

             WHITE BLOOD CELL COUNT (BEAKER) 6.3 K/ L     3.5-10.5              

    



             (test code = 775)                                        

 

             RED BLOOD CELL COUNT (BEAKER) 2.20 M/ L    4.63-6.08    L          

  



             (test code = 761)                                        

 

             HEMOGLOBIN (BEAKER) (test code = 7.6 GM/DL    13.7-17.5    L       

     



             410)                                                

 

             HEMATOCRIT (BEAKER) (test code = 22.9 %       40.1-51.0    L       

     



             411)                                                

 

             MEAN CORPUSCULAR VOLUME (BEAKER) 104.1 fL     79.0-92.2    H       

     



             (test code = 753)                                        

 

             MEAN CORPUSCULAR HEMOGLOBIN 34.5 pg      25.7-32.2    H            



             (BEAKER) (test code = 751)                                        

 

             MEAN CORPUSCULAR HEMOGLOBIN CONC 33.2 GM/DL   32.3-36.5            

     



             (BEAKER) (test code = 752)                                        

 

             RED CELL DISTRIBUTION WIDTH 13.9 %       11.6-14.4                 



             (BEAKER) (test code = 412)                                        

 

             PLATELET COUNT (BEAKER) (test code 51 K/CU MM   150-450      L     

       



             = 756)                                              

 

             MEAN PLATELET VOLUME (BEAKER) 10.3 fL      9.4-12.4                

  



             (test code = 754)                                        

 

             NUCLEATED RED BLOOD CELLS (BEAKER) 0 /100 WBC   0-0                

       



             (test code = 413)                                        



BASIC METABOLIC [id number] 20:42:00





             Test Item    Value        Reference Range Interpretation Comments

 

             SODIUM (BEAKER) 136 meq/L    136-145                   



             (test code = 381)                                        

 

             POTASSIUM (BEAKER) 3.9 meq/L    3.5-5.1                   Specimen 

moderately



             (test code = 379)                                        hemolyzed

 

             CHLORIDE (BEAKER) 101 meq/L                        



             (test code = 382)                                        

 

             CO2 (BEAKER) (test 29 meq/L     22-29                     



             code = 355)                                         

 

             BLOOD UREA NITROGEN 10 mg/dL     7-21                      



             (BEAKER) (test code                                        



             = 354)                                              

 

             CREATININE (BEAKER) 0.59 mg/dL   0.57-1.25                 Specimen

 moderately



             (test code = 358)                                        hemolyzed

 

             GLUCOSE RANDOM 119 mg/dL           H            



             (BEAKER) (test code                                        



             = 652)                                              

 

             CALCIUM (BEAKER) 7.6 mg/dL    8.4-10.2     L            



             (test code = 697)                                        

 

             EGFR (BEAKER) (test 143 mL/min/1.73                           ESTIM

ATED GFR IS



             code = 1092) sq m                                   NOT AS ACCURATE

 AS



                                                                 CREATININE



                                                                 CLEARANCE IN



                                                                 PREDICTING



                                                                 GLOMERULAR



                                                                 FILTRATION RATE

.



                                                                 ESTIMATED GFR I

S



                                                                 NOT APPLICABLE 

FOR



                                                                 DIALYSIS PATIEN

TS.



 ID - [id number] 20:41:00





             Test Item    Value        Reference Range Interpretation Comments

 

             MAGNESIUM (BEAKER) 2.2 mg/dL    1.6-2.6                   Specimen 

moderately



             (test code = 627)                                        hemolyzed



 ID - DBHEPATIC FUNCTION [id number] 20:41:00





             Test Item    Value        Reference Range Interpretation Comments

 

             TOTAL PROTEIN (BEAKER) 5.7 gm/dL    6.0-8.3      L            Speci

men moderately



             (test code = 770)                                        hemolyzed

 

             ALBUMIN (BEAKER) (test 2.5 g/dL     3.5-5.0      L            Speci

men moderately



             code = 1145)                                        hemolyzed

 

             BILIRUBIN TOTAL 0.6 mg/dL    0.2-1.2                   Specimen mod

erately



             (BEAKER) (test code =                                        hemoly

zed



             377)                                                

 

             BILIRUBIN DIRECT 0.2 mg/dL    0.1-0.5                   Specimen mo

derately



             (BEAKER) (test code =                                        hemoly

zed



             706)                                                

 

             ALKALINE PHOSPHATASE 186 U/L             H            



             (BEAKER) (test code =                                        



             346)                                                

 

             AST (SGOT) (BEAKER) 80 U/L       5-34         H            Specimen

 moderately



             (test code = 353)                                        hemolyzed

 

             ALT (SGPT) (BEAKER) 142 U/L      6-55         H            Specimen

 moderately



             (test code = 347)                                        hemolyzed



 ID - DBURINALYSIS W/ REFLEX URINE [id number] 18:29:00





             Test Item    Value        Reference Range Interpretation Comments

 

             COLOR (BEAKER) (test code = 470) Yellow                            

     

 

             CLARITY (BEAKER) (test code = 469) Clear                           

       

 

             SPECIFIC GRAVITY UA (BEAKER) (test 1.032        1.001-1.035        

       



             code = 468)                                         

 

             PH UA (BEAKER) (test code = 467) 6.0          5.0-8.0              

     

 

             PROTEIN UA (BEAKER) (test code = 50 mg/dL     Negative     A       

     



             464)                                                

 

             GLUCOSE UA (BEAKER) (test code = Negative     Negative             

     



             365)                                                

 

             KETONES UA (BEAKER) (test code = Negative     Negative             

     



             371)                                                

 

             BILIRUBIN UA (BEAKER) (test code = Negative     Negative           

       



             462)                                                

 

             BLOOD UA (BEAKER) (test code = 461) Large        Negative     A    

        

 

             NITRITE UA (BEAKER) (test code = Negative     Negative             

     



             465)                                                

 

             LEUKOCYTE ESTERASE UA (BEAKER) Negative     Negative               

   



             (test code = 466)                                        

 

             UROBILINOGEN UA (BEAKER) (test code 0.2 mg/dL    0.2-1.0           

        



             = 463)                                              

 

             RBC UA (BEAKER) (test code = 519) 170 /HPF                         

      

 

             WBC UA (BEAKER) (test code = 520) 0 /HPF                           

      

 

             SQUAMOUS EPITHELIAL (BEAKER) (test < /HPF                          

       



             code = 516)                                         

 

             SOURCE(BEAKER) (test code = 2795)                                  

      



 ID - [auto] ID - techOXYGEN SATURATION, [id number] 
18:04:00





             Test Item    Value        Reference Range Interpretation Comments

 

             O2 SATURATION (MEASURED) (BEAKER) 92.8 %                           

      



             (test code = 1455)                                        



BASIC METABOLIC [id number] 13:33:00





             Test Item    Value        Reference Range Interpretation Comments

 

             SODIUM (BEAKER) 134 meq/L    136-145      L            



             (test code = 381)                                        

 

             POTASSIUM (BEAKER) 3.6 meq/L    3.5-5.1                   Specimen 

slightly



             (test code = 379)                                        hemolyzed

 

             CHLORIDE (BEAKER) 99 meq/L                         



             (test code = 382)                                        

 

             CO2 (BEAKER) (test 26 meq/L     22-29                     



             code = 355)                                         

 

             BLOOD UREA NITROGEN 12 mg/dL     7-21                      



             (BEAKER) (test code                                        



             = 354)                                              

 

             CREATININE (BEAKER) 0.65 mg/dL   0.57-1.25                 Specimen

 slightly



             (test code = 358)                                        hemolyzed

 

             GLUCOSE RANDOM 144 mg/dL           H            



             (BEAKER) (test code                                        



             = 652)                                              

 

             CALCIUM (BEAKER) 7.7 mg/dL    8.4-10.2     L            



             (test code = 697)                                        

 

             EGFR (BEAKER) (test 128 mL/min/1.73                           ESTIM

ATED GFR IS



             code = 1092) sq m                                   NOT AS ACCURATE

 AS



                                                                 CREATININE



                                                                 CLEARANCE IN



                                                                 PREDICTING



                                                                 GLOMERULAR



                                                                 FILTRATION RATE

.



                                                                 ESTIMATED GFR I

S



                                                                 NOT APPLICABLE 

FOR



                                                                 DIALYSIS PATIEN

TS.



 ID - [id number] 13:10:00





             Test Item    Value        Reference Range Interpretation Comments

 

             MAGNESIUM (BEAKER) 2.1 mg/dL    1.6-2.6                   Specimen 

slightly



             (test code = 627)                                        hemolyzed



 ID - ROXANNAVANCOMYCIN LEVEL, [id number] 13:08:00





             Test Item    Value        Reference Range Interpretation Comments

 

             VANCOMYCIN TROUGH (BEAKER) (test 9.5 ug/mL    10.0-20.0    L       

     



             code = 522)                                         



 ID - EDASIRAD, CHEST, 1 VIEW, NON [id number] 13:06:00Reason for 
exam:-&gt;post bronchoscopy and reposition ETTShould this be performed at the 
bedside?-&gt;Yes************************************************************[facility] : 1965 Sex: 
M************************************************************FINAL REPORT 
PATIENT ID: [id number] RAD, CHEST, 1 VIEW, NON DEPT TECHNIQUE: Frontal view(s) of 
the chest. INDICATION: post bronchoscopy and reposition ETT. COMPARISON: Chest 
radiograph 2 hours prior FINDINGS/IMPRESSION: Lines/Tubes: Endotracheal tube tip
 5.9 cm above maryann. Nasogastric tube terminates outside the field-of-view 
below the diaphragm. Unchanged right transjugular catheter. The linear tube 
projecting over the left lower thorax is no longer seen. The pigtail catheter 
projecting over the left upper abdomen or left lower thorax is grossly 
unchanged, may be kinked. Lungs/pleura: No convincing change in left parenchymal
 opacities and pleural effusion. No pneumothorax. Heart and Mediastinum: 
Unchanged.Soft Tissues and Bones: Unchanged. Signed: Dorothea Paz 
Verified Date/Time: 2021 13:06:57 Reading Location: [address] 
Consult Reading Room Electronically signed by: DOROTHEA PAZ MD on 
2021 01:06 PMCBC (HEMOGRAM ONLY)2021 12:55:00





             Test Item    Value        Reference Range Interpretation Comments

 

             WHITE BLOOD CELL COUNT (BEAKER) 7.6 K/ L     3.5-10.5              

    



             (test code = 775)                                        

 

             RED BLOOD CELL COUNT (BEAKER) 2.13 M/ L    4.63-6.08    L          

  



             (test code = 761)                                        

 

             HEMOGLOBIN (BEAKER) (test code = 7.3 GM/DL    13.7-17.5    L       

     



             410)                                                

 

             HEMATOCRIT (BEAKER) (test code = 21.8 %       40.1-51.0    L       

     



             411)                                                

 

             MEAN CORPUSCULAR VOLUME (BEAKER) 102.3 fL     79.0-92.2    H       

     



             (test code = 753)                                        

 

             MEAN CORPUSCULAR HEMOGLOBIN 34.3 pg      25.7-32.2    H            



             (BEAKER) (test code = 751)                                        

 

             MEAN CORPUSCULAR HEMOGLOBIN CONC 33.5 GM/DL   32.3-36.5            

     



             (BEAKER) (test code = 752)                                        

 

             RED CELL DISTRIBUTION WIDTH 13.6 %       11.6-14.4                 



             (BEAKER) (test code = 412)                                        

 

             PLATELET COUNT (BEAKER) (test code 42 K/CU MM   150-450      L     

       



             = 756)                                              

 

             MEAN PLATELET VOLUME (BEAKER) 10.8 fL      9.4-12.4                

  



             (test code = 754)                                        

 

             NUCLEATED RED BLOOD CELLS (BEAKER) 0 /100 WBC   0-0                

       



             (test code = 413)                                        



BLOOD GAS, [id number] 12:15:00





             Test Item    Value        Reference Range Interpretation Comments

 

             PH ARTERIAL (BEAKER) (test code = 7.46         7.35-7.45    H      

      



             383)                                                

 

             PCO2 ARTERIAL (BEAKER) (test code 37 mm Hg     35-45               

      



             = 384)                                              

 

             PO2 ARTERIAL (BEAKER) (test code = 194 mm Hg    80-90        H     

       



             385)                                                

 

             O2 SATURATION ARTERIAL (BEAKER) 99.4 %       96.0-97.0    H        

    



             (test code = 386)                                        

 

             HCO3 ARTERIAL (BEAKER) (test code 26 mmol/L    21-29               

      



             = 388)                                              

 

             BASE EXCESS ARTERIAL (BEAKER) 2.3 mmol/L   -2.0-3.0                

  



             (test code = 387)                                        

 

             PATIENT TEMPERATURE (BEAKER) (test 37.4                            

       



             code = 1818)                                        

 

             FIO2 (BEAKER) (test code = 1819) 60.0                              

     



POCT-GLUCOSE [id number] 12:11:00





             Test Item    Value        Reference Range Interpretation Comments

 

             POC-GLUCOSE METER 139 mg/dL           H            : TESTED REBA SHAFER [facility] [address]



             (JOSE G) (test code =                                        [place],



             [address]38)                                               [id number]:



                                                                 /Techni

ayla ID



                                                                 = [id number] for Regis burnett



                                                                 (contract)Dae



RAD, CHEST, 1 VIEW, NON [id number] 11:25:00Reason for exam:-&gt;Lung 
expansionShould this be performed at the bedside?-&gt;Yes
************************************************************[facility] : 1965 Sex: 
M************************************************************FINAL REPORT 
PATIENT ID: [id number] RAD, CHEST, 1 VIEW, NON DEPT, RAD, CHEST, 1 VIEW, NON DEPT 
TECHNIQUE: Frontal view(s) of the chest at 0531 and 1010 hours on 2020. 
INDICATION: Lung expansion. COMPARISON: 2021 chest radiograph 
FINDINGS/IMPRESSION: Lines/Tubes: Endotracheal tube with tip 5.9 cm above the 
maryann, right transjugular catheter with tip at the lower SVC, and nasogastric 
tube with tip in the stomach, and a left-sided chest tube have been placed in 
the interval between the 0531 and 1010 hour films. Lungs/pleura: Partially 
atelectatic left lung with multifocal opacities, no change from 2021. 
Unchanged left pleural effusion. No pneumothorax. Heart and Mediastinum: 
Cardiomegaly. Soft Tissues and Bones: Unchanged. Signed: Dorothea Paz 
Verified Date/Time: 2021 11:25:41 Reading Location: [address] Ortho 
Consult Reading Room Electronically signed by: DOROTHEA PAZ MD on 
2021 11:25 AMRAD, CHEST, 1 VIEW, NON [id number] 11:25:00Reason for 
exam:-&gt;s/p intubation and right IJ CVC placementShould this be performed at 
the bedside?-&gt;Yes
************************************************************[facility] : 1965 Sex: 
M************************************************************FINAL REPORT 
PATIENT ID: [id number] RAD, CHEST, 1 VIEW, NON DEPT, RAD, CHEST, 1 VIEW, NON DEPT 
TECHNIQUE: Frontal view(s) of the chest at 0531 and 1010 hours on 2020. 
INDICATION: Lung expansion. COMPARISON: 2021 chest radiograph 
FINDINGS/IMPRESSION: Lines/Tubes: Endotracheal tube with tip 5.9 cm above the 
maryann, right transjugular catheter with tip at the lower SVC, and nasogastric 
tube with tip in the stomach, and a left-sided chest tube have been placed in 
the interval between the 0531 and 1010 hour films. Lungs/pleura: Partially 
atelectatic left lung with multifocal opacities, no change from 2021. 
Unchanged left pleural effusion. No pneumothorax. Heart and Mediastinum: 
Cardiomegaly. Soft Tissues and Bones: Unchanged. Signed: Dorothea Paz 
Verified Date/Time: 2021 11:25:41 Reading Location: [facility] C013X Ortho 
Consult Reading Room Electronically signed by: DOROTHEA PAZ MD on 
2021 11:25 AMRAD, ABDOMEN/KUB, 1 VIEW [id number] 11:11:00Reason for 
exam:-&gt;NG tube insertionShould this be performed at the bedside?-&gt;Yes
************************************************************[facility] : 1965 Sex: 
M************************************************************FINAL REPORT 
PATIENT ID: [id number] EXAMINATION: RAD, ABDOMEN/KUB, 1 VIEW AP. INDICATION: 
56-year-old male status post nasogastric tube placement. COMPARISON: CTA chest 
abdomen and pelvis dated 4/15/2021. FINDINGS:An enteric tube is noted with tip 
overlying the gastric fundus. Gas is scattered throughout the bowel. The colon 
is mildly dilated, measuring up to 8 cm. No evidence of pneumatosis or portal 
venous gas. No pathologic calcifications. No acute osseous abnormality. A 
catheter overlies the pelvis. IMPRESSION:1.An enteric tube is noted with tip 
overlying the gastric fundus.2.Mild dilatation of the colon. Signed: Lazarus Donaldson MDReport Verified Date/Time: 2021 11:11:36 Reading Location: [address] Body Reading Room Electronically signed by: LAZARUS DONALDSON MD on 
2021 11:11 AMLACTATE DEHYDROGENASE (LDH), BODY [id number] 09:49:00





             Test Item    Value        Reference Range Interpretation Comments

 

             LACTATE DEHYDROGENASE FLUID (BEAKER) 1005 U/L                      

         



             (test code = 634)                                        



Absence of reference range indicates that normals have not been defined.Assay 
performance has not been validated for this type of specimen. ID - 
[id number] METABOLIC [id number] 04:42:00





             Test Item    Value        Reference Range Interpretation Comments

 

             SODIUM (BEAKER) 133 meq/L    136-145      L            



             (test code = 381)                                        

 

             POTASSIUM (BEAKER) 3.5 meq/L    3.5-5.1                   



             (test code = 379)                                        

 

             CHLORIDE (BEAKER) 98 meq/L                         



             (test code = 382)                                        

 

             CO2 (BEAKER) (test 24 meq/L     22-29                     



             code = 355)                                         

 

             BLOOD UREA NITROGEN 12 mg/dL     7-21                      



             (BEAKER) (test code                                        



             = 354)                                              

 

             CREATININE (BEAKER) 0.69 mg/dL   0.57-1.25                 



             (test code = 358)                                        

 

             GLUCOSE RANDOM 150 mg/dL           H            



             (BEAKER) (test code                                        



             = 652)                                              

 

             CALCIUM (BEAKER) 8.1 mg/dL    8.4-10.2     L            



             (test code = 697)                                        

 

             EGFR (BEAKER) (test 119 mL/min/1.73                           ESTIM

ATED GFR IS



             code = 1092) sq m                                   NOT AS ACCURATE

 AS



                                                                 CREATININE



                                                                 CLEARANCE IN



                                                                 PREDICTING



                                                                 GLOMERULAR



                                                                 FILTRATION RATE

.



                                                                 ESTIMATED GFR I

S



                                                                 NOT APPLICABLE 

FOR



                                                                 DIALYSIS PATIEN

TS.



 ID - [id number] 04:42:00





             Test Item    Value        Reference Range Interpretation Comments

 

             MAGNESIUM (BEAKER) (test code = 2.0 mg/dL    1.6-2.6               

    



             627)                                                



 ID - [id number] 04:42:00





             Test Item    Value        Reference Range Interpretation Comments

 

             PHOSPHORUS (BEAKER) (test code = 3.2 mg/dL    2.3-4.7              

     



             604)                                                



 ID - EDASICBC W/PLT COUNT &amp; AUTO [id number] 04:27:00





             Test Item    Value        Reference Range Interpretation Comments

 

             WHITE BLOOD CELL COUNT (BEAKER) 7.0 K/ L     3.5-10.5              

    



             (test code = 775)                                        

 

             RED BLOOD CELL COUNT (BEAKER) 2.52 M/ L    4.63-6.08    L          

  



             (test code = 761)                                        

 

             HEMOGLOBIN (BEAKER) (test code = 8.5 GM/DL    13.7-17.5    L       

     



             410)                                                

 

             HEMATOCRIT (BEAKER) (test code = 25.6 %       40.1-51.0    L       

     



             411)                                                

 

             MEAN CORPUSCULAR VOLUME (BEAKER) 101.6 fL     79.0-92.2    H       

     



             (test code = 753)                                        

 

             MEAN CORPUSCULAR HEMOGLOBIN 33.7 pg      25.7-32.2    H            



             (BEAKER) (test code = 751)                                        

 

             MEAN CORPUSCULAR HEMOGLOBIN CONC 33.2 GM/DL   32.3-36.5            

     



             (BEAKER) (test code = 752)                                        

 

             RED CELL DISTRIBUTION WIDTH 13.4 %       11.6-14.4                 



             (BEAKER) (test code = 412)                                        

 

             PLATELET COUNT (BEAKER) (test code 35 K/CU MM   150-450      L     

       



             = 756)                                              

 

             MEAN PLATELET VOLUME (BEAKER) 10.6 fL      9.4-12.4                

  



             (test code = 754)                                        

 

             NUCLEATED RED BLOOD CELLS (BEAKER) 0 /100 WBC   0-0                

       



             (test code = 413)                                        

 

             NEUTROPHILS RELATIVE PERCENT 65 %                                  

 



             (BEAKER) (test code = 429)                                        

 

             LYMPHOCYTES RELATIVE PERCENT 20 %                                  

 



             (BEAKER) (test code = 430)                                        

 

             MONOCYTES RELATIVE PERCENT 13 %                                   



             (BEAKER) (test code = 431)                                        

 

             EOSINOPHILS RELATIVE PERCENT 0 %                                   

 



             (BEAKER) (test code = 432)                                        

 

             BASOPHILS RELATIVE PERCENT 0 %                                    



             (BEAKER) (test code = 437)                                        

 

             NEUTROPHILS ABSOLUTE COUNT 4.57 K/ L    1.78-5.38                 



             (BEAKER) (test code = 670)                                        

 

             LYMPHOCYTES ABSOLUTE COUNT 1.43 K/ L    1.32-3.57                 



             (BEAKER) (test code = 414)                                        

 

             MONOCYTES ABSOLUTE COUNT (BEAKER) 0.88 K/ L    0.30-0.82    H      

      



             (test code = 415)                                        

 

             EOSINOPHILS ABSOLUTE COUNT 0.01 K/ L    0.04-0.54    L            



             (BEAKER) (test code = 416)                                        

 

             BASOPHILS ABSOLUTE COUNT (BEAKER) 0.01 K/ L    0.01-0.08           

      



             (test code = 417)                                        

 

             IMMATURE GRANULOCYTES-RELATIVE 2 %          0-1          H         

   



             PERCENT (BEAKER) (test code =                                      

  



             2801)                                               



BLOOD GAS, [id number] 04:09:00





             Test Item    Value        Reference Range Interpretation Comments

 

             PH VENOUS (BEAKER) (test code = 7.51         7.32-7.42    H        

    



             701)                                                

 

             PCO2 VENOUS (BEAKER) (test code = 32 mm Hg     41-51        L      

      



             755)                                                

 

             PO2 VENOUS (BEAKER) (test code = 81 mm Hg     25-40        H       

     



             702)                                                

 

             O2 SATURATION VENOUS (BEAKER) 96.9 %       40.0-70.0    H          

  



             (test code = 703)                                        

 

             HCO3 VENOUS (BEAKER) (test code = 25 mmol/L    21-29               

      



             705)                                                

 

             BASE EXCESS VENOUS (BEAKER) (test 2.2 mmol/L   -2.0-3.0            

      



             code = 704)                                         

 

             PATIENT TEMPERATURE (BEAKER) (test 37.0                            

       



             code = 1818)                                        



POCT-GLUCOSE [id number] 18:37:00





             Test Item    Value        Reference Range Interpretation Comments

 

             POC-GLUCOSE METER 146 mg/dL           H            : Notified

 RN/MD:



             (BEAKER) (test code =                                        TESTED

 AT [facility] [address] [address])                                               [facility],



                                                                 [id number]:



                                                                 /Techni

ayla ID



                                                                 = [id number] for Candelario blancas



                                                                 (contract), Sandi shine



[id number] 15:09:00Medical Cytology Report Case: [id number] Authorizing 
Provider: Kirt Vasquez Collected: 04/15/736469:54 PM ELVIA Huitron Ordering 
Location: [address] Received: 2021 08:50 AM Pathologist:Murray Zamora MD  Specimen: Pleural PLEURAL FLUID (LATERALITY NOT SPECIFIED) 
(CYTOSPINS):- NEGATIVE FOR MALIGNANCY Signing Pathologist Direct Phone Line: 
[phone] signed by Murray Zamora MD on 2021 at
 3:09 [id number] effusion, empyemaPLEURAL FLUID (LATERALITY NOT SPECIFIED)10
 mls yellow fluid; 4 cytospinsPerformed. [facility], Department of [facility]ology, [address] [place] [address], Tel 
[phone], [facility] of [facility], [address] [place] [address], Tel [phone] [facility], Department of Pathology, [address] [place] [address], Tel 
[phone] FLUID CELL COUNT WITH [id number] 14:06:00





             Test Item    Value        Reference Range Interpretation Comments

 

             APPEARANCE FLUID Clear        Clear                     



             (BEAKER) (test code =                                        



             510)                                                

 

             COLOR FLUID (BEAKER) Yellow       Colorless, Straw A            



             (test code = 511)                                        

 

             RBC FLUID (BEAKER) 427 /cu mm   See_Comment  H             [Automat

ed message]



             (test code = 513)                                        The system

 which



                                                                 generated this 

result



                                                                 transmitted ref

erence



                                                                 range: <=1. The



                                                                 reference range

 was



                                                                 not used to int

erpret



                                                                 this result as



                                                                 normal/abnormal

.

 

             ADJUSTED WBC FLUID 14 /cu mm    See_Comment  H             [Automat

ed message]



             (BEAKER) (test code =                                        The sy

stem which



             0332)                                               generated this 

result



                                                                 transmitted ref

erence



                                                                 range: <=5. The



                                                                 reference range

 was



                                                                 not used to int

erpret



                                                                 this result as



                                                                 normal/abnormal

.

 

             LINING CELLS (BEAKER) 0 /cu mm     See_Comment                [Auto

mated message]



             (test code = 1590)                                        The syste

m which



                                                                 generated this 

result



                                                                 transmitted ref

erence



                                                                 range: <=1. The



                                                                 reference range

 was



                                                                 not used to int

erpret



                                                                 this result as



                                                                 normal/abnormal

.

 

             NEUTROPHILS FLUID 94 %                                   



             (BEAKER) (test code =                                        



             1656)                                               

 

             LYMPHS FLUID (BEAKER) 6 %                                    



             (test code = 488)                                        

 

             MONO/MACROPHAGE FLUID 0 %                                    



             (BEAKER) (test code =                                        



             489)                                                

 

             EOSINOPHILS FLUID 0 %                                    



             (BEAKER) (test code =                                        



             491)                                                

 

             BASO FLUID (BEAKER) 0 %                                    



             (test code = 492)                                        

 

             CONTAINER BODY FLUID EDTA Tube                              



             (BEAKER) (test code =                                        



             2873)                                               



POCT-GLUCOSE [id number] 12:46:00





             Test Item    Value        Reference Range Interpretation Comments

 

             POC-GLUCOSE METER 147 mg/dL           H            : Notified

 RN/MD:



             (BEAKER) (test code =                                        TESTED

 AT [facility] [id number])                                               [facility],



                                                                 [address]:



                                                                 /Techni

ayla ID



                                                                 = [id number] for ARLEEN RIVERA



TSH/FREE T4 IF [id number] 10:25:00





             Test Item    Value        Reference Range Interpretation Comments

 

             THYROID STIMULATING HORMONE 1.646 uIU/mL 0.350-4.940               



             (BEAKER) (test code = 772)                                        



 ID - BRUNA GUTIERREZ, CHEST, 1 VIEW, NON [id number] 09:41:00Reason for 
exam:-&gt;lung expansionShould this be performed at the bedside?-&gt;Yes
********************************************************[facility] : 1965 Sex: 
M************************************************************FINAL REPORT 
PATIENT ID: [id number] CLINICAL HISTORY: lung expansion TECHNIQUE: 1 view of the 
chest. COMPARISON:4/15/2021 IMPRESSION: A left lower pigtail catheter is again 
seen. Diffuse left hemithorax pleural-parenchymal opacity is grossly unchanged. 
Blunting of the right costophrenic angle is noted. The cardiomediastinal 
silhouette is magnified by technique. Signed: Adriana Hitchcock MDReport Verified 
Date/Time:2021 09:41:57 Reading Location: [facility] Radiology Reading 
Room Electronically signed by:ADRIANA HITCHCOCK M.D. on 2021 09:41 AM
PROTEIN, BODY [id number] 09:18:00





             Test Item    Value        Reference Range Interpretation Comments

 

             PROTEIN FLUID (BEAKER) (test code = 5.0 g/dL                       

        



             579)                                                



Absence of reference range indicates that normals have not been defined.Assay 
performance has not been validated for this type of specimen. ID - 
[id number] 06:20:00





             Test Item    Value        Reference Range Interpretation Comments

 

             FERRITIN (BEAKER) (test code = 1703.28 ng/mL 5..00  H        

    



             361)                                                



 ID - EDASIFOLATE, [id number] 06:20:00





             Test Item    Value        Reference Range Interpretation Comments

 

             FOLATE (BEAKER) 6.10 ng/mL   See_Comment  L             [Automated 

message] The



             (test code = 362)                                        system [facility] generated



                                                                 this result tra

nsmitted



                                                                 reference range

: >=7.00.



                                                                 The reference r

carla was



                                                                 not used to int

erpret



                                                                 this result as



                                                                 normal/abnormal

.



 ID - EDASIPOCT-GLUCOSE [id number] 05:50:00





             Test Item    Value        Reference Range Interpretation Comments

 

             POC-GLUCOSE METER 121 mg/dL           H            : TESTED A

T [facility] [address]



             (BEAKER) (test code =                                        [place],



             [address]38)                                               [address]:



                                                                 /Techni

ayla ID



                                                                 = [id number] for Ad

ams,



                                                                 Kimetra



VITAMIN [id number] 04:34:00





             Test Item    Value        Reference Range Interpretation Comments

 

             VITAMIN B12 (BEAKER) (test code = 526 pg/mL    213-816             

      



             774)                                                



 ID - EDASIHEPATITIS C [id number] 04:24:00





             Test Item    Value        Reference Range Interpretation Comments

 

             HEPATITIS C ANTIBODY (BEAKER) Nonreactive  Nonreactive             

  



             (test code = 367)                                        



 ID - DBHIV-1 ANTIGEN WITH HIV-1/2 [id number] 04:24:00





             Test Item    Value        Reference Range Interpretation Comments

 

             HIV-1 ANTIGEN WITH HIV 1\T\2 Nonreactive  Nonreactive              

 



             ANTIBODY (2) (BEAKER) (test code                                   

     



             = 2586)                                             



 ID - DBVANCOMYCIN LEVEL, [id number] 04:22:00





             Test Item    Value        Reference Range Interpretation Comments

 

             VANCOMYCIN TROUGH (BEAKER) (test 3.7 ug/mL    10.0-20.0    L       

     



             code = 522)                                         



 ID - DBBASIC METABOLIC [id number] 04:08:00





             Test Item    Value        Reference Range Interpretation Comments

 

             SODIUM (BEAKER) 136 meq/L    136-145                   



             (test code = 381)                                        

 

             POTASSIUM (BEAKER) 3.7 meq/L    3.5-5.1                   



             (test code = 379)                                        

 

             CHLORIDE (BEAKER) 99 meq/L                         



             (test code = 382)                                        

 

             CO2 (BEAKER) (test 27 meq/L     22-29                     



             code = 355)                                         

 

             BLOOD UREA NITROGEN 12 mg/dL     7-21                      



             (BEAKER) (test code                                        



             = 354)                                              

 

             CREATININE (BEAKER) 0.65 mg/dL   0.57-1.25                 



             (test code = 358)                                        

 

             GLUCOSE RANDOM 142 mg/dL           H            



             (BEAKER) (test code                                        



             = 652)                                              

 

             CALCIUM (BEAKER) 8.2 mg/dL    8.4-10.2     L            



             (test code = 697)                                        

 

             EGFR (BEAKER) (test 128 mL/min/1.73                           ESTIM

ATED GFR IS



             code = 1092) sq m                                   NOT AS ACCURATE

 AS



                                                                 CREATININE



                                                                 CLEARANCE IN



                                                                 PREDICTING



                                                                 GLOMERULAR



                                                                 FILTRATION RATE

.



                                                                 ESTIMATED GFR I

S



                                                                 NOT APPLICABLE 

FOR



                                                                 DIALYSIS PATIEN

TS.



 ID - [id number] 04:08:00





             Test Item    Value        Reference Range Interpretation Comments

 

             MAGNESIUM (BEAKER) (test code = 2.2 mg/dL    1.6-2.6               

    



             627)                                                



 ID - [id number] 04:08:00





             Test Item    Value        Reference Range Interpretation Comments

 

             PHOSPHORUS (BEAKER) (test code = 2.9 mg/dL    2.3-4.7              

     



             604)                                                



 ID - EDASIIRON, TIBC, % SAT. (WITHOUT FERRITIN)2021 04:03:00





             Test Item    Value        Reference Range Interpretation Comments

 

             IRON (BEAKER) (test code = 547) 28.0 ug/dL   40.0-160.0   L        

    

 

             TOTAL IRON BINDING CAPACITY 185 ug/dL    250-450      L            



             (BEAKER) (test code = 769)                                        

 

             IRON % SATURATION (2) (BEAKER) 15 %         20-55        L         

   



             (test code = 2590)                                        



 ID - DBCBC W/PLT COUNT &amp; AUTO [id number] 03:54:00





             Test Item    Value        Reference Range Interpretation Comments

 

             WHITE BLOOD CELL COUNT (BEAKER) 5.6 K/ L     3.5-10.5              

    



             (test code = 775)                                        

 

             RED BLOOD CELL COUNT (BEAKER) 2.42 M/ L    4.63-6.08    L          

  



             (test code = 761)                                        

 

             HEMOGLOBIN (BEAKER) (test code = 8.2 GM/DL    13.7-17.5    L       

     



             410)                                                

 

             HEMATOCRIT (BEAKER) (test code = 24.9 %       40.1-51.0    L       

     



             411)                                                

 

             MEAN CORPUSCULAR VOLUME (BEAKER) 102.9 fL     79.0-92.2    H       

     



             (test code = 753)                                        

 

             MEAN CORPUSCULAR HEMOGLOBIN 33.9 pg      25.7-32.2    H            



             (BEAKER) (test code = 751)                                        

 

             MEAN CORPUSCULAR HEMOGLOBIN CONC 32.9 GM/DL   32.3-36.5            

     



             (BEAKER) (test code = 752)                                        

 

             RED CELL DISTRIBUTION WIDTH 13.7 %       11.6-14.4                 



             (BEAKER) (test code = 412)                                        

 

             PLATELET COUNT (BEAKER) (test code 51 K/CU MM   150-450      L     

       



             = 756)                                              

 

             MEAN PLATELET VOLUME (BEAKER) 9.7 fL       9.4-12.4                

  



             (test code = 754)                                        

 

             NUCLEATED RED BLOOD CELLS (BEAKER) 0 /100 WBC   0-0                

       



             (test code = 413)                                        

 

             NEUTROPHILS RELATIVE PERCENT 66 %                                  

 



             (BEAKER) (test code = 429)                                        

 

             LYMPHOCYTES RELATIVE PERCENT 21 %                                  

 



             (BEAKER) (test code = 430)                                        

 

             MONOCYTES RELATIVE PERCENT 11 %                                   



             (BEAKER) (test code = 431)                                        

 

             EOSINOPHILS RELATIVE PERCENT 1 %                                   

 



             (BEAKER) (test code = 432)                                        

 

             BASOPHILS RELATIVE PERCENT 0 %                                    



             (BEAKER) (test code = 437)                                        

 

             NEUTROPHILS ABSOLUTE COUNT 3.71 K/ L    1.78-5.38                 



             (BEAKER) (test code = 670)                                        

 

             LYMPHOCYTES ABSOLUTE COUNT 1.16 K/ L    1.32-3.57    L            



             (BEAKER) (test code = 414)                                        

 

             MONOCYTES ABSOLUTE COUNT (BEAKER) 0.64 K/ L    0.30-0.82           

      



             (test code = 415)                                        

 

             EOSINOPHILS ABSOLUTE COUNT 0.03 K/ L    0.04-0.54    L            



             (BEAKER) (test code = 416)                                        

 

             BASOPHILS ABSOLUTE COUNT (BEAKER) 0.01 K/ L    0.01-0.08           

      



             (test code = 417)                                        

 

             IMMATURE GRANULOCYTES-RELATIVE 1 %          0-1                    

   



             PERCENT (BEAKER) (test code =                                      

  



             2801)                                               



SARS-COV2/RT-PCR ([facility])2021 03:49:00





             Test Item    Value        Reference Range Interpretation Comments

 

             SARS-COV2/RT-PCR (test Negative     Not Detected, Negative,        

      



             code = 2672472)              See external report for              



                                       linked test               

 

             SARS-COV-2 PERFORMING LAB [facility]                             



             (test code = 7717610)                                        



Negative result for this test determines that SARS-CoV-2 RNA was not present in 
the specimen above the Limit of Detection (LOD). However, Negative results do 
not preclude SARS-CoV-2 infection and should not be used as the sole basis for 
treatment or patient management decisions. Negative results must be combined 
with clinical observations, patient history, and epidemiological information. A 
false negative result may occur if a specimen is improperly collected, 
transported or handled. A false negative result should be considered if 
patient's recent exposures or clinical presentation indicate that COVID-19 
(SARS-CoV-2) is likely and diagnostic tests for other causes of illness are 
negative. Re-testing should be considered in cases of suspected false 
negatives.The limit of detection for this assay is 100 copies/mL.This SARS CoV-2
test is a real-time RT-PCR test intended for the qualitative detection of 
nucleic acid from SARS-CoV-2 in a nasopharyngeal swab specimen collected from 
individuals suspected of COVID-19 by their healthcare provider.This test has not
been Food and Drug Administration (FDA) cleared or approved. This is a modified 
version of an approved Emergency Use Authorization (EUA) and is in the process 
of review by the FDA. Once authorized by the FDA, the issued EUA will be 
effective until the declaration that circumstances exist justifying the 
authorization of the emergency use ofin vitro diagnostic tests for detection 
and/or diagnosis of COVID-19 is terminated under Section 564(b)(2) of the Act or
the EUA is revoked under Section 564(g) of the Act.Testing was performed using 
the Abbott SARS-CoV-2 assay.Fact Sheet for Healthcare 
Providers:https://www.BetUknow.abbott/sal/RT_SAR
N-CxV-1_RIK_Lxwl_Fnrrc_41-757518.pdfFact Sheet for Healthcare 
Patients:https://www.BetUknow.abbott/s
al/CT_QVKQ-TmJ-3_Ufdgsbj_Snli_Nkebo_[place]_85-924286F8.pdfPerforming 
Laboratory:[place] [address] [place] [address]
POCT-GLUCOSE [id number] 00:05:00





             Test Item    Value        Reference Range Interpretation Comments

 

             POC-GLUCOSE METER 146 mg/dL           H            : TESTED A

T [facility] [address]



             (BEAKER) (test code =                                        [place]

[place] [facility],



             [address])                                               [address]:



                                                                 /Techni

ayla ID



                                                                 = [id number] for Yuniel Reddy ams



RETICULOCYTE [id number] 23:42:00





             Test Item    Value        Reference Range Interpretation Comments

 

             RETICULOCYTE COUNT PCT (BEAKER) (test 1.5 %        0.5-1.8         

          



             code = 575)                                         



 ID - 6000HEPATIC FUNCTION PANEL2021-04-15 18:18:00





             Test Item    Value        Reference Range Interpretation Comments

 

             TOTAL PROTEIN (BEAKER) (test code = 6.1 gm/dL    6.0-8.3           

        



             770)                                                

 

             ALBUMIN (BEAKER) (test code = 1145) 2.9 g/dL     3.5-5.0      L    

        

 

             BILIRUBIN TOTAL (BEAKER) (test code 0.6 mg/dL    0.2-1.2           

        



             = 377)                                              

 

             BILIRUBIN DIRECT (BEAKER) (test 0.4 mg/dL    0.1-0.5               

    



             code = 706)                                         

 

             ALKALINE PHOSPHATASE (BEAKER) (test 113 U/L                  

        



             code = 346)                                         

 

             AST (SGOT) (BEAKER) (test code = 23 U/L       5-34                 

     



             353)                                                

 

             ALT (SGPT) (BEAKER) (test code = 24 U/L       6-55                 

     



             347)                                                



 ID - DBLACTATE DEHYDROGENASE (LDH)2021-04-15 18:18:00





             Test Item    Value        Reference Range Interpretation Comments

 

             LACTATE DEHYDROGENASE (BEAKER) (test 139 U/L      125-220          

         



             code = 635)                                         



 ID - [id number] 18:15:00





             Test Item    Value        Reference Range Interpretation Comments

 

             GLUCOSE RANDOM (BEAKER) (test code 164 mg/dL           H     

       



             = 652)                                              



 ID - ADMINLACTATE DEHYDROGENASE (LDH)2021-04-15 18:15:00





             Test Item    Value        Reference Range Interpretation Comments

 

             LACTATE DEHYDROGENASE (BEAKER) (test 140 U/L      125-220          

         



             code = 635)                                         



 ID - ADMINAMYLASE, BODY FLUID2021-04-15 18:10:00





             Test Item    Value        Reference Range Interpretation Comments

 

             AMYLASE FLUID (BEAKER) (test code = 22 U/L                         

        



             350)                                                



Absence of reference range indicates that normals have not been defined.Assay 
performance has not been validated for this type of specimen.CBC (HEMOGRAM ONLY)
2021-04-15 17:59:00





             Test Item    Value        Reference Range Interpretation Comments

 

             WHITE BLOOD CELL COUNT (BEAKER) 6.4 K/ L     3.5-10.5              

    



             (test code = 775)                                        

 

             RED BLOOD CELL COUNT (BEAKER) 2.46 M/ L    4.63-6.08    L          

  



             (test code = 761)                                        

 

             HEMOGLOBIN (BEAKER) (test code = 8.4 GM/DL    13.7-17.5    L       

     



             410)                                                

 

             HEMATOCRIT (BEAKER) (test code = 25.2 %       40.1-51.0    L       

     



             411)                                                

 

             MEAN CORPUSCULAR VOLUME (BEAKER) 102.4 fL     79.0-92.2    H       

     



             (test code = 753)                                        

 

             MEAN CORPUSCULAR HEMOGLOBIN 34.1 pg      25.7-32.2    H            



             (BEAKER) (test code = 751)                                        

 

             MEAN CORPUSCULAR HEMOGLOBIN CONC 33.3 GM/DL   32.3-36.5            

     



             (BEAKER) (test code = 752)                                        

 

             RED CELL DISTRIBUTION WIDTH 14.0 %       11.6-14.4                 



             (BEAKER) (test code = 412)                                        

 

             PLATELET COUNT (BEAKER) (test code 56 K/CU MM   150-450      L     

       



             = 756)                                              

 

             MEAN PLATELET VOLUME (BEAKER) 9.6 fL       9.4-12.4                

  



             (test code = 754)                                        

 

             NUCLEATED RED BLOOD CELLS (BEAKER) 0 /100 WBC   0-0                

       



             (test code = 413)                                        



POCT-GLUCOSE [id number] 17:56:00





             Test Item    Value        Reference Range Interpretation Comments

 

             POC-GLUCOSE METER 163 mg/dL           H            : TESTED A

T [facility] [address]



             (BEAKER) (test code =                                        [place] [facility],



             [address])                                               [address]:



                                                                 /Techni

ayla ID



                                                                 = [id number] for Pauly Madden



HIGH SENSITIVITY TROPONIN -04-15 16:15:00





             Test Item    Value        Reference Range Interpretation Comments

 

             HIGH SENSITIVITY 5 pg/ml      See_Comment                [Automated

 message]



             TROPONIN I (test code =                                        The 

system which



             3428204)                                            generated this 

result



                                                                 transmitted ref

erence



                                                                 range: <=35. Th

e



                                                                 reference range

 was not



                                                                 used to interpr

et this



                                                                 result as



                                                                 normal/abnormal

.



 ID - DBThe  STAT High Sensitivity Troponin-I results should be
used in conjunctionwith other diagnostic information such as ECG, clinical 
observations and information, and patient symptoms to aid in the diagnosis of 
MI.RAD, CHEST, 1 VIEW, NON DEPT2021-04-15 16:11:00Reason for exam:-&gt;s/p left 
pleural drainageShould this be performed at the bedside?-&gt;Yes
************************************************************[facility] : 1965 Sex: 
M************************************************************FINAL REPORT 
PATIENT ID: [id number] CLINICAL HISTORY: s/p left pleural drainage TECHNIQUE: 1 
view of the chest. COMPARISON: 2021 IMPRESSION: There is a new left basilar
pigtail catheter. Diffuse left hemithorax pleural-parenchymal opacity is overall
grossly unchanged. The right lung remains free of infiltrates or effusions. The 
cardiac mediastinal silhouette is unchanged. Signed: Adriana Hitchcock MDReport 
Verified Date/Time: 04/15/2021 16:11:00 Reading Location: [facility] 
Radiology Reading Room Electronically signed by: ADRIANA HITCHCOCK M.D. on 
04/15/2021 04:11 PMU/S, DRAINAGE, CHEST, WITH TUBE [id number] 15:01:00
Reason for exam:-&gt;Loculated L effusion
************************************************************[facility] : 1965 Sex: 
M************************************************************FINAL REPORT 
PATIENT ID: [id number] Ultrasound-guided left pleural drainage History: Loculated 
left pleural effusion Sedation: None. : Ric Kamara MD. 
Assistant: None. Approach: Left posterior chest wall, percutaneous Estimated 
blood loss: &lt; 5 cc. Specimen: 40 cc of serous appearing fluid initially 
aspirated, samples left at the bedside. Technique: Informed written consent was 
obtained. Discussion of risks, benefits, and alternatives were made with the 
patient. The patient expressed understanding and agreed to proceed. A universal 
timeout was performed prior to starting the procedure. All elements maximal 
sterile barrier technique was utilized for this procedure, including utilization
of sterile scrub solution for skin prep, a large sterile sheet to cover the 
areas of the patient that were not prepped, and hand hygiene, mask, head 
covering, and sterile gown for performing radiologist and scrub technologist. 
Initial ultrasound evaluation demonstrates loculated appearing left pleural effu
jenny. 2% lidocaine was used for local anesthesia. Using ultrasound guidance, 
following acquisition of permanent images, a 5 French one-step catheter was 
advanced into the left pleural space. There is aspiration of serous appearing 
fluid. Subsequently, a 0.035 inch wire was advanced through the catheter and 
coiled within the left pleural space. The tract was dilated and a 8 French 
drainage catheter was advanced over wire with pigtail formed within the left 
pleural space. The catheter spaces skin withsilk suture and attached to an 
atrium. A sterile dressing was applied. The patient tolerated the procedure 
without immediate complication. Impression: Successful ultrasound-guided left 
pleural drainagewith placement of 8 French catheter and initial aspiration of 
serous appearing fluid. Signed: Ric Kamaraeport Verified Date/Time: 
04/15/2021 15:01:45 Reading Location: [place] [address] Angio Body Reading Room 
Electronically signed by: RIC KAMARA MD on 04/15/2021 03:01 PMPERIPHERAL 
BLOOD SMEAR - HOLD ONLY2021-04-15 14:25:00





             Test Item    Value        Reference Range Interpretation Comments

 

             PERIPHERAL SMEAR SAVE ([facility]) (test save                          

         



             code = 1815)                                        



POCT-GLUCOSE [id number] 11:41:00





             Test Item    Value        Reference Range Interpretation Comments

 

             POC-GLUCOSE METER 119 mg/dL           H            : TESTED A

T [facility] [address]



             ([facility]) (test code =                                        [address] [facility],



             [address]38)                                               [id number]:



                                                                 /Techni

ayla ID



                                                                 = [id number] for Pauly Madden



HIGH SENSITIVITY TROPONIN -04-15 10:47:00





             Test Item    Value        Reference Range Interpretation Comments

 

             HIGH SENSITIVITY 4 pg/ml      See_Comment                [Automated

 message]



             TROPONIN I (test code =                                        The 

system which



             7660322)                                            generated this 

result



                                                                 transmitted ref

erence



                                                                 range: <=35. Th

e



                                                                 reference range

 was not



                                                                 used to interpr

et this



                                                                 result as



                                                                 normal/abnormal

.



 ID - PIAYA LThe  STAT High Sensitivity Troponin-I results 
should be used in conjunction with other diagnostic information such as ECG, 
clinical observations and information, and patient symptoms to aid in the 
diagnosis of MI.CBC (HEMOGRAM ONLY)2021-04-15 10:26:00





             Test Item    Value        Reference Range Interpretation Comments

 

             WHITE BLOOD CELL COUNT 7.9 K/ L     3.5-10.5                  



             (AKER) (test code = 775)                                        

 

             RED BLOOD CELL COUNT 2.53 M/ L    4.63-6.08    L            



             (BEAKER) (test code = 761)                                        

 

             HEMOGLOBIN (BEAKER) (test 8.7 GM/DL    13.7-17.5    L            



             code = 410)                                         

 

             HEMATOCRIT (BEAKER) (test 25.8 %       40.1-51.0    L            



             code = 411)                                         

 

             MEAN CORPUSCULAR VOLUME 102.0 fL     79.0-92.2    H            



             (BEAKER) (test code = 753)                                        

 

             MEAN CORPUSCULAR 34.4 pg      25.7-32.2    H            



             HEMOGLOBIN (BEAKER) (test                                        



             code = 751)                                         

 

             MEAN CORPUSCULAR 33.7 GM/DL   32.3-36.5                 



             HEMOGLOBIN CONC (BEAKER)                                        



             (test code = 752)                                        

 

             RED CELL DISTRIBUTION 14.1 %       11.6-14.4                 



             WIDTH (BEAKER) (test code                                        



             = 412)                                              

 

             PLATELET COUNT (BEAKER) 67 K/CU MM   150-450      L            POST

 TRANSFUSION



             (test code = 756)                                        

 

             MEAN PLATELET VOLUME 9.6 fL       9.4-12.4                  



             (BEAKER) (test code = 754)                                        

 

             NUCLEATED RED BLOOD CELLS 0 /100 WBC   0-0                       



             (BEAKER) (test code = 413)                                        



CBC W/PLT COUNT &amp; AUTO DIFFERENTIAL2021-04-15 09:36:00





             Test Item    Value        Reference Range Interpretation Comments

 

             WHITE BLOOD CELL COUNT (BEAKER) 8.1 K/ L     3.5-10.5              

    



             (test code = 775)                                        

 

             RED BLOOD CELL COUNT (BEAKER) 2.83 M/ L    4.63-6.08    L          

  



             (test code = 761)                                        

 

             HEMOGLOBIN (BEAKER) (test code = 9.5 GM/DL    13.7-17.5    L       

     



             410)                                                

 

             HEMATOCRIT (BEAKER) (test code = 29.4 %       40.1-51.0    L       

     



             411)                                                

 

             MEAN CORPUSCULAR VOLUME (BEAKER) 103.9 fL     79.0-92.2    H       

     



             (test code = 753)                                        

 

             MEAN CORPUSCULAR HEMOGLOBIN 33.6 pg      25.7-32.2    H            



             (BEAKER) (test code = 751)                                        

 

             MEAN CORPUSCULAR HEMOGLOBIN CONC 32.3 GM/DL   32.3-36.5            

     



             (BEAKER) (test code = 752)                                        

 

             RED CELL DISTRIBUTION WIDTH 14.1 %       11.6-14.4                 



             (BEAKER) (test code = 412)                                        

 

             PLATELET COUNT (BEAKER) (test code 18 K/CU MM   150-450      L     

       



             = 756)                                              

 

             MEAN PLATELET VOLUME (BEAKER) 10.4 fL      9.4-12.4                

  



             (test code = 754)                                        

 

             NUCLEATED RED BLOOD CELLS (BEAKER) 0 /100 WBC   0-0                

       



             (test code = 413)                                        



(MANUAL DIFFERENTIAL)2021-04-15 09:36:00





             Test Item    Value        Reference Range Interpretation Comments

 

             NEUTROPHILS - REL (DIFF) (BEAKER) 65 %                             

      



             (test code = 1359)                                        

 

             LYMPHOCYTES - REL (DIFF) (BEAKER) 16 %                             

      



             (test code = 1360)                                        

 

             MONOCYTES - REL (DIFF) (BEAKER) 13 %                               

    



             (test code = 1361)                                        

 

             EOSINOPHILS - REL (DIFF) (BEAKER) 0 %                              

      



             (test code = 1362)                                        

 

             BASOPHILS - REL (DIFF) (BEAKER) 1 %                                

    



             (test code = 1363)                                        

 

             BANDS - REL (DIFF) (BEAKER) (test 5 %          0-10                

      



             code = 1348)                                        

 

             NEUTROPHILS - ABS (DIFF) (BEAKER) 5.27 K/ L    1.80-8.00           

      



             (test code = 1365)                                        

 

             LYMPHOCYTES - ABS (DIFF) (BEAKER) 1.30 K/ L    1.48-4.50    L      

      



             (test code = 1366)                                        

 

             MONOCYTES - ABS (DIFF) (BEAKER) 1.05 K/ L    0.00-1.30             

    



             (test code = 1367)                                        

 

             EOSINOPHILS - ABS (DIFF) (BEAKER) 0.00 K/ L    0.00-0.50           

      



             (test code = 1368)                                        

 

             BASOPHILS - ABS (DIFF) (BEAKER) 0.08 K/ L    0.00-0.20             

    



             (test code = 1369)                                        

 

             BANDS-ABS (DIFF) (BEAKER) (test 0.4 K/ L     0.0-0.8               

    



             code = 1349)                                        

 

             TOTAL COUNTED (BEAKER) (test code = 100                            

        



             1351)                                               

 

             BANDS + SEGMENTED NEUTROPHILS 5.67                                 

  



             (BEAKER) (test code = 1352)                                        

 

             WBC MORPHOLOGY (BEAKER) (test code Normal                          

       



             = 487)                                              

 

             PLT MORPHOLOGY (BEAKER) (test code Normal                          

       



             = 486)                                              

 

             RBC MORPHOLOGY (BEAKER) (test code Normal                          

       



             = 762)                                              



BASIC METABOLIC PANEL2021-04-15 06:38:00





             Test Item    Value        Reference Range Interpretation Comments

 

             SODIUM (BEAKER) 135 meq/L    136-145      L            



             (test code = 381)                                        

 

             POTASSIUM (BEAKER) 4.0 meq/L    3.5-5.1                   



             (test code = 379)                                        

 

             CHLORIDE (BEAKER) 103 meq/L                        



             (test code = 382)                                        

 

             CO2 (BEAKER) (test 20 meq/L     22-29        L            



             code = 355)                                         

 

             BLOOD UREA NITROGEN 16 mg/dL     7-21                      



             (BEAKER) (test code                                        



             = 354)                                              

 

             CREATININE (BEAKER) 0.71 mg/dL   0.57-1.25                 



             (test code = 358)                                        

 

             GLUCOSE RANDOM 136 mg/dL           H            



             (BEAKER) (test code                                        



             = 652)                                              

 

             CALCIUM (BEAKER) 8.3 mg/dL    8.4-10.2     L            



             (test code = 697)                                        

 

             EGFR (BEAKER) (test 116 mL/min/1.73                           ESTIM

ATED GFR IS



             code = 1092) sq m                                   NOT AS ACCURATE

 AS



                                                                 CREATININE



                                                                 CLEARANCE IN



                                                                 PREDICTING



                                                                 GLOMERULAR



                                                                 FILTRATION RATE

.



                                                                 ESTIMATED GFR I

S



                                                                 NOT APPLICABLE 

FOR



                                                                 DIALYSIS PATIEN

TS.



 ID - RUTHANN MMAGNESIUM2021-04-15 06:38:00





             Test Item    Value        Reference Range Interpretation Comments

 

             MAGNESIUM (BEAKER) (test code = 2.1 mg/dL    1.6-2.6               

    



             627)                                                



 ID - RUTHANN MPHOSPHORUS2021-04-15 06:38:00





             Test Item    Value        Reference Range Interpretation Comments

 

             PHOSPHORUS (BEAKER) (test code = 4.3 mg/dL    2.3-4.7              

     



             604)                                                



 ID - RUTHANN MPOCT-GLUCOSE [id number] 05:37:00





             Test Item    Value        Reference Range Interpretation Comments

 

             POC-GLUCOSE METER 135 mg/dL           H            : TESTED A

T [facility] [address]



             (BEAKER) (test code =                                        [facility],



             [address])                                               [id number]:



                                                                 /Techni

ayla ID



                                                                 = [id number] for Ad

ams,



                                                                 Kimetra



HIGH SENSITIVITY TROPONIN -04-15 04:34:00





             Test Item    Value        Reference Range Interpretation Comments

 

             HIGH SENSITIVITY < pg/ml      See_Comment                [Automated

 message]



             TROPONIN I (test code =                                        The 

system which



             [id number])                                            generated this 

result



                                                                 transmitted ref

erence



                                                                 range: <=35. Th

e



                                                                 reference range

 was not



                                                                 used to interpr

et this



                                                                 result as



                                                                 normal/abnormal

.



 ID - RUTHANN [facility]  STAT High Sensitivity Troponin-I results 
should be used in conjunction with other diagnostic information such as ECG, 
clinical observations and information, and patient symptoms to aid in the 
diagnosis of MI.LACTIC ACID, ARTERIAL2021-04-15 04:17:00





             Test Item    Value        Reference Range Interpretation Comments

 

             LACTATE BLOOD ARTERIAL (2) 0.8 mmol/L   0.5-2.2                   



             (BEAKER) (test code = 2874)                                        



 ID - RUTHANN MPROTHROMBIN TIME/INR2021-04-15 04:16:00





             Test Item    Value        Reference Range Interpretation Comments

 

             PROTIME (BEAKER) 15.7 seconds 11.9-14.2    H            



             (test code = 759)                                        

 

             INR (BEAKER) (test 1.30         See_Comment                [Automat

ed message]



             code = 370)                                         The system [employer]



                                                                 generated this 

result



                                                                 transmitted ref

erence



                                                                 range: <=5.90. 

The



                                                                 reference range

 was



                                                                 not used to int

erpret



                                                                 this result as



                                                                 normal/abnormal

.



Effective 2019: PT Reference Range ChangeNew: 11.9-14.2 Previous: 11.7-
14.7RECOMMENDED COUMADIN/WARFARIN INR THERAPY RANGESSTANDARD DOSE: 2.0-3.0 
Includes: PROPHYLAXIS for venous thrombosis, systemic embolization; TREATMENT 
for venous thrombosis and/or pulmonary embolus.HIGH RISK: Target INR is 2.5-3.5 
for patients wiht mechanical heart valves.BLOOD GAS, ARTERIAL2021-04-15 03:55:00





             Test Item    Value        Reference Range Interpretation Comments

 

             PH ARTERIAL (BEAKER) (test code = 7.39         7.35-7.45           

      



             383)                                                

 

             PCO2 ARTERIAL (BEAKER) (test code 39 mm Hg     35-45               

      



             = 384)                                              

 

             PO2 ARTERIAL (BEAKER) (test code 137 mm Hg    80-90        H       

     



             = 385)                                              

 

             O2 SATURATION ARTERIAL (BEAKER) 98.7 %       96.0-97.0    H        

    



             (test code = 386)                                        

 

             HCO3 ARTERIAL (BEAKER) (test code 23 mmol/L    21-29               

      



             = 388)                                              

 

             BASE EXCESS ARTERIAL (BEAKER) -1.6 mmol/L  -2.0-3.0                

  



             (test code = 387)                                        

 

             PATIENT TEMPERATURE (BEAKER) 37.0                                  

 



             (test code = 1818)                                        

 

             FIO2 (BEAKER) (test code = 1819) 40.0                              

     



CTA, CHEST, ABDOMEN - PELVIS, FOR DISSECTION2021-04-15 01:35:00Unlisted Reason 
for Exam - Click Yes and Enter Reason Below-&gt;Yesleft sided chest pain, 
possible dissection seen on echoUnlisted Reason for Exam-&gt;left sided chest 
pain, concern for dissection seenon echo at bedside
************************************************************[facility] : 1965 Sex: 
M************************************************************FINAL REPORT 
PATIENT ID: [id number] CLINICAL HISTORY: Left sided chest pain, questionable 
aortic dissection seenon bedside echo FINDINGS: Multiple axial images of the 
chest, abdomen and pelvis were performed before and after the uncomplicated 
administration of IV contrast utilizing a CTA protocol. Coronal and sagittal 
reformats were created. 3-D imaging on an independent workstation was also 
performed for optimal visualization of the arterial system in accordance with 
the aortogram protocol. Oral contrast was was not given. This exam was performed
according to our departmental dose-optimization program, whichincludes automated
exposure control, adjustment of the mA and/or kV according to patient size 
and/oruse of the iterative reconstruction technique. Comparison: None. Vascular:
There is no aortic dissection or aneurysm. No periaortic hematoma is present. 
There is no major branch vessel occlusion. Aortic root: 3.5 cmMid ascending 
aorta: 3.4 cmAnterior arch: 3.2 cmPosterior arch: 2.7 cmDescending aorta at left
atrium: 2.7 cmDescending aorta at diaphragmatic hiatus: 2.3 cmAbdominal aorta at
the celiac origin: 2.3 cmAbdominal aorta at the SMA origin: 2.1 cmAbdominal 
aorta at the left renal artery origin: 2.1 cmAbdominal aorta proximal to the 
bifurcation: 1.4 cmLeft common iliac artery: 1.0 cmRight common iliac artery: 
0.8 cm The coronary arteries originate as expected and appear grossly patent. 
The great vessels are normal in distribution and appearance. The celiac axis and
SMA are patent. The right hepatic artery arises from the SMA. There are 2 right 
renal arteries and a single left renal artery or its The LEONARDO, aortic and iliac 
bifurcations are patent. Chest: Lung parenchyma and pleural spaces: There is a 
moderate to large, loculated left pleural effusion, simple in density. There is 
adjacent compressive atelectasis in the left lung. There are paraseptal 
emphysematous changes in the lung apices. No pneumothorax. 7 mm noncalcified 
nodule in the lingula. Tracheobronchial tree: No significant findings. Pulmonary
vasculature: No significant findings. Cardiac contours and great vessels: No 
significant findings. Mediastinum: No significant findings. Lymph Nodes: 
Prominent lymph nodes in the mediastinum and left hilum. A prevascular 
mediastinal node has a short axis diameter of 1.0 cm. A left hilar lymph node 
measures 1.5 cm. Skeleton: No acute abnormality. Abdomen and pelvis: Liver: No 
significant findings. Gallbladder and biliary tree: No significant findings. 
Spleen: No significant findings. Adrenal Glands: No significant findings. 
Kidneys and ureters: No significant findings. Stomach and Duodenum: No 
significant findings. Pancreas: No significant findings. Bowel: No significant 
findings. Appendix: Normal. Bladder: The urinary bladder is distended with the 
bladder dome above the level of the umbilicus. There are several bladder 
diverticula. Reproductive organs: No significant findings. Other: No free air, 
fluid or adenopathy Skeleton: Age indeterminate, mild compression deformity of 
the superior endplate of L1. IMPRESSION: No aortic dissection, aneurysm, 
periaortic hematoma or major branch vessel occlusion. Moderate to large, 
loculated left pleural effusion with subjacent atelectasis versus pneumonitis. 
Prominent mediastinal and left hilar lymph nodes may be reactive. Primary 
lymphoproliferative or metastatic malignancy should be excluded on follow-up. 
Distended urinary bladder with multiple diverticula suggesting chronic outlet 
obstruction/lateral dysfunction. Urologic evaluation can be obtained, as 
indicated. 7 mm lingular nodule. Please see below for published recommendations 
for follow-up. 2017 Fleischner Society Recommendations for Single Solid Lung 
Nodule Follow-Up based on size (average of long- and short-axis diameters) &lt;6
mm Low-Risk Patient: No routine follow-up &lt;6 mm High-Risk Patient: Optional 
CT at 12 months 6-8 mm Low-Risk Patient: CT at 6-12 months then [address] CT at 
18-24 months6-8 mm High-Risk Patient: CT at 6-12 [address] then CT at 18-24 months 
&gt;8 mm Low-Risk Patient: Consider CT, PET/CT or tissue sampling at 3 
months&gt;8 mm High-Risk Patient: Same as for low-risk patient Signed: Dhruv Werner MDReport Verified Date/Time: 04/15/2021 01:35:53 Electronically signed by: 
DHRUV WERNER M.D. on 04/15/2021 01:35 AMTHROMBOELASTOGRAPH (TEG)2021-04-15 
01:21:00





             Test Item    Value        Reference Range Interpretation Comments

 

             TEG ACTIVATED CLOTTING TIME 4.8 minutes  4.0-7.0                   



             (BEAKER) (test code = 1407)                                        

 

             TEG FIBRINOGEN ACTIVITY (BEAKER) 72.4 degrees 61.0-73.0            

     



             (test code = 1408)                                        

 

             TEG PLT. AGGREGATION (BEAKER) 45.9 MM      55.0-65.0    L          

  



             (test code = 1409)                                        

 

             TEG FIBRINOLYSIS (BEAKER) (test 0.0 %        0.0-5.0               

    



             code = 1410)                                        

 

             TGH ACTIVATED CLOTTING TIME 5.8 minutes  4.0-7.0                   



             (BEAKER) (test code = 1411)                                        

 

             TGH FIBRINOGEN ACTIVITY (BEAKER) 68.9 degrees 61.0-73.0            

     



             (test code = 1412)                                        

 

             TGH PLT. AGGREGATION (BEAKER) 49.2 MM      55.0-65.0    L          

  



             (test code = 1413)                                        

 

             TGH FIBRINOLYSIS (BEAKER) (test 0.0 %        0.0-5.0               

    



             code = 1414)                                        



RAD, CHEST, 1 VIEW, NON DEPT2021-04-15 00:09:00Reason for exam:-&gt;new admit: 
pleural effusion dx OSH, respiratory insuffciencyShould this be performed at the
bedside?-&gt;Yes************************************************************[facility] : 1965 Sex: 
M************************************************************FINAL REPORT 
PATIENT ID: [id number] History: Respiratory insufficiency, pleural effusion 
diagnosed at outside facility. Comparison: None. Findings: 2 frontal images of 
the chest are submitted. The examination is limited by low lung volumes. There 
is a moderate to large left pleural collection. There is patchy opacification of
the left mid and lower lung, which may reflect atelectasis. Pneumonitis should 
be excluded clinically. An underlying pulmonary lesion could be obscured. The 
cardiac silhouette is partiallyobscured by opacification of the left mid and 
lower lung. There is ill-defined prominence of the left superior mediastinum for
which a perihilar mass, adenopathy, vascular ectasia or loculated effusionare 
considerations. There is no acute bony abnormality. Further evaluation with CT 
chest is recommended. Signed: Dhruv Wernereport Verified Date/Time: 
04/15/2021 00:09:55 Electronically signed by:DHRUV WERNER M.D. on 04/15/2021 
12:09 [id number] 23:32:00





             Test Item    Value        Reference Range Interpretation Comments

 

             FIBRINOGEN LEVEL (BEAKER) (test 871 mg/dl    225-434      H        

    



             code = 658)                                         



[id number] 23:32:00





             Test Item    Value        Reference Range Interpretation Comments

 

             PROTIME (BEAKER) (test 15.1 seconds 11.9-14.2    H            



             code = 759)                                         

 

             INR (BEAKER) (test 1.22         See_Comment                [Automat

ed



             code = 370)                                         message] The sy

stem



                                                                 which generated



                                                                 this result



                                                                 transmitted



                                                                 reference range

:



                                                                 <=5.90. The



                                                                 reference range

 was



                                                                 not used to



                                                                 interpret this



                                                                 result as



                                                                 normal/abnormal

.

 

             PARTIAL THROMBOPLASTIN 34.0 seconds 22.5-36.0                 



             TIME (BEAKER) (test                                        



             code = 760)                                         



Effective 2019: PT Reference Range ChangeNew: 11.9-14.2 Previous: 11.7-
14.7RECOMMENDED COUMADIN/WARFARIN INR THERAPY RANGESSTANDARD DOSE: 2.0-3.0 
Includes: PROPHYLAXIS for venous thrombosis, systemic embolization; TREATMENT 
for venous thrombosis and/or pulmonary embolus.HIGH RISK: Target INR is 2.5-3.5 
for patients wiht mechanical heart valves.HIGH SENSITIVITY TROPONIN -02-75 
23:26:00





             Test Item    Value        Reference Range Interpretation Comments

 

             HIGH SENSITIVITY 5 pg/ml      See_Comment                [Automated

 message]



             TROPONIN I (test code =                                        The 

system which



             [id number])                                            generated this 

result



                                                                 transmitted ref

erence



                                                                 range: <=35. Th

e



                                                                 reference range

 was not



                                                                 used to interpr

et this



                                                                 result as



                                                                 normal/abnormal

.



 ID - PIAYA LThe  STAT High Sensitivity Troponin-I results 
should be used in conjunction with other diagnostic information such as ECG, 
clinical observations and information, and patient symptoms to aid in the 
diagnosis of MI.LACTIC ACID, [id number] 23:24:00





             Test Item    Value        Reference Range Interpretation Comments

 

             LACTATE BLOOD VENOUS 1.02 mmol/L  0.50-2.20                 Specime

n slightly



             (2) (BEAKER) (test                                        hemolyzed



             code = 2872)                                        



 ID - [id number] 23:20:00





             Test Item    Value        Reference Range Interpretation Comments

 

             MAGNESIUM (BEAKER) 2.1 mg/dL    1.6-2.6                   Specimen 

slightly



             (test code = 627)                                        hemolyzed



 ID - [id number] 23:20:00





             Test Item    Value        Reference Range Interpretation Comments

 

             PHOSPHORUS (BEAKER) 4.0 mg/dL    2.3-4.7                   Specimen

 slightly



             (test code = 604)                                        hemolyzed



 ID - BSBASIC METABOLIC [id number] 23:20:00





             Test Item    Value        Reference Range Interpretation Comments

 

             SODIUM (BEAKER) 136 meq/L    136-145                   



             (test code = 381)                                        

 

             POTASSIUM (BEAKER) 4.3 meq/L    3.5-5.1                   Specimen 

slightly



             (test code = 379)                                        hemolyzed

 

             CHLORIDE (BEAKER) 103 meq/L                        



             (test code = 382)                                        

 

             CO2 (BEAKER) (test 21 meq/L     22-29        L            



             code = 355)                                         

 

             BLOOD UREA NITROGEN 16 mg/dL     7-21                      



             (BEAKER) (test code                                        



             = 354)                                              

 

             CREATININE (BEAKER) 0.72 mg/dL   0.57-1.25                 Specimen

 slightly



             (test code = 358)                                        hemolyzed

 

             GLUCOSE RANDOM 131 mg/dL           H            



             (BEAKER) (test code                                        



             = 652)                                              

 

             CALCIUM (BEAKER) 8.6 mg/dL    8.4-10.2                  



             (test code = 697)                                        

 

             EGFR (BEAKER) (test 114 mL/min/1.73                           ESTIM

ATED GFR IS



             code = 1092) sq m                                   NOT AS ACCURATE

 AS



                                                                 CREATININE



                                                                 CLEARANCE IN



                                                                 PREDICTING



                                                                 GLOMERULAR



                                                                 FILTRATION RATE

.



                                                                 ESTIMATED GFR I

S



                                                                 NOT APPLICABLE 

FOR



                                                                 DIALYSIS PATIEN

TS.



 ID - BSCALCIUM, [id number] 23:05:00





             Test Item    Value        Reference Range Interpretation Comments

 

             CALCIUM IONIZED (BEAKER) (test 1.07 mmol/L  1.12-1.27    L         

   



             code = 698)                                         

 

             PH, BLOOD (BEAKER) (test code = 7.40                               

    



             1810)                                               



CBC W/PLT COUNT &amp; AUTO [id number] 22:59:00





             Test Item    Value        Reference Range Interpretation Comments

 

             WHITE BLOOD CELL COUNT (BEAKER) 8.7 K/ L     3.5-10.5              

    



             (test code = 775)                                        

 

             RED BLOOD CELL COUNT (BEAKER) 3.00 M/ L    4.63-6.08    L          

  



             (test code = 761)                                        

 

             HEMOGLOBIN (BEAKER) (test code = 10.3 GM/DL   13.7-17.5    L       

     



             410)                                                

 

             HEMATOCRIT (BEAKER) (test code = 30.7 %       40.1-51.0    L       

     



             411)                                                

 

             MEAN CORPUSCULAR VOLUME (BEAKER) 102.3 fL     79.0-92.2    H       

     



             (test code = 753)                                        

 

             MEAN CORPUSCULAR HEMOGLOBIN 34.3 pg      25.7-32.2    H            



             (BEAKER) (test code = 751)                                        

 

             MEAN CORPUSCULAR HEMOGLOBIN CONC 33.6 GM/DL   32.3-36.5            

     



             (BEAKER) (test code = 752)                                        

 

             RED CELL DISTRIBUTION WIDTH 14.2 %       11.6-14.4                 



             (BEAKER) (test code = 412)                                        

 

             PLATELET COUNT (BEAKER) (test code 27 K/CU MM   150-450      L     

       



             = 756)                                              

 

             MEAN PLATELET VOLUME (BEAKER) 12.0 fL      9.4-12.4                

  



             (test code = 754)                                        

 

             NUCLEATED RED BLOOD CELLS (BEAKER) 0 /100 WBC   0-0                

       



             (test code = 413)                                        

 

             NEUTROPHILS RELATIVE PERCENT 67 %                                  

 



             (BEAKER) (test code = 429)                                        

 

             LYMPHOCYTES RELATIVE PERCENT 19 %                                  

 



             (BEAKER) (test code = 430)                                        

 

             MONOCYTES RELATIVE PERCENT 13 %                                   



             (BEAKER) (test code = 431)                                        

 

             EOSINOPHILS RELATIVE PERCENT 0 %                                   

 



             (BEAKER) (test code = 432)                                        

 

             BASOPHILS RELATIVE PERCENT 0 %                                    



             (BEAKER) (test code = 437)                                        

 

             NEUTROPHILS ABSOLUTE COUNT 5.84 K/ L    1.78-5.38    H            



             (BEAKER) (test code = 670)                                        

 

             LYMPHOCYTES ABSOLUTE COUNT 1.66 K/ L    1.32-3.57                 



             (BEAKER) (test code = 414)                                        

 

             MONOCYTES ABSOLUTE COUNT (BEAKER) 1.11 K/ L    0.30-0.82    H      

      



             (test code = 415)                                        

 

             EOSINOPHILS ABSOLUTE COUNT 0.01 K/ L    0.04-0.54    L            



             (BEAKER) (test code = 416)                                        

 

             BASOPHILS ABSOLUTE COUNT (BEAKER) 0.01 K/ L    0.01-0.08           

      



             (test code = 417)                                        

 

             IMMATURE GRANULOCYTES-RELATIVE 1 %          0-1                    

   



             PERCENT (BEAKER) (test code =                                      

  



             2806)

## 2023-01-11 NOTE — EDPHYS
Physician Documentation                                                                           

 [facility]                                                                 

Name: Corby Cotter                                                                               

Age: 57 yrs                                                                                       

Sex: Male                                                                                         

: 1965                                                                                   

MRN: [id number]                                                                                   

Arrival Date: 2023                                                                          

Time: 08:35                                                                                       

Account#: [id number]                                                                            

Bed 2                                                                                             

Private MD:                                                                                       

ED Physician Masoud Sheets                                                                         

HPI:                                                                                              

                                                                                             

09:09 This 57 yrs old  Male presents to ER via Ambulatory with complaints of          sp3 

      Irregular Pulse - 150s.                                                                     

09:09 57-year-old male with no prior cardiac history, history of thrombocytopenia of unknown  sp3 

      etiology with baseline platelets in the 18-25 range, history of prior MRSA, recent          

      otitis media status post antibiotics of unknown exact name, now presents to the ED with     

      chief complaint palpitations and tachycardia. Patient was seeing his hematologist on        

      routine visit yesterday when they noticed his tachycardia of rate 140 and advised him       

      to come to the ED for evaluation. However he was late in the day and he went home to        

      see how he progressed to the night. This morning he woke up with a heart rate of 150        

      and so elected to come in to the ED via private vehicle. He has no symptoms other than      

      occasional palpitation. He denies chest pain, epigastric pain, back pain, shortness of      

      breath, left arm pain, jaw pain, or any other anginal equivalents. On review of systems     

      he also denies headache, neck pain, abdominal pain, nausea, vomiting, diarrhea, lower       

      back pain, rash, syncope, near syncope, neurological symptoms, focal neurodeficit,          

      travel history, known sick contacts, fever, URI symptoms, or any other symptoms at this     

      time..                                                                                      

                                                                                                  

Historical:                                                                                       

- Allergies:                                                                                      

08:47 No Known Allergies;                                                                     aa5 

- Home Meds:                                                                                      

08:47 Flomax 0.4 mg Oral cap [Active];                                                        aa5 

- PMHx:                                                                                           

08:47 MRSA;                                                                                   aa5 

                                                                                                  

- Immunization history:: Adult Immunizations unknown.                                             

- Social history:: Smoking status: Patient denies any tobacco usage or history of.                

                                                                                                  

                                                                                                  

ROS:                                                                                              

09:11 Constitutional: Negative for fever, chills, and weight loss, Eyes: Negative for injury, sp3 

      pain, redness, and discharge, ENT: Negative for injury, pain, and discharge, Neck:          

      Negative for injury, pain, and swelling, Respiratory: Negative for shortness of breath,     

      cough, wheezing, and pleuritic chest pain, Abdomen/GI: Negative for abdominal pain,         

      nausea, vomiting, diarrhea, and constipation, Back: Negative for injury and pain,           

      MS/Extremity: Negative for injury and deformity, Skin: Negative for injury, rash, and       

      discoloration, Neuro: Negative for headache, weakness, numbness, tingling, and seizure,     

      Psych: Negative for depression, anxiety, suicide ideation, homicidal ideation, and          

      hallucinations, Allergy/Immunology: Negative for hives, rash, and allergies, Endocrine:     

      Negative for neck swelling, polydipsia, polyuria, polyphagia, and marked weight             

      changes, Hematologic/Lymphatic: Negative for swollen nodes, abnormal bleeding, and          

      unusual bruising.                                                                           

09:11 All other systems are negative.                                                             

                                                                                                  

Exam:                                                                                             

09:11 Constitutional:  This is a well developed, well nourished patient who is awake, alert,  sp3 

      and in no acute distress. Head/Face:  Normocephalic, atraumatic. Eyes:  Pupils equal        

      round and reactive to light, extra-ocular motions intact.  Lids and lashes normal.          

      Conjunctiva and sclera are non-icteric and not injected.  Cornea within normal limits.      

      Periorbital areas with no swelling, redness, or edema. Neck:  Trachea midline, no           

      thyromegaly or masses palpated, and no cervical lymphadenopathy.  Supple, full range of     

      motion without nuchal rigidity, or vertebral point tenderness.  No Meningismus.             

      Chest/axilla:  Normal chest wall appearance and motion.  Nontender with no deformity.       

      No lesions are appreciated. Respiratory:  Lungs have equal breath sounds bilaterally,       

      clear to auscultation and percussion.  No rales, rhonchi or wheezes noted.  No              

      increased work of breathing, no retractions or nasal flaring. Abdomen/GI:  Soft,            

      non-tender, with normal bowel sounds.  No distension or tympany.  No guarding or            

      rebound.  No evidence of tenderness throughout. Back:  No spinal tenderness.  No            

      costovertebral tenderness.  Full range of motion. Skin:  Warm, dry with normal turgor.      

      Normal color with no rashes, no lesions, and no evidence of cellulitis. MS/ Extremity:      

      Pulses equal, no cyanosis.  Neurovascular intact.  Full, normal range of motion. Neuro:     

       Awake and alert, GCS 15, oriented to person, place, time, and situation.  Cranial          

      nerves II-XII grossly intact.  Motor strength 5/5 in all extremities.  Sensory grossly      

      intact.  Cerebellar exam normal.  Normal gait. Psych:  Awake, alert, with orientation       

      to person, place and time.  Behavior, mood, and affect are within normal limits.            

09:11 Cardiovascular: Rate: tachycardic.                                                          

                                                                                                  

Vital Signs:                                                                                      

08:35  / 67; Pulse 151; Resp 18 S; Temp 98.0(TE); Pulse Ox 98% on R/A; Weight 115.21 kg aa5 

      (R); Height 6 ft. 3 in. (190.50 cm) (R);                                                    

08:58  / 67; Pulse 148; Resp 12; Pulse Ox 96% on R/A;                                   ld1 

09:11 Pulse 148;                                                                              ld1 

09:11 Pulse 148;                                                                              ld1 

09:11 BP 90 / 73; Pulse 150; Resp 12; Pulse Ox 94% on R/A; Pain 0/10;                         ld1 

09:22 Pulse 117;                                                                              ld1 

09:27 BP 90 / 73; Pulse 117; Resp 11; Pulse Ox 94% on R/A; Pain 0/10;                         ld1 

09:30  / 70; Pulse 121; Resp 13; Pulse Ox 95% on R/A;                                   ld1 

10:00 BP 92 / 65; Pulse 140; Resp 12; Pulse Ox 97% on R/A;                                    ld1 

10:34 BP 92 / 65; Pulse 141; Resp 12; Pulse Ox 96% on R/A;                                    ld1 

10:48 BP 87 / 67; Pulse 140;                                                                  ld1 

11:20  / 61; Pulse 144; Resp 19; Pulse Ox 96% on R/A; Pain 0/10;                        ld1 

12:06 BP 91 / 75; Pulse 149; Resp 15; Pulse Ox 97% on R/A; Pain 0/10;                         ld1 

12:19 BP 90 / 69; Pulse 151; Resp 14; Pulse Ox 99% on R/A;                                    ld1 

12:30 BP 99 / 81; Pulse 149;                                                                  ld1 

13:00  / 75; Pulse 149;                                                                 vg1 

13:07 BP 88 / 75; Pulse 147; Resp 18; Pulse Ox 96% on R/A;                                    ld1 

13:26 BP 83 / 57; Pulse 147; Resp 15; Pulse Ox 97% on R/A;                                    ld1 

14:22  / 75; Pulse 148; Resp 18; Pulse Ox 98% on R/A; Pain 0/10;                        ld1 

14:56 BP 81 / 57; Pulse 147; Resp 29; Pulse Ox 98% on R/A; Pain 0/10;                         ld1 

15:05 BP 99 / 67; Pulse 149; Resp 18; Temp 97.9(O); Pulse Ox 99% on R/A; Pain 0/10;           ld1 

08:35 Body Mass Index 31.75 (115.21 kg, 190.50 cm)                                            aa5 

                                                                                                  

MDM:                                                                                              

08:53 Patient medically screened.                                                             sp3 

09:12 Data reviewed: vital signs, nurses notes, lab test result(s), EKG, radiologic studies.  sp3 

      ED course: 57-year-old male with history above now with palpitations and heart rate of      

      150. She was placed immediately into room and placed on the cardiac monitor which           

      confirmed heart rate and SVT rhythm. EKG demonstrates supraventricular tachycardia of       

      rate 150 with normal axis, and obscured ST/T-segment's given heart rate. Patient was        

      given 12 mg of adenosine while pacer pads from patient. Defibrillator rhythm strip          

      demonstrates atrial flutter during ventricular pause during adenosine administration.       

      Patient immediately returned to SVT rate of 150 after adenosine administration. Patient     

      was then given Cardizem 10 mg IV and subsequent second dose of Cardizem 10 mg IV            

      patient is now slowed heart rate 110-120 still in atrial flutter but now rate               

      controlled. Given the length of his symptoms, we will prophylactically anticoagulate        

      with Lovenox and now admit patient for cardiology evaluation and further work-up..          

09:22 ED course: EKG #2 demonstrates atrial flutter with irregular rate of 113 with normal    sp3 

      axis, and obscured ST/T-segment's given baseline flutter. Please also see rhythm strip      

      from defibrillator during adenosine administration process for further rhythm               

      visualization..                                                                             

11:25 ED course: Patient's heart rate is now back up to 150 and given he has had 3 doses of   sp3 

      Cardizem, will place him on a Cardizem drip since it does rate control periodically on      

      the as needed doses. I discussed with [facility] who now requires him to be in the ICU given       

      the fact that he is on a drip. We also gave protamine 50 mg given the fact that his         

      hemoglobin came back at 10 and he got a full dose of Lovenox. Patient shows no signs of     

      bleeding and blood pressure is normal. There is no hematology coverage here and this        

      has been communicated to transfer center as well..                                          

11:55 ED course: Discussed with Dr. Nj (Cardiology/CCU) at [facility] CCU unit who   sp3 

      graciously accepted this patient. They did recommend anticoagulation despite low            

      platelets however I will defer that administration to them. Pt will be transported on       

      cardizem gtt..                                                                              

                                                                                                  

                                                                                             

08:54 Order name: Basic Metabolic Panel; Complete Time: 09:30                                 sp3 

                                                                                             

08:54 Order name: CBC with Diff; Complete Time: 10:12                                         sp3 

                                                                                             

08:54 Order name: LFT's; Complete Time: 09:30                                                 sp3 

                                                                                             

08:54 Order name: Magnesium; Complete Time: 09:30                                             sp3 

                                                                                             

08:54 Order name: NT PRO-BNP; Complete Time: 09:30                                            sp3 

                                                                                             

08:54 Order name: PT-INR; Complete Time: 09:30                                                sp3 

                                                                                             

08:54 Order name: Troponin HS; Complete Time: 09:30                                           sp3 

                                                                                             

08:54 Order name: XRAY Chest (1 view); Complete Time: 09:30                                   sp3 

                                                                                             

09:33 Order name: SARS RAPID; Complete Time: 10:12                                            ld1 

                                                                                             

09:51 Order name: CBC Smear Scan; Complete Time: 10:12                                        EDMS

                                                                                             

11:06 Order name: Urine Dipstick-Ancillary; Complete Time: 11:19                              EDMS

                                                                                             

08:54 Order name: EKG; Complete Time: 08:55                                                   sp3 

                                                                                             

08:54 Order name: Cardiac monitoring; Complete Time: 08:56                                    sp3 

                                                                                             

08:54 Order name: EKG - Nurse/Tech; Complete Time: 08:56                                      sp3 

                                                                                             

08:54 Order name: IV Saline Lock; Complete Time: 08:56                                        sp3 

                                                                                             

08:54 Order name: Labs collected and sent; Complete Time: 08:56                               sp3 

                                                                                             

08:54 Order name: O2 Per Protocol; Complete Time: 08:56                                       sp3 

                                                                                             

08:54 Order name: O2 Sat Monitoring; Complete Time: 08:56                                     sp3 

                                                                                                  

Administered Medications:                                                                         

08:51 Drug: Adenosine 12 mg Route: IVP; Site: left antecubital;                               ld1 

09:11 Follow up: Pulse 148 bpm; Response: No adverse reaction                                 ld1 

08:54 Drug: Cardizem (diltiazem) 10 mg Route: IVP; Site: left antecubital;                    ld1 

09:11 Follow up: Pulse 148 bpm; Response: No adverse reaction                                 ld1 

09:11 Drug: Cardizem (diltiazem) 10 mg Route: IVP; Site: left antecubital;                    ld1 

09:22 Follow up: Pulse 117 bpm; Response: No adverse reaction                                 ld1 

09:37 Drug: Lovenox (enoxaparin) 120 mg Route: Sub-Q; Site: abdomen;                          ld1 

10:48 Follow up: Response: No adverse reaction                                                ld1 

10:24 Drug: Cardizem (diltiazem) 10 mg Route: IVP; Site: left antecubital;                    vg1 

10:48 Follow up: BP 87 / 67; Pulse 140 bpm; Response: No adverse reaction                     ld1 

10:26 Drug: misc 50 mg Route: IV; Rate: calculated rate; Site: left antecubital;              vg1 

10:51 Follow up: IV Status: Completed infusion; IV Intake: 55ml                               vg1 

12:03 Drug: Cardizem (diltiazem) 5 mg/hr Route: IV; Rate: calculated rate; Site: left         ld1 

      antecubital;                                                                                

12:30 Follow up: BP 99 / 81; Pulse 149 bpm; Response: No adverse reaction; Rate change 5      ld1 

      mg/hr; IV Status: Infusion continued                                                        

13:00 Follow up:  / 75; Pulse 149 bpm; Rate change 10 mg/hr; IV Status: Infusion        vg1 

      continued                                                                                   

12:30 Drug: NS 0.9% 1000 ml Route: IV; Rate: 1 bolus; Site: left antecubital;                 ld1 

                                                                                                  

                                                                                                  

Disposition Summary:                                                                              

23 11:58                                                                                    

Transfer Ordered                                                                                  

      Transfer Location: [facility]                                sp3 

      Reason: Higher level of care                                                            sp3 

      Condition: Stable(23 11:58)                                                       sp3 

      Problem: new(23 11:58)                                                            sp3 

      Symptoms: have worsened(23 11:58)                                                 sp3 

      Accepting Physician: Dr. Nj(23 15:23)                                           ld1 

      Diagnosis                                                                                   

        - Unspecified atrial flutter(23 11:58)                                          sp3 

      Discharge Instructions:                                                                     

        - Discharge Summary Sheet                                                             ld1 

        - Electrophysiology Study                                                             ld1 

      Forms:                                                                                      

        - Medication Reconciliation Form                                                      sp3 

        - SBAR form                                                                           sp3 

Signatures:                                                                                       

Dispatcher [employer]                           EDMS                                                 

Alison Leslie RN                     RN   aa5                                                  

Babita Navarro RN                    RN   vg1                                                  

Renita Langley RN                     RN   ld1                                                  

Masoud Sheets MD MD   sp3                                                  

                                                                                                  

Corrections: (The following items were deleted from the chart)                                    

10:14 09:31 Inpatient Admission sp3                                                           sp3 

10:14 09:31 Jozef Espinoza sp3                                                                sp3 

10:14 09:31 Telemetry/MedSurg (Inpatient) sp3                                                 sp3 

10:15 09:31 Stable sp3                                                                        sp3 

10:15 09:31 new sp3                                                                           sp3 

10:15 09:31 have worsened sp3                                                                 sp3 

10:15 09:31 Standard sp3                                                                      sp3 

10:15 09:31 sp3                                                                               sp3 

10:15 09:31 Unspecified atrial flutter sp3                                                    sp3 

10:15 09:31 Palpitations sp3                                                                  sp3 

15:23 11:58 Dr. Nj sp3                                                                      ld1 

                                                                                                  

**************************************************************************************************

## 2023-01-12 NOTE — EKG
Test Date:    2023-01-11               Test Time:    09:18:47

Technician:   MAKSIM                                     

                                                     

MEASUREMENT RESULTS:                                       

Intervals:                                           

Rate:         113                                    

TX:                                                  

QRSD:         76                                     

QT:           286                                    

QTc:          392                                    

Axis:                                                

P:            264                                    

TX:                                                  

QRS:          28                                     

T:            34                                     

                                                     

INTERPRETIVE STATEMENTS:                                       

                                                     

Atrial flutter with variable AV block

Nonspecific ST abnormality

Abnormal ECG

Compared to ECG 01/11/2023 08:44:00

ST (T wave) deviation now present

Sinus tachycardia no longer present

Short TX interval no longer present

Right bundle-branch block no longer present

T-wave abnormality no longer present

Possible ischemia no longer present



Electronically Signed On 01-12-23 14:21:48 CST by Landon Florence

## 2023-01-18 ENCOUNTER — HOSPITAL ENCOUNTER (EMERGENCY)
Dept: HOSPITAL 97 - ER | Age: 58
Discharge: HOME | End: 2023-01-18
Payer: COMMERCIAL

## 2023-01-18 VITALS — OXYGEN SATURATION: 93 % | TEMPERATURE: 98.2 F

## 2023-01-18 VITALS — DIASTOLIC BLOOD PRESSURE: 95 MMHG | SYSTOLIC BLOOD PRESSURE: 129 MMHG

## 2023-01-18 DIAGNOSIS — U07.1: Primary | ICD-10-CM

## 2023-01-18 DIAGNOSIS — J18.9: ICD-10-CM

## 2023-01-18 LAB
ALBUMIN SERPL BCP-MCNC: 3.7 G/DL (ref 3.4–5)
ALP SERPL-CCNC: 65 U/L (ref 45–117)
ALT SERPL W P-5'-P-CCNC: 122 U/L (ref 16–61)
ANISOCYTOSIS BLD QL: SLIGHT
AST SERPL W P-5'-P-CCNC: 26 U/L (ref 15–37)
BLD SMEAR INTERP: (no result)
BUN BLD-MCNC: 17 MG/DL (ref 7–18)
GLUCOSE SERPLBLD-MCNC: 122 MG/DL (ref 74–106)
HCT VFR BLD CALC: 29.2 % (ref 39.6–49)
INR BLD: 1.15
LYMPHOCYTES # SPEC AUTO: 1.4 K/UL (ref 0.7–4.9)
MACROCYTES BLD QL: SLIGHT
MCV RBC: 103 FL (ref 80–100)
MORPHOLOGY BLD-IMP: (no result)
NT-PROBNP SERPL-MCNC: 886 PG/ML (ref ?–125)
PMV BLD: 7.6 FL (ref 7.6–11.3)
POLYCHROMASIA BLD QL SMEAR: SLIGHT
POTASSIUM SERPL-SCNC: 4.1 MMOL/L (ref 3.5–5.1)
RBC # BLD: 2.83 M/UL (ref 4.33–5.43)
SARS-COV-2 RNA RESP QL NAA+PROBE: POSITIVE
TROPONIN I SERPL HS-MCNC: 13.4 PG/ML (ref ?–58.9)

## 2023-01-18 PROCEDURE — 93005 ELECTROCARDIOGRAM TRACING: CPT

## 2023-01-18 PROCEDURE — 80053 COMPREHEN METABOLIC PANEL: CPT

## 2023-01-18 PROCEDURE — 0240U: CPT

## 2023-01-18 PROCEDURE — 99284 EMERGENCY DEPT VISIT MOD MDM: CPT

## 2023-01-18 PROCEDURE — 87040 BLOOD CULTURE FOR BACTERIA: CPT

## 2023-01-18 PROCEDURE — 85025 COMPLETE CBC W/AUTO DIFF WBC: CPT

## 2023-01-18 PROCEDURE — 71045 X-RAY EXAM CHEST 1 VIEW: CPT

## 2023-01-18 PROCEDURE — 85610 PROTHROMBIN TIME: CPT

## 2023-01-18 PROCEDURE — 96374 THER/PROPH/DIAG INJ IV PUSH: CPT

## 2023-01-18 PROCEDURE — 85730 THROMBOPLASTIN TIME PARTIAL: CPT

## 2023-01-18 PROCEDURE — 36415 COLL VENOUS BLD VENIPUNCTURE: CPT

## 2023-01-18 PROCEDURE — 84484 ASSAY OF TROPONIN QUANT: CPT

## 2023-01-18 PROCEDURE — 83605 ASSAY OF LACTIC ACID: CPT

## 2023-01-18 PROCEDURE — 83880 ASSAY OF NATRIURETIC PEPTIDE: CPT

## 2023-01-18 NOTE — EDPHYS
Physician Documentation                                                                           

 [facility]                                                                 

Name: Corby Cotter                                                                               

Age: 57 yrs                                                                                       

Sex: Male                                                                                         

: 1965                                                                                   

MRN: [id number]                                                                                   

Arrival Date: 2023                                                                          

Time: 08:14                                                                                       

Account#: [id number]                                                                            

Bed 20                                                                                            

Private MD:                                                                                       

ED Physician Valdemar Russo                                                                       

HPI:                                                                                              

                                                                                             

09:12 This 57 yrs old  Male presents to ER via Ambulatory with complaints of          kb  

      Breathing Difficulty.                                                                       

09:12 The patient or guardian reports cough, that is intermittent, difficulty breathing, flu  kb  

      symptoms, low-grade fever. Onset: The symptoms/episode began/occurred last night.           

      Severity of symptoms: At their worst the symptoms were moderate, in the emergency           

      department the symptoms are unchanged. Modifying factors: The symptoms are alleviated       

      by nothing, the symptoms are aggravated by nothing. Associated signs and symptoms:          

      Pertinent positives: fever. The patient has not experienced similar symptoms in the         

      past. The patient has been recently seen by a physician: with different complaint(s).       

      Pt reports fever and shortness of breath that started last night. States he was             

      recently at [facility] for a heart rate of 152 that wouldn't respond to               

      medications. .                                                                              

                                                                                                  

Historical:                                                                                       

- Allergies:                                                                                      

08:28 No Known Allergies;                                                                     ap3 

- Home Meds:                                                                                      

08:28 Flomax 0.4 mg Oral cap [Active]; metoprolol tartrate Oral [Active]; flecainide oral     ap3 

      [Active];                                                                                   

- PMHx:                                                                                           

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 

                                                                                                  

- Immunization history:: Client reports receiving the 2nd dose of the Covid vaccine,              

  Flu vaccine is not up to date.                                                                  

- Social history:: Smoking status: Patient denies any tobacco usage or history of.                

                                                                                                  

                                                                                                  

ROS:                                                                                              

09:12 Cardiovascular: Negative for chest pain, palpitations, and edema.                       kb  

09:12 Constitutional: Positive for fever.                                                         

09:12 Respiratory: Positive for shortness of breath.                                              

09:12 All other systems are negative.                                                             

                                                                                                  

Exam:                                                                                             

09:01 Constitutional:  This is a well developed, well nourished patient who is awake, alert,  kb  

      and in no acute distress. Head/Face:  Normocephalic, atraumatic. ENT:  Moist Mucous         

      membranes Cardiovascular:  Regular rate and rhythm with a normal S1 and S2.  No             

      gallops, murmurs, or rubs.  No pulse deficits. Respiratory:  Respirations even and          

      unlabored. No increased work of breathing. Talking in full sentences Abdomen/GI:  Soft,     

      non-tender. No distention Skin:  Warm, dry with normal turgor.  Normal color. MS/           

      Extremity:  Pulses equal, no cyanosis.  Neurovascular intact.  Full, normal range of        

      motion. Neuro:  Awake and alert, GCS 15, oriented to person, place, time, and               

      situation. Moves all extremities. Normal gait. Psych:  Awake, alert, with orientation       

      to person, place and time.  Behavior, mood, and affect are within normal limits.            

09:01 ECG was reviewed by the Attending Physician.                                                

                                                                                                  

Vital Signs:                                                                                      

08:24 Pulse 84; Pulse Ox 98% on R/A; Weight 121.56 kg; Height 6 ft. 3 in. (190.50 cm);        ap3 

08:33 Temp 100.5;                                                                             ap3 

08:37  / 95 RA (auto/lg);                                                               ap3 

09:07 Resp 22;                                                                                ss  

10:04 Pulse 74; Resp 22; Temp 98.2(O); Pulse Ox 93% ;                                         jh5 

08:24 Body Mass Index 33.50 (121.56 kg, 190.50 cm)                                            ap3 

                                                                                                  

MDM:                                                                                              

08:30 Patient medically screened.                                                             kb  

11:09 Differential Diagnosis: Bronchitis Influenza Upper Respiratory Infection Viral Syndrome kb  

      Pneumonia Other covid. Data reviewed: vital signs, nurses notes. Consideration of           

      Admission/Observation Escalation of care including admission/observation considered.        

      Management of patient was discussed with the following: Dr Russo. Independent             

      interpretation of the following test(s) in the Emergency Department EKG: See my EKG         

      interpretation above. External Records Reviewed: Labs reviewed from recent admission to     

      [facility]. Platelet count 15 at time of discharge, 19 today. Counseling: I had a         

      detailed discussion with the patient and/or guardian regarding: the historical points,      

      exam findings, and any diagnostic results supporting the discharge/admit diagnosis, lab     

      results, radiology results, the need for outpatient follow up, a family practitioner,       

      to return to the emergency department if symptoms worsen or persist or if there are any     

      questions or concerns that arise at home.                                                   

                                                                                                  

                                                                                             

08:31 Order name: Blood Culture Adult (2)                                                     kb  

                                                                                             

08:31 Order name: CBC with Diff; Complete Time: 10:45                                         kb  

                                                                                             

08:31 Order name: CMP; Complete Time: 09:49                                                   kb  

                                                                                             

08:31 Order name: Lactate w/ 2H reflex if indic.; Complete Time: 09:33                        kb  

                                                                                             

08:31 Order name: Protime (+inr); Complete Time: 09:33                                        kb  

                                                                                             

08:31 Order name: Ptt, Activated; Complete Time: 09:33                                        kb  

                                                                                             

08:31 Order name: Chest Single View XRAY; Complete Time: 09:33                                kb  

                                                                                             

08:31 Order name: COVID-19/FLU A+B; Complete Time: 11:09                                      kb  

                                                                                             

08:31 Order name: Troponin High Sensitivity; Complete Time: 09:49                             kb  

                                                                                             

08:31 Order name: BNP; Complete Time: 09:49                                                   kb  

                                                                                             

10:30 Order name: CBC Smear Scan; Complete Time: 10:45                                        EDMS

                                                                                             

08:31 Order name: EKG; Complete Time: 08:32                                                   kb  

                                                                                             

08:31 Order name: Accucheck; Complete Time: 09:46                                             kb  

                                                                                             

08:31 Order name: Cardiac monitoring; Complete Time: 09:03                                    kb  

                                                                                             

08:31 Order name: EKG - Nurse/Tech; Complete Time: 08:53                                      kb  

                                                                                             

08:31 Order name: IV Saline Lock - Large Bore; Complete Time: 09:07                           kb  

                                                                                             

08:31 Order name: Labs collected and sent; Complete Time: 09:07                               kb  

                                                                                             

08:31 Order name: O2 Per Protocol; Complete Time: 08:37                                       kb  

                                                                                             

08:31 Order name: O2 Sat Monitoring; Complete Time: 08:37                                     kb  

                                                                                             

08:31 Order name: Vital Signs; Complete Time: 08:37                                           kb  

                                                                                                  

EC:01 Rate is 77 beats/min. Rhythm is regular. QRS Axis is Normal. OK interval is normal at   kb  

      144 msec. QRS interval is normal at 82 msec. QT interval is normal at 411 msec.             

                                                                                                  

Administered Medications:                                                                         

09:07 Drug: Acetaminophen 1000 mg Route: PO;                                                  ss  

10:03 Drug: Zithromax (azithromycin) 500 mg Route: PO;                                        jh5 

10:03 Drug: Rocephin (cefTRIAXone) 1 grams Route: IV; Rate: calculated rate; Site: right      jh5 

      forearm;                                                                                    

                                                                                                  

                                                                                                  

Disposition:                                                                                      

13:31 Co-signature as Attending Physician, Valdemar Russo MD I agree with the assessment and   kdr 

      plan of care.                                                                               

                                                                                                  

Disposition Summary:                                                                              

23 11:12                                                                                    

Discharge Ordered                                                                                 

      Location: Home                                                                          kb  

      Condition: Stable                                                                       kb  

      Diagnosis                                                                                   

        - SARS-associated coronavirus as the cause of diseases classified elsewhere           kb  

        - Pneumonia, unspecified organism                                                     kb  

      Followup:                                                                               kb  

        - With: Emergency Department                                                               

        - When: As needed                                                                          

        - Reason: Worsening of condition                                                           

      Followup:                                                                               kb  

        - With: Private Physician                                                                  

        - When: 2 - 3 days                                                                         

        - Reason: Recheck today's complaints, Continuance of care, Re-evaluation by your           

      physician                                                                                   

      Discharge Instructions:                                                                     

        - Discharge Summary Sheet                                                             kb  

        - Community-Acquired Pneumonia, Adult, Easy-to-Read                                   kb  

        - COVID-19                                                                            kb  

      Forms:                                                                                      

        - Medication Reconciliation Form                                                      kb  

        - Thank You Letter                                                                    kb  

        - Antibiotic Education                                                                kb  

        - Prescription Opioid Use                                                             kb  

      Prescriptions:                                                                              

        - Zithromax 500 mg Oral Tablet                                                             

            - take 1 tablet by ORAL route once daily for 5 days; 5 tablet; Refills: 0,        kb  

      Product Selection Permitted                                                                 

Signatures:                                                                                       

Dispatcher [employer]                           EDMS                                                 

Maty Cabrera, HIPOLITO-C                 FNP-Valdemar Lala MD MD kdr Smirch, Shelby, RN RN   ss                                                   

Marge Turcios RN                    RN   ap3                                                  

Bronwyn Caballero RN                       RN   jh5                                                  

                                                                                                  

**************************************************************************************************

## 2023-01-18 NOTE — ER
Nurse's Notes                                                                                     

 [facility]                                                                 

Name: Corby Cotter                                                                               

Age: 57 yrs                                                                                       

Sex: Male                                                                                         

: 1965                                                                                   

MRN: [id number]                                                                                   

Arrival Date: 2023                                                                          

Time: 08:14                                                                                       

Account#: [id number]                                                                            

Bed 20                                                                                            

Private MD:                                                                                       

Diagnosis: SARS-associated coronavirus as the cause of diseases classified elsewhere;Pneumonia,   

  unspecified organism                                                                            

                                                                                                  

Presentation:                                                                                     

                                                                                             

08:24 Chief complaint: Patient states: he feels short of breath and like he has fever for the ap3 

      last few days. Coronavirus screen: Client presents with at least one sign or symptom        

      that may indicate coronavirus-19. Ebola Screen: No symptoms or risks identified at this     

      time. Initial Sepsis Screen:. Risk Assessment: Do you want to hurt yourself or someone      

      else? Patient reports no desire to harm self or others. Onset of symptoms was January       

      15, 2023.                                                                                   

08:24 Method Of Arrival: Ambulatory                                                           ap3 

08:33 Initial Sepsis Screen: Does the patient meet any 2 criteria? No. Patient's initial      ap3 

      sepsis screen is negative. Does the patient have a suspected source of infection? No.       

      Patient's initial sepsis screen is negative.                                                

08:33 Acuity: NIURKA 3                                                                           ap3 

                                                                                                  

Triage Assessment:                                                                                

08:32 General: Appears in no apparent distress. uncomfortable, Behavior is anxious. Pain:     ap3 

      Denies pain. Neuro: Level of Consciousness is awake, alert, obeys commands, Oriented to     

      person, place, time, situation. Cardiovascular: Patient's skin is warm and dry.             

      Respiratory: Reports shortness of breath Airway is patent Respiratory effort is even,       

      unlabored, Onset: The symptoms/episode began/occurred , the patient has mild shortness      

      of breath.                                                                                  

                                                                                                  

Historical:                                                                                       

- Allergies:                                                                                      

08:28 No Known Allergies;                                                                     ap3 

- Home Meds:                                                                                      

08:28 Flomax 0.4 mg Oral cap [Active]; metoprolol tartrate Oral [Active]; flecainide oral     ap3 

      [Active];                                                                                   

- PMHx:                                                                                           

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 

                                                                                                  

- Immunization history:: Client reports receiving the 2nd dose of the Covid vaccine,              

  Flu vaccine is not up to date.                                                                  

- Social history:: Smoking status: Patient denies any tobacco usage or history of.                

                                                                                                  

                                                                                                  

Screenin:33 [facility] ED Fall Risk Assessment (Adult) History of falling in the last 3 months,       ap3 

      including since admission No falls in past 3 months (0 pts). Abuse screen: Denies           

      threats or abuse. Nutritional screening: No deficits noted. Tuberculosis screening: No      

      symptoms or risk factors identified.                                                        

                                                                                                  

Assessment:                                                                                       

09:22 Cardiovascular: Rhythm is regular. Respiratory: Airway is patent Breath sounds are      jh5 

      clear.                                                                                      

                                                                                                  

Vital Signs:                                                                                      

08:24 Pulse 84; Pulse Ox 98% on R/A; Weight 121.56 kg; Height 6 ft. 3 in. (190.50 cm);        ap3 

08:33 Temp 100.5;                                                                             ap3 

08:37  / 95 RA (auto/lg);                                                               ap3 

09:07 Resp 22;                                                                                ss  

10:04 Pulse 74; Resp 22; Temp 98.2(O); Pulse Ox 93% ;                                         jh5 

08:24 Body Mass Index 33.50 (121.56 kg, 190.50 cm)                                            ap3 

                                                                                                  

ED Course:                                                                                        

08:14 Patient arrived in ED.                                                                  rg4 

08:20 Valdemar Russo MD is Attending Physician.                                              kdr 

08:21 [facility]                                                        kb  

08:33 Triage completed.                                                                       ap3 

08:34 Arm band placed on right wrist.                                                         ap3 

08:53 Chest Single View XRAY In Process Unspecified.                                          EDMS

08:53 EKG done, by ED staff.                                                                  bc6 

09:07 BNP Sent.                                                                               ss  

09:07 Troponin High Sensitivity Sent.                                                         ss  

09:07 COVID-19/FLU A+B Sent.                                                                  ss  

09:08 Inserted saline lock: 22 gauge in right forearm, using aseptic technique. Blood         ss  

      collected.                                                                                  

09:23 Patient has correct armband on for positive identification. Call light in reach. Side   jh5 

      rails up X 1.                                                                               

10:03 Bronwyn Caballero, RN is Primary Nurse.                                                     jh5 

11:31 No provider procedures requiring assistance completed. IV discontinued, intact,         jh5 

      bleeding controlled, No redness/swelling at site. Pressure dressing applied.                

                                                                                                  

Administered Medications:                                                                         

09:07 Drug: Acetaminophen 1000 mg Route: PO;                                                  ss  

10:03 Drug: Zithromax (azithromycin) 500 mg Route: PO;                                        jh5 

10:03 Drug: Rocephin (cefTRIAXone) 1 grams Route: IV; Rate: calculated rate; Site: right      [facility] 

      forearm;                                                                                    

                                                                                                  

                                                                                                  

Medication:                                                                                       

11:32 VIS not applicable for this client.                                                     jh5 

                                                                                                  

Outcome:                                                                                          

11:12 Discharge ordered by MD.                                                                cori  

11:31 Discharged to home ambulatory.                                                          5 

11:31 Condition: good                                                                             

11:31 Discharge instructions given to patient, Instructed on discharge instructions, follow       

      up and referral plans. medication usage, safety practices, Demonstrated understanding       

      of instructions, follow-up care, medications, Prescriptions given X 1.                      

11:38 Patient left the ED.                                                                    5 

                                                                                                  

Signatures:                                                                                       

Dispatcher [employer]                           EDMS                                                 

Maty Cabrera, FNP-C                 FNP-CkValdemar Pagan MD MD kdr Smirch, Shelby, RN                      RN   ss                                                   

Joaquina Navarro                                 rg4                                                  

Marge Turcios RN                    RN   ap3                                                  

Bronwyn Caballero RN                       RN   jh5                                                  

Beatriz Mirza                           bc6                                                  

                                                                                                  

**************************************************************************************************

## 2023-01-18 NOTE — XMS REPORT
Continuity of Care Document

                           Created on:2023



Patient:TAQUERIA BELCHER

Sex:Male

:1965

External Reference #:[id number]





Demographics







                          Address                   [address] [place] [address]

 

                          Home Phone                [phone]

 

                          Work Phone                [phone]

 

                          Mobile Phone              [phone]

 

                          Email Address             [email or web address]

 

                          Preferred Language        English

 

                          Marital Status            Unknown

 

                          Quaker Affiliation     Unknown

 

                          Race                      Unknown

 

                          Additional Race(s)        Unavailable



                                                    Unavailable



                                                    White

 

                          Ethnic Group               or 









Author







                          Organization              [facility]

t

 

                          Address                   [address]



                                                    [place] [address]

 

                          Phone                     [phone]









Support







                Name            Relationship    Address         Phone

 

                Dione Belcher Spouse          Unavailable     [phone]

 

                ORIANA BELCHER SP              [address] [facility] [phone]



                                                [place] [address] 

 

                SHANIKA BELCHER               Unavailable     [phone]

 

                MITCHELL BELCHER  X               Unavailable     [phone]









Care Team Providers







                    Name                Role                Phone

 

                    Asked, No Pcp       Primary Care Physician Unavailable

 

                    SEAN HEDRICK Attending Clinician Unavailable

 

                    Flynn Martinez    Attending Clinician Unavailable

 

                    AMADOU NJ       Attending Clinician Unavailable

 

                    Amadou Nj MD    Attending Clinician [phone]

 

                    Only, Ang Db Test   Attending Clinician Unavailable

 

                    Leanna Dimas Attending Clinician [phone]

 

                    LEANNA BARAJAS   Attending Clinician Unavailable

 

                    Doctor Unassigned, No Name Attending Clinician Unavailable

 

                    KSENIA CARPENTER       Attending Clinician Unavailable

 

                    OLENA JADE Attending Clinician Unavailable

 

                    Olena Jade Attending Clinician [phone]

 

                    SEAN HEDRICK        Attending Clinician Unavailable

 

                    TIM HILLMAN Attending Clinician Unavailable

 

                    [facility] Attending Clinician Unavailable

 

                    Tra Osborne  Attending Clinician [phone]

 

                    TRA LORENZO      Attending Clinician Unavailable

 

                    SEAN HEDRICK Admitting Clinician Unavailable

 

                    AMADOU [place]       Admitting Clinician Unavailable









Payers







           Payer Name Policy Type Policy Number Effective Date Expiration Date S

our

 

           BCBS PPO POS EPO            [id number] 2020 00:00:00         

   



           CHOICE                                                 

 

           PPO/EPO - BCBS            [id number] 2020 00:00:00           

 

 

           CVCP-BCBS             [id number]                       







Problems







       Condition Condition Condition Status Onset  Resolution Last   Treating Co

mments 

Source



       Name   Details Category        Date   Date   Treatment Clinician        



                                                 Date                 

 

       Atrial Atrial Disease Active                              [facility]



       flutter flutter               1-11                               [facility]



       with rapid with rapid               00:00:                             Me

dical



       ventricula ventricula               00                                 Ce

nter



       r response r response                                                  

 

       PANCTOPENI  PANCTOPEN Diagnosis Active 2021          

     [facility]



       A      IA Active                         08:45:00               l



              2021               00:00:                             [facility]               00                                 



              [place]                                                  

 

       Paroxysmal Paroxysmal Disease Active                              [facility]

[facility]



       atrial atrial                                              [place]



       fibrillati fibrillati               00:00:                             of



       on with on with               00                                 [facility]



       [facility]    [facility]                                                     [facility]



       [facility]                                                  

 

       Acute  Acute  Disease Active                              [facility]



       hypoxemic hypoxemic               4-21                               [facility]

s



       respirator respirator               00:00:                             Me

dical



       y failure y failure               00                                 Cent

er

 

       Benign Benign Disease Active                       Overview: [facility]



       prostatic prostatic               4-15                        [facility]

[facility]



       hyperplasi hyperplasi               00:00:                      g of this

 Medical



       a with a with               00                          note   Center



       urinary urinary                                           might be 



       retention retention                                           different 



                                                               from the 



                                                               original. 



                                                               On Flomax 



                                                               at home 

 

       Former Former Disease Active                       [facility]: [facility]



       smoker smoker               4-15                        [facility]



                                   00:00:                      g of this Medical



                                   00                          note   Center



                                                               might be 



                                                               different 



                                                               from the 



                                                               original. 



                                                               Quit 3 



                                                               months 



                                                               ago    

 

       L empyema L empyema Disease Active                              [facility]



       s/p L VATS s/p L VATS               4-15                               [facility]

kes



       washout/de washout/de               00:00:                             Me

dical



       corticatio corticatio               00                                 Ce

nter



       n      n                                                       



       2021                                                  

 

       Thrombocyt Thrombocyt Disease Active                              [facility]

[facility]



       openia openia               4-14                               [facility]



       [facility]               00:00:                             of



                                   00                                 [facility]



                                                                      e

 

       Tachycardi Tachycardi Disease Active                              C

[facility]



       a      a                    4-14                               [facility]



                                   00:00:                             Medical



                                   00                                 Center

 

       Thrombocyt Thrombocyt Disease Active                              C

[facility]



       openia openia               4-14                               [facility]



                                   00:00:                             Medical



                                   00                                 [place]

 

       Pleural Pleural Disease Active                              [facility]



       effusion, effusion,               4-14                               [facility]

s



       left   left                 00:00:                             Medical



                                   00                                 Center

 

       Hemodynami Hemodynami Disease Active                                    C

[facility]



       c      c                                                       [facility]



       instabilit instabilit                                                  Me

dical



       y      y                                                       Center

 

       Respirator Respirator Disease Active                                    C

[facility]



       y      y                                                       [facility]



       insufficie insufficie                                                  Me

dical



       ncy    ncy                                                     Center

 

       Paroxysmal Paroxysmal Disease Active                                    C

[facility]



       atrial atrial                                                  [facility]



       fibrillati fibrillati                                                  Me

dical



       on with on with                                                  Center



       RVR    RVR                                                     

 

       Urinary Urinary Disease Active                                    [facility]



       retention retention                                                  [facility]

 

       No known No known Disease                                           [facility]

di



       active active                                                  st



       problems problems                                                  Hospit

a



                                                                      l







Allergies, Adverse Reactions, Alerts







       Allergy Allergy Status Severity Reaction(s) Onset  Inactive Treating Comm

ents 

Source



       Name   Type                        Date   Date   Clinician        

 

       NO KNOWN Allergy Active                                           [facility]



       ALLERGIE                                                         [facility]

 

       NO KNOWN Drug   Active                                           [facility]



       ALLERGIE Class                                                   ity of



       S                                                              [facility]







Social History







           Social Habit Start Date Stop Date  Quantity   Comments   Source

 

           History of tobacco                       Cigarette Smoker            

[facility]



           use                                                    [facility]

 

           Exposure to                       Yes                   [facility]



           SARS-CoV-2 (event)                                             [facility]

 

           Alcohol intake 2021 Current drinker            [facility]

dist



                      00:00:00   00:00:00   of alcohol            Hospital



                                            (finding)             

 

           Cigarettes smoked 2021                       [facility]



           current (pack per 00:00:00   00:00:00                         Medicin

e



           day) - Reported                                             

 

           Cigarette  2021                       [facility]

 of



           pack-years 00:00:00   00:00:00                         Medicine

 

           History [facility] 2021 4                     [facility]



           Alcohol Binge 00:00:00   00:00:00                         Medical [facility]

ter

 

           History [facility] 2021 3                     [facility]



           Alcohol Frequency 00:00:00   00:00:00                         Medical

 Center

 

           History [facility] 2021 4                     [facility]



           Alcohol Std Drinks 00:00:00   00:00:00                         Medica

l Center

 

           History [facility] 2021 accdg to wife            [facility]

kes



           Alcohol Comment 00:00:00   00:00:00   and sister            Medical C

enter



                                            drinks 1 bottle            



                                            of wine weekly.            

 

           Tobacco Comment 2021 neftaly sweets 3            [facility]



                      00:00:00   00:00:00   a day accdg to            Medical Ce

nter



                                            wife and sister.            

 

           Tobacco use and 2020 Never used            [facility]

y of



           exposure   00:00:00   00:00:00                         [facility]

 

           Sex Assigned At 1965                       [facility]



           Birth      00:00:00   00:00:00                         Hospital









                Smoking Status  Start Date      Stop Date       Source

 

                Social History  2021 13:20:32 2021 13:20:32 [facility]

 

                Current every day 2021 00:00:00                 [facility]

 Medical



                smoker                                          Center







Medications







       Ordered Filled Start  Stop   Current Ordering Indication Dosage Frequency

 Signature

                    Comments            Components          Source



     Medication Medication Date Date Medication? Clinician                (SIG) 

          



     Name Name                                                   

 

     metoprolol            Yes            25mg      Take 1           [facility]

t



     tartrate      -12                               tablet (25           [facility]



     (LOPRESSOR)      00:00:                               mg total)           M

edical



     25 MG      00                                 by mouth           Center



     tablet                                         every 8           



                                                  (eight)           



                                                  hours.           

 

     flecainide      2024- Yes            75mg      Take 0.5           [facility]

[facility]



     (TAMBOCOR)      12                          tablets           [facility]



     150 MG      00:00: 23:59                          (75 mg           Medical



     tablet      00   :00                           total) by           Center



                                                  mouth           



                                                  every 12           



                                                  (twelve)           



                                                  hours.           

 

     dexAMETHaso      2023- No             40mg      Take 10           [facility]

[facility]



     ne        16                          tablets           [facility]



     (DECADRON)      00:00: 23:59                          (40 mg           Medi

jasmine



     4 MG tablet      00   :00                           total) by           [facility]

ter



                                                  mouth           



                                                  daily with           



                                                  breakfast           



                                                  for 4           



                                                  days.           

 

     tamsulosin            Yes            .8mg QD   Take 0.8           Met

hodi



     (Flomax)      8-18                               mg by           st



     0.4 mg      13:29:                               mouth           [facility]osp[facility]



     capsule      17                                 daily.           l

 

     tamsulosin            Yes            .8mg QD   Take 0.8           Met

hodi



     (Flomax)      8-18                               mg by           st



     0.4 mg      13:29:                               mouth           [facility]osp[facility]



     capsule      17                                 daily.           l

 

     MethylPREDN            Yes                      See            [facility]



     ISolone      6-16                               Instructio           l



     Dose Pack 4      13:28:                               ns, PO,           Her

bates



     mg oral      00                                 Daily, Use           



     tablet                                         as             



                                                  directed           



                                                  on label.,           



                                                  # 21 tab,           



                                                  0              



                                                  Refill(s)           

 

     MethylPREDN      0      Yes                      See            [facility]

a



     ISolone      6-16                               Instructio           l



     Dose Pack 4      13:28:                               ns, PO,           Her

bates



     mg oral      00                                 Daily, Use           



     tablet                                         as             



                                                  directed           



                                                  on label.,           



                                                  # 21 tab,           



                                                  0              



                                                  Refill(s)           

 

     MethylPREDN      0      Yes                      See            [facility]

a



     ISolone      6-16                               Instructio           l



     Dose Pack 4      13:28:                               ns, PO,           Her

bates



     mg oral      00                                 Daily, Use           



     tablet                                         as             



                                                  directed           



                                                  on label.,           



                                                  # 21 tab,           



                                                  0              



                                                  Refill(s)           

 

     Flomax      -0      Yes                      0.4 mg,           [facility]



               6-16                               PO, Daily,           l



               02:00:                               0              [place]



               00                                 Refill(s)           

 

     Flomax      -0      Yes                      0.4 mg,           [facility]



               6-16                               PO, Daily,           l



               02:00:                               0              [place]



               00                                 Refill(s)           

 

     Flomax      -0      Yes                      0.4 mg,           [facility]



               6-16                               PO, Daily,           l



               02:00:                               0              [place]



               00                                 Refill(s)           

 

     digoxin            Yes                      TAKE ONE           [facility]



     (LANOXIN)                                     (1)            [facility]



     125 MCG      00:00:                               TABLET(S)           of



     tablet      00                                 BY MOUTH           [facility]



                                                  DAILY.           e

 

     metoprolol            Yes                      TAKE           [facility]



     (LOPRESSOR)                                     ONE-HALF           [facility]

ege



     25 MG      00:00:                               (1/2)           of



     tablet      00                                 TABLET(S)           [facility]



                                                  BY MOUTH           e



                                                  EVERY           



                                                  EIGHT           



                                                  HOURS.           

 

     digoxin            Yes            125ug QD   Take 1           [facility]



     (LANOXIN)                                     tablet           [facility]



     0.125 MG      00:00:                               (125 mcg           Medic

al



     tablet      00                                 total) by           Center



                                                  mouth           



                                                  daily.           

 

     digoxin      3- No             125ug QD   Take 1           [facility]



     (LANOXIN)       01-12                          tablet           [facility]



     0.125 MG      00:00: 00:00                          (125 mcg           Medi

jasmine



     tablet      00   :00                           total) by           Center



                                                  mouth           



                                                  daily.           

 

     amiodarone            Yes                      TAKE ONE           [place]

[facility]



     (PACERONE)                                     (1)            [facility]



     200 MG      00:00:                               TABLET(S)           of



     tablet      00                                 BY MOUTH           [facility]



                                                  TWICE A           e



                                                  DAY.           

 

     doxycycline            Yes                      TAKE ONE           

[facility]



     (MONODOX)                                     (1)            [facility]



     100 MG      00:00:                               CAPSULE(S)           of



     capsule      00                                 BY MOUTH           [facility]



                                                  EVERY           e



                                                  TWELVE           



                                                  HOURS FOR           



                                                  21 DAYS.           

 

     Tamsulosin      -0      Yes            .4mg      Take 0.4           [facility]

[facility]



     HCl 0.4 MG      4-26                               mg by           [place]



     CAPS      00:00:                               mouth           of



               00                                 daily.           [facility]



                                                                 e

 

     metoprolol      -0      Yes            12.5mg      Take 0.5           C

[facility]



     tartrate      4-26                               tablets           [facility]



     (LOPRESSOR)      00:00:                               (12.5 mg           Me

dical



     25 MG      00                                 total) by           Center



     tablet                                         mouth           



                                                  every 8           



                                                  (eight)           



                                                  hours.           

 

     tamsulosin      -0      Yes            .4mg QD   Take 1           [facility]

[facility]



     (FLOMAX)      4-26                               capsule           [facility]



     0.4 mg Cap      00:00:                               (0.4 mg           Medi

jasmine



     24 hr      00                                 total) by           Center



     capsule                                         mouth           



                                                  daily.           

 

     tamsulosin      -0      Yes            .4mg QD   Take 1           [facility]

[facility]



     (FLOMAX)      4-26                               capsule           [facility]



     0.4 mg Cap      00:00:                               (0.4 mg           Medi

jasmine



     24 hr      00                                 total) by           Center



     capsule                                         mouth           



                                                  daily.           

 

     metoprolol      0 3- No             12.5mg      Take 0.5           

[facility]



     tartrate      4-26 01-12                          tablets           [facility]



     (LOPRESSOR)      00:00: 00:00                          (12.5 mg           M

edical



     25 MG      00   :00                           total) by           Center



     tablet                                         mouth           



                                                  every 8           



                                                  (eight)           



                                                  hours.           

 

     cyclobenzap      -0      Yes                      as needed.           

[facility]



     reynaldo      4-12                                              [place]



     (FLEXERIL)      00:00:                                              of



     10 MG      00                                                Medicin



     tablet                                                        e

 

     tamsulosin      0      Yes                      Take by           Univ

ers



     HCl (FLOMAX      6-29                               mouth.           ity of



     ORAL)      16:03:                                              [address]

 

     tamsulosin      -0      Yes                      Take by           Univ

ers



     HCl (FLOMAX      6-29                               mouth.           ity of



     ORAL)      11:03:                                              [address]

 

     tamsulosin      2020-0      Yes                      Take by           Univ

ers



     HCl (FLOMAX      6-29                               mouth.           ity of



     ORAL)      11:03:                                              [address]

 

     benzonatate      -0 2020- No        [id number] 100mg      Take 1         

  [facility]



     (TESSALON      6-29 07-14                          capsule by           ity

 of



     PERLES) 100      00:00: 04:59                          mouth 3           Te

[place]



     mg capsule      00   :00                           (three)           Med[facility]

l



                                                  times           [facility]



                                                  daily for           



                                                  14 days.           







Vital Signs







             Vital Name   Observation Time Observation Value Comments     Source

 

             WEIGHT       2021 16:00:00 108.5 kg                  

 

             WEIGHT       2021 05:30:00 117 kg                    

 

             WEIGHT       2021 04:21:00 116.6 kg                  

 

             WEIGHT       2021 02:38:00 115 kg                    

 

             WEIGHT       2021 04:00:00 112.2 kg                  

 

             HEIGHT       2021 22:00:00 190.5 cm                  

 

             WEIGHT       2021 22:00:00 112.49 kg                 

 

             WEIGHT       2023 08:08:00 115.7 kg                  

 

             WEIGHT       2023 08:08:00 115.7 kg                  

 

             WEIGHT       2023 08:08:00 115.7 kg                  

 

             Systolic blood 2021 18:19:00 101 mm[Hg]                [facility]



             pressure                                            Medicine

 

             Diastolic blood 2021 18:19:00 59 mm[Hg]                 [facility]                                            Medicine

 

             Heart rate   2021 18:19:00 87 /min                   [facility]

 

             Body temperature 2021 18:19:00 36.78 Katlyn                 [facility]

 

             Body height  2021 18:19:00 190.5 cm                  [facility]

 

             Body weight  2021 18:19:00 108.863 kg                [facility]

 

             BMI          2021 18:19:00 30.00 kg/m2               [facility]

 

             WEIGHT       2021 16:00:00 108.5 kg                  

 

             WEIGHT       2021 05:30:00 117 kg                    

 

             WEIGHT       2021 04:21:00 116.6 kg                  

 

             WEIGHT       2021 02:38:00 115 kg                    

 

             WEIGHT       2021 04:00:00 112.2 kg                  

 

             HEIGHT       2021 22:00:00 190.5 cm                  

 

             WEIGHT       2021 22:00:00 112.49 kg                 

 

             Systolic blood 2020 15:59:00 108 mm[Hg]                [facility]

[facility]

 

             Diastolic blood 2020 15:59:00 76 mm[Hg]                 [facility]

 

             Heart rate   2020 15:59:00 90 /min                   [facility]

ty of



                                                                 [facility]



                                                                 [facility]

 

             Body temperature 2020 15:59:00 36.44 Katlyn                 Univ

ersity of



                                                                 [facility]



                                                                 [facility]

 

             Respiratory rate 2020 15:59:00 18 /min                   Univ

ersity of



                                                                 [facility]



                                                                 [facility]anch

 

             Body height  2020 15:59:00 193 cm                    [facility]

ty of



                                                                 [facility]



                                                                 [facility]

 

             Body weight  2020 15:59:00 116.484 kg                [facility]

ty of



                                                                 [facility]



                                                                 [facility]

 

             BMI          2020 15:59:00 31.26 kg/m2               [facility]

ty of



                                                                 [facility]



                                                                 Branch

 

             Oxygen saturation in 2020 15:59:00 97 /min                   

[facility]



             Arterial blood by                                        [facility]

[facility]



             Pulse oximetry                                        Branch

 

             Systolic blood 2020 15:59:00 108 mm[Hg]                Univer

sity of



             pressure                                            [facility]



                                                                 [facility]

 

             Diastolic blood 2020 15:59:00 76 mm[Hg]                 Unive

rsity of



             pressure                                            [facility]

 

             Heart rate   2020 15:59:00 90 /min                   [facility]

ty of



                                                                 [facility]



                                                                 Branch

 

             Body temperature 2020 15:59:00 36.44 Katlyn                 Univ

ersity of



                                                                 [facility]



                                                                 Branch

 

             Respiratory rate 2020 15:59:00 18 /min                   Univ

ersity of



                                                                 [facility]



                                                                 Branch

 

             Body height  2020 15:59:00 193 cm                    [facility]

ty of



                                                                 [facility]



                                                                 Branch

 

             Body weight  2020 15:59:00 116.484 kg                [facility]

ty of



                                                                 [facility]



                                                                 Branch

 

             BMI          2020 15:59:00 31.26 kg/m2               [facility]

ty of



                                                                 [facility]



                                                                 Branch

 

             Oxygen saturation in 2020 15:59:00 97 /min                   

[facility]



             Arterial blood by                                        [facility]

[facility]



             Pulse oximetry                                        Branch

 

             Systolic blood 2023 18:00:00 108 mm[Hg]                [facility]

 [facility]

 

             Diastolic blood 2023 18:00:00 78 mm[Hg]                 [facility]

t [facility]

 

             Heart rate   2023 18:00:00 82 /min                   [facility]

[facility]

 

             Respiratory rate 2023 18:00:00 22 /min                   [facility]

 

             Oxygen saturation in 2023 18:00:00 96 /min                   

[facility]



             Arterial blood by                                        Medical Ce

nter



             Pulse oximetry                                        

 

             Body temperature 2023 16:00:00 36.83 Katlyn                 [facility]

 

             Body weight  2023 08:08:00 115.7 kg                  [facility]

 

             BMI          2023 08:08:00 31.88 kg/m2               [facility]

 

             Height       2021 13:05:00 190.5 cm                  [facility]

 [place]

 

             Weight       2021 13:05:00                           [facility]

 [place]

 

             BMI Calculated 2021 13:05:00                           [place]

[facility]







Procedures







                Procedure       Date / Time     Performing Clinician Source



                                Performed                       

 

                2D ECHO W/ DOPPLER 2023 15:56:24 [facility]



                (CW/PW/COLOR)                                   [place]

 

                VITAMIN B12     2023 15:20:00 [facility]

 

                HAPTOGLOBIN     2023 15:20:00 [facility] Pilo [facility]

 

                ECG 12-LEAD     2023 11:06:46 BadjatiyaAman [facility]

 

                ECG 12-LEAD     2023 11:06:46 Unknown, [facility]

 

                SARS-COV2/RT-PCR ([facility] & 2023 08:18:00 Rosie [facility]



                REF LABS)                                       [place]

 

                ECG 12-LEAD     2023 07:59:12 Unknown, [facility]

 

                ECG 12-LEAD     2023 07:59:12 Unknown, [facility]

 

                L[facility]     [facility]23 [facility]22[facility]00 [facility]

 

                HEMO[facility]C  [facility]23 [facility]22[facility]

 

                PHOSPHORUS      2023 04:22:[facility]

 

                CBC W/PLT COUNT & AUTO 2023 04:22:00 [facility]



                DIFFERENTIAL                    Jose       Center

 

                BASIC METABOLIC PANEL 2023 04:22:00 [facility]

 

                MAGNESIUM       2023 04:22:00 [facility]

 

                PROTHROMBIN TIME/INR 2023 04:22:00 Rosie [facility]

 

                PERIPHERAL BLOOD SMEAR - 2023 04:22:00 Rosie, [facility]



                PATHOLOGIST REVIEW                                 Center

 

                IRON, TIBC, % SAT. 2023 04:22:00 Rosie, [facility]



                (WITHOUT FERRITIN)                                 Center

 

                FERRITIN        2023 04:22:00 Rosie, [facility]

 

                HC LAB HIV-1 AG 2023 [facility]:22[facility]00 [facility]



                W/HIV-1&2 AB                                    Center

 

                HEPATIC FUNCTION PANEL 2023 04:22:00 Pilo Zimmerman [facility]

 

                HEPATITIS B PANEL 2023 04:22:00 [facility] Pilo [facility]

 

                HEPATITIS C ANTIBODY 2023 04:22:00 Pilo Zimmerman

[facility]

 

                LACTATE DEHYDROGENASE 2023 04:22:00 [facility]



                (LDH)                                           Center

 

                RETICULOCYTE COUNT 2023 04:22:00 Pilo Zimmerman [facility]

 

                PERIPHERAL BLOOD SMEAR - 2023 04:22:00 Pilo Zimmerman [facility]



                HOLD ONLY                                       Center

 

                HBSAG CONFIRMATION 2023 04:22:00 ZimmermanPilo [facility]

 

                FLOW CYTOMETRY  2023 04:22:00 Fidencio Gordon [facility]



                REQUISITION                                     Center

 

                FLOW CYTOMETRY  2023 04:22:00 Heidi Fidencio Corey [facility]

 

                CBC W/PLT COUNT & AUTO 2023 04:22:00 Celli, Zach    [facility]

 

                BASIC METABOLIC PANEL 2023 21:59:00 Juan Manuel Burnette [facility]

I [facility]

 

                MAGNESIUM       2023 21:59:00 Juan Manuel Burnette [facility]

 

                TSH             2023 17:10:00 Zach Mazariegos    [facility]

 

                EKG-SCANNED     2023 00:00:00 Provider, Default [facility]



                                                Scanning        [place]

 

                ASSIGNMENT OF BENEFITS 2022 23:58:36 Doctor Unassigned, No

 [facility]







Plan of Care







             Planned Activity Planned Date Details      Comments     Source

 

             Future Scheduled 2026   Lipid panel               [facility]

s



             Test         00:00:00     (procedure) [code =              [facility]



                                       [id number]]                 

 

             Future Scheduled 2023   COVID-19 VACCINE (#1)              [facility]



             Test         11:35:40     [code = COVID-19              



                                       VACCINE (#1)]              

 

             Future Scheduled 2023   Hepatitis C screening              [facility]



             Test         11:35:40     (procedure) [code =              



                                       025024037]                

 

             Future Scheduled 2023   COLONOSCOPY SCREENING              [facility]



             Test         11:35:40     [code = COLONOSCOPY              



                                       SCREENING]                

 

             Future Scheduled 2023   SHINGLES VACCINES (1              Met

[facility]



             Test         11:35:40     of 2) [code = SHINGLES              



                                       VACCINES (1 of 2)]              

 

             Future Scheduled 2023   INFLUENZA VACCINE              [facility]



             Test         11:35:40     [code = INFLUENZA              



                                       VACCINE]                  

 

             Future Scheduled 2023   DEPRESSION SCREENING              [facility]



             Test         00:00:00     (12+) [code =              Medical Center



                                       DEPRESSION SCREENING              



                                       (12+)]                    

 

             Future Scheduled 2023   DEPRESSION SCREENING              [facility]



             Test         00:00:00     (12+) [code =              Medical Center



                                       DEPRESSION SCREENING              



                                       (12+)]                    

 

             Future Scheduled 2022   INFLUENZA VACCINE              [facility]



             Test         23:23:39     [code = INFLUENZA              



                                       VACCINE]                  

 

             Future Scheduled 2022   COVID-19 VACCINE (#1)              [facility]



             Test         23:23:39     [code = COVID-19              



                                       VACCINE (#1)]              

 

             Future Scheduled 2022   Hepatitis C screening              [facility]

[facility]



             Test         23:23:39     (procedure) [code =              



                                       450358933]                

 

             Future Scheduled 2022   COLONOSCOPY SCREENING              [facility]



             Test         23:23:39     [code = COLONOSCOPY              



                                       SCREENING]                

 

             Future Scheduled 2022   SHINGLES VACCINES (1              Met

[facility]



             Test         23:23:39     of 2) [code = SHINGLES              



                                       VACCINES (1 of 2)]              

 

             Future Scheduled 2022   INFLUENZA VACCINE (#1)              [facility]

[facility]



             Test         00:00:00     [code = INFLUENZA              Medical Ce

nter



                                       VACCINE (#1)]              

 

             Future Scheduled 2022   INFLUENZA VACCINE (#1)              [facility]

[facility]



             Test         00:00:00     [code = INFLUENZA              Medical Ce

nter



                                       VACCINE (#1)]              

 

             Future Scheduled 2021-05-10   Screening for              [facility] of



             Test         00:03:44     malignant neoplasm of              Medici

ne



                                       colon (procedure)              



                                       [code = 896497481]              

 

             Future Scheduled 2021-05-10   TETANUS SHOT (ADULT)              [facility] of



             Test         00:03:44     [code = TETANUS SHOT              Medicin

e



                                       (ADULT)]                  

 

             Future Scheduled 2021-05-10   COVID-19 Vaccine (1)              [facility] of



             Test         00:03:44     [code = COVID-19              Medicine



                                       Vaccine (1)]              

 

             Future Scheduled 2021-05-10   ZOSTER VACCINE (1 of              [facility]



             Test         00:03:44     2) [code = ZOSTER              Medicine



                                       VACCINE (1 of 2)]              

 

             Future Scheduled 2021-05-10   FLU VACCINE > 6 MONTHS              B

[facility] of



             Test         00:03:44     [code = FLU VACCINE >              Medici

ne



                                       6 MONTHS]                 

 

             Future Scheduled 2021-05-10   BMI FOLLOW UP PLAN              [facility] of



             Test         00:03:44     [code = BMI FOLLOW UP              Medici

ne



                                       PLAN]                     

 

             Future Scheduled 2017   SHINGLES VACCINES (1              [facility]



             Test         00:00:00     of 2) [code = SHINGLES              Medic

al Center



                                       VACCINES (1 of 2)]              

 

             Future Scheduled 2017   SHINGLES VACCINES (1              [facility]



             Test         00:00:00     of 2) [code = SHINGLES              Medic

al Center



                                       VACCINES (1 of 2)]              

 

             Future Scheduled 2002   Lipid panel               [facility]

s



             Test         00:00:00     (procedure) [code =              Medical 

Center



                                       [id number]]                 

 

             Future Scheduled 1986   DTAP/TDAP/TD VACCINES              [facility]

[facility]



             Test         00:00:00     (1 - Tdap) [code =              Medical C

enter



                                       DTAP/TDAP/TD VACCINES              



                                       (1 - Tdap)]               

 

             Future Scheduled 1986   DTAP/TDAP/TD VACCINES              [facility]

[facility]



             Test         00:00:00     (1 - Tdap) [code =              Medical C

enter



                                       DTAP/TDAP/TD VACCINES              



                                       (1 - Tdap)]               

 

             Future Scheduled 1979   Tobacco Cessation              [facility]



             Test         00:00:00     Counseling and              Medical Cente

r



                                       Screening (12+) [code              



                                       = Tobacco Cessation              



                                       Counseling and              



                                       Screening (12+)]              

 

             Future Scheduled 1979   Tobacco Cessation              [facility]



             Test         00:00:00     Counseling and              Medical Cente

r



                                       Screening (12+) [code              



                                       = Tobacco Cessation              



                                       Counseling and              



                                       Screening (12+)]              

 

             Future Scheduled 1973   PNEUMOCOCCAL VACCINE              [facility]



             Test         00:00:00     0-64 YRS (1 - PCV)              Medical C

enter



                                       [code = PNEUMOCOCCAL              



                                       VACCINE 0-64 YRS (1 -              



                                       PCV)]                     

 

             Future Scheduled 1973   PNEUMOCOCCAL VACCINE              [facility]



             Test         00:00:00     0-64 YRS (1 - PCV)              Medical C

enter



                                       [code = PNEUMOCOCCAL              



                                       VACCINE 0-64 YRS (1 -              



                                       PCV)]                     

 

             Future Scheduled 1967   COVID-19 VACCINE (#1)              [facility]



             Test         00:00:00     [code = COVID-19              [facility]

ter



                                       VACCINE (#1)]              

 

             Future Scheduled 1967   COVID-19 VACCINE (#1)              [facility]



             Test         00:00:00     [code = COVID-19              [facility]

ter



                                       VACCINE (#1)]              

 

             Future Scheduled 1967   CT [facility]              [facility]



             Test         00:00:00     (combo) [code = CT              Medical C

enter



                                       Colonography (combo)]              

 

             Future Scheduled 1967   Screening for              [facility]

es



             Test         00:00:00     malignant neoplasm of              [facility]



                                       colon (procedure)              



                                       [code = 275595776]              

 

             Future Scheduled 1967   Screening for              [facility]

es



             Test         00:00:00     malignant neoplasm of              [facility]



                                       colon (procedure)              



                                       [code = 680735978]              

 

             Future Scheduled 1967   Screening for              [facility]

es



             Test         00:00:00     malignant neoplasm of              [facility]

[facility]



                                       colon (procedure)              



                                       [code = 943066474]              

 

             Future Scheduled 1967   Screening for              [facility]

es



             Test         00:00:00     malignant neoplasm of              [facility]

[facility]



                                       colon (procedure)              



                                       [code = 268127025]              

 

             Future Scheduled 1967   Sigmoidoscopy [code =              [facility]



             Test         00:00:00     Sigmoidoscopy]              [facility]

r

 

             Future Scheduled 1967   CT [facility]              [facility]



             Test         00:00:00     (combo) [code = CT              Medical C

enter



                                       Colonography (combo)]              

 

             Future Scheduled 1967   Screening for              [facility]

es



             Test         00:00:00     malignant neoplasm of              [facility]

[facility] Center



                                       colon (procedure)              



                                       [code = 533768981]              

 

             Future Scheduled 1967   Screening for              [facility]

es



             Test         00:00:00     malignant neoplasm of              [facility]

[facility]



                                       colon (procedure)              



                                       [code = 288712733]              

 

             Future Scheduled 1967   Screening for              [facility]

es



             Test         00:00:00     malignant neoplasm of              [facility]

l Center



                                       colon (procedure)              



                                       [code = 891014609]              

 

             Future Scheduled 1967   Screening for              [facility]

es



             Test         00:00:00     malignant neoplasm of              [facility]

[facility] Center



                                       colon (procedure)              



                                       [code = 038404689]              

 

             Future Scheduled 1967   Sigmoidoscopy [code =              [facility]



             Test         00:00:00     Sigmoidoscopy]              [facility] Cent[facility]

r







Encounters







        Start   End     Encounter Admission Attending Care    Care    Encounter 

Source



        Date/Time Date/Time Type    Type    Clinicians Facility Department ID   

   

 

        2021         Inpatient ER      [facility]  [facility]    [place] [id number]

[id number] [facility]



        21:41:00                         SEAN keys               

 

        2023 Anesthesia         Flynn Nugent [facility]   [id number] 

[id number] [facility]



        23:59:59 23:59:59 Event           REBA                               [facility]

 

        2023 Inpatient ER      AMADOU [place] [facility]    General Med 

[id number] [facility]



        16:12:00 19:00:00                                                 

 

        2023 [facility]         Amadou Nj [facility]   [id number] 

[id number] [facility]



        16:12:00 19:00:00 Encounter         REBA                               [facility]

 

        2023 Orders                  [facility]   [id number]

[id number] [facility]



        00:00:00 00:00:00 Only                                            [facility]

 

        2022 Laboratory         Only, Ang Db Test [facility]    1.2.8

40.114 [id number] 

Univers



        18:30:00 18:45:00 Only            [facility]  [id number] 

        ity [facility] [id number]         Dave

as



                                                YAMILE?BLEA [phone]         [address]



                                                MEDICAL                 



                                                OFFICE                  



                                                BUILDING                 

 

        2022 Outpatient R       KARL [facility]    [id number]

[id number] Univers



        18:30:00 18:16:27                 [facility]                         ity [facility]

 

        2022 Orders          Doctor MONTENEGRO    [id number]

74 Univers



        00:00:00 00:00:00 Only            Unassigned, MARISABEL   [id number]       

  ity of



                                        [facility] 4.2.7.2.686         Dave

as



                                                        [phone]         [address]

 

        2021 Outpatient                 BCM     [facility]     [id number]

8 [facility]



        00:00:00 23:59:00                                                 Colleg

e



                                                                        of



                                                                        Medicin



                                                                        e

 

        2021 Outpatient         JAYDEN  [facility]     [id number]

[address] [place]



        00:00:00 00:00:00                 PREMAL                  517     Method

i



                                                                        st

 

        2021 Outpatient                 BCM     [facility]     [id number]

0 [facility]



        11:45:00 23:59:00                                                 Colleg

e



                                                                        of



                                                                        Medicin



                                                                        e

 

        2021 Outpatient                 [facility] 4902

[id number] [facility]



        13:03:00 14:50:00                         bertrand Lutz 00      l



                                                        [facility]         

 

        2021 Outpatient                 [facility] 4902

[id number] Memoria



        13:03:00 14:50:00                         r       [place] 00      l



                                                        [facility]         

 

        2021 Outpatient         MARIBETH JADE    MED     750

0    [facility]



        08:03:00 09:50:00                 OLENA                           

 

        2021 Outpatient         KOTA Jade    [facility]    [id number]

[id number] 



        08:03:00 09:50:00                 Olena                   00      



                                        Celia                         

 

        2021 Office          LASHONDA HEDRICK     1.2.840.114 [id number]

60 [facility]



        12:00:33 15:12:31 Visit           SEAN   AMBULATOR 350.1.13.21         

College



                                                Y       0.2.7.2.686         of



                                                        [phone]         Medi

merrill



                                                        810             e

 

        2021 Outpatient         RAFY SLEAZIZA    [facility]    [id number]

[id number] [facility]



        00:00:00 00:00:00                 TIM                           

 

        2020 Urgent          Pob1, Acute [facility]    1.2.840.114 [id number]

[id number] 



        10:46:27 11:06:27 [facility]  350.1.13.10       

  



                                                [place] 4.2.7.2.686         



                                                [facility] [phone]         



                                                [place] [address]             



                                                Office                  



                                                Building                 



                                                One                     

 

        2020 Urgent          Pob1, Acute Care [facility]    1.

2.840.114 [id number] 

[facility]



        10:46:27 11:06:27 [facility]            [facility]  350.1.13.10    

     ity [facility] [id number]         Dave

as



                                                [facility] [phone]         Me

dical



                                                [address]



                                                Office                  



                                                Building                 



                                                One                     

 

        2020 [facility]    [id number]

635 [facility]



        10:40:00 10:40:00                 [facility]







Results







           Test Description Test Time  Test Comments Results    Result Comments 

Source









                    FLOW CYTOMETRY REQUISITION 2023 10:27:52 









                      Test Item  Value      Reference Range Interpretation Comme

nts









             FLOW CYTOMETRY RESULT POINTER (JOSE G) (test code = 2758) See Separ

ate Report                           

 

             FLOW CYTOMETRY AP CASE # (JOSE G) (test code = 2759) [id number]     

                         



2D Echo W/Doppler(CW/PW/Color)2023 12:03:15Ejection FractionSLEH ECHO 
HEARTLAB MKCKESSON [facility] [id number] 
17:30:32





             Test Item    Value        Reference Range Interpretation Comments

 

             Case Report (test code = Flow Cytometry                           



             104)         Report Case:                           



                          [id number]                              



                          Authorizing                            



                          Provider: Fidencio Gordon MD                            



                          Collected:                             



                          2023 04:22 AM                           



                          Ordering Location:                           



                          [place] [address] cc                           



                          Received: 2023                           



                          12:38 PM                               



                          Pathologist: Barbara Velazquez MD Specimen: Other                           

 

             Flow Interpretation (test r9cypJAaCWDanYI6WwLg                     

      



             code = 3364) VAXux3jvv7DfdTXxoGTh                           



                          ADubyQIibxZtvc92pIN3                           



                          dX02HN5jBATjVoP7MXWk                           



                          srX1Bhp9IYWeQMFnuLSa                           



                          F265y0eja7boxsNybGB9                           



                          bHenLGFazvvbVcN5TIbt                           



                          ZENjkkesXAz3XOhyXVDh                           



                          wCA7RKRaaATwD5RwDXKy                           



                          GP0wiys0SOX0XOrrXTYd                           



                          IwB4EQDfmVWfQLUjaFfa                           



                          QQzty833WFG1PmIrORCi                           



                          bjUwwZibvH4iTnUvNAWA                           



                          HOWDRRlWLxTCLLQLM81H                           



                          VMHTHP5ZCZCSXC4MHCYA                           



                          PPhdzBMlMU7pVr4hFZ3U                           



                          S1HZOHaQHGLpM4CZRLPB                           



                          K1WVTRRPNK6LPOyRCP0J                           



                          GQKDYOHesTMzGX5wBt7r                           



                          QUJFUlJBTlQgVCBDRUxM                           



                          XVLZYGKEFSBHI01eJQEF                           



                          TlRJRklFRFxwYXIgLSBO                           



                          EqIHA9DHVNMRAD1LOR7H                           



                          IEJMQVNUUyBJREVOVElG                           



                          SUVEXHBhcn0=                           

 

             CPT Code(s) (test code = m9hurNNtBNDydWZ9TmOm                      

     



             3357)        KDNne3kkq0ExhFQksIBm                           



                          ALaysRYaaoUsch46dYM6                           



                          hI30MV3kQDPfXhD6OKRu                           



                          uiV8Rba6COQvOOWqwVWx                           



                          R137n9gqh3cujcOueVY5                           



                          mWutZLNmjlqjOtG3IIui                           



                          MTGpicwgLQp1VEjaJDBn                           



                          sDR1LUTndNEhA2ZkOGTs                           



                          IR0bmkg0WCL7MQkuZSNf                           



                          DzB5OTPgqSHpDZDqkXjw                           



                          OCiof936HWS5FfZdGUFg                           



                          t6A7zzXjHuVfBKAtyKH0                           



                          ipE2AZGhYO9cbebsy3ns                           



                          MYyqLGpeFQFloiF6twH7                           



                          ZNExiHBkJ4RusZ8oQYGf                           



                          LC8qxuruo5uxETP9DGwb                           



                          YXJkXHBsYWluXGZzMjIg                           



                          ODgxODlccGFyfQ==                           

 

             CLINICAL HISTORY (test x3sjaGVnCMUjwRB8PbAs                        

   



             code = 3356) AGDal0dxt8RuaIBdhGHd                           



                          TJqbqUAlepSrta44wUL7                           



                          aJ02PZ5sCPHhIvR0MXLb                           



                          quB1Fbo0NTPnRJFmmGAc                           



                          G270g5xco4paibEmpQD2                           



                          wHuqXRSdqkkjAxM2EOyp                           



                          AXMbdiplJEb1JCikBPGz                           



                          fHR3TCNkoQOmH2CeDCIv                           



                          UQ5haqi8CAT9JRwjIIIw                           



                          ZwT1NHEpaPWcXSOajBad                           



                          GAzuk187NJY1CzFrBFWn                           



                          fiHlnGycoU4dXwJlOOAU                           



                          pOCrkmusAWSktg8wGl4d                           



                          eXRvcGVuaWFccGFyfQ==                           

 

             SPECIMEN SOURCE (test a5usySZlMRYuvMG4UuVx                         

  



             code = 3377) RHZok8thy3BysVJhbPJf                           



                          TUhjrSEkptIflj83jRI8                           



                          eE87EG8fSDXfFoL8FJJm                           



                          kmK5Pjb0TBWmTIIeaHRx                           



                          G581d5lad0fxycHzyTR4                           



                          jTwqMTWodwggLgB2RXcm                           



                          VDPnflbtPNz0MSxdPGVk                           



                          jTV9DLBuqWGgZ5WsCQKm                           



                          RM1bcng5RCX9YCafQDYq                           



                          ZpK9CLZzqSSzWEDiaGct                           



                          WPtix169DEG3SbLmHDWd                           



                          fbUyiQaqnH3lKiGdVIGJ                           



                          DNTthBgomaTiMDEsb30p                           



                          XHBhcn0=                               

 

             CELLULAR BIOMARKER r4kdyPSoITMteWJ9SdGt                           



             ANALYSIS (test code = JIKek7enl3ClqVQsrBGs                         

  



             3380)        LFzkbRMgtyAfxy82mOG3                           



                          sQ26QU7zUEDfRuV5WBLo                           



                          zhM2Kwi3YMOgFZPnhBXa                           



                          R553a0byg6jdlwKneLO9                           



                          PCQvGITaC9EfNL0zTANs                           



                          qKHcT10pmXRpBDO0PEMn                           



                          HQOjtAPzBPZjZQB1PKHq                           



                          zCUqM8etZLQwXZ5fuflr                           



                          DDleSBwfVZYtcQM7NIYj                           



                          iEIgD6NxZXHdGKgiYBBb                           



                          wgo6FlAfGk5dkYLbyVzx                           



                          MFxwYXJkXHBsYWluXGZz                           



                          ZaEeJ6FxEAHYSMevl9Rz                           



                          ZvCwZX5GIUWrSMoxX4G7                           



                          Qdagh7JeAhOhVX6EVI2e                           



                          KYYeHGHMVHybB4VzNExz                           



                          A5UoRMunH2KsGMVEZDDo                           



                          LCBDRDQsIENENDUsIENE                           



                          MTQsIENEMTMsIENEMzMs                           



                          KLNDMGQ3PSUGAHL4XNIz                           



                          cn0=                                   

 

             IMMUNOPHENOTYPIC FINDINGS j3zcvHLmYKIfaVB8PvRh                     

      



             (test code = 3379) MHAoj5rll3DcnZAoqTAd                           



                          IDqueGUsknZjkh21jGL5                           



                          fU55ZH7jCHXzVuD0TTJp                           



                          qdZ7Tjc0NBNsXVWjvFLd                           



                          W547h7gea7nvhiJluCV5                           



                          JCDmTCHtM1NvTC5nRFId                           



                          lDBxF35tuSScTBT7LKTu                           



                          MIMdvDQiDTPwOAG2REYd                           



                          mSYdO6ynLYCpKV0txsay                           



                          ORamLKvmYWRtuFV8MXQh                           



                          mRAxR9DuHMHuDWgsVLCf                           



                          quy7KaEaSe4uqIMtwPca                           



                          MFxwYXJkXHBsYWluXGZz                           



                          NsYpP8RqLLIwCROdyOGf                           



                          UBSdZANmyKb9kSydZPis                           



                          NyVcflx+TS3euwi+IE51                           



                          bWJlciBvZiBFdmVudHMg                           



                          QQAjjYcbFZV2TRA6SHY4                           



                          XHBhclx+XHBhciBUaGUg                           



                          Ur2xlW88bF8vKTVksAOm                           



                          KZQwx32xVMMlIRRjRSJo                           



                          dGlmaWVkOlxwYXJcflxw                           



                          YKEtUIgdhBaxO4t7HDN3                           



                          AJQqcZoljFIGZPO0GzEe                           



                          rK1slF9tdIGmerBpj32j                           



                          bbxuXWB7Jc74WHCsTjP0                           



                          k8DabVRfVKepjf0aZMVk                           



                          WSuoncHqnN47GZVrF5R5                           



                          OkNEOCByYXRpbyBvZiBc                           



                          X1OuRRQuZVjcTqOqTLZd                           



                          QVRmc1AvHXgdHUbnwnKi                           



                          w2fqgfAaFtS8vDDqkURy                           



                          LIPaT9XvhBRhacWwZ5Am                           



                          deVLGFSrAE4zNVQAEK2s                           



                          IC0FOFKfgQajMGDuMUGs                           



                          w5h8nUaktLWil9v4sIGj                           



                          IGthcHBhOmxhbWJkYSBy                           



                          YXRpbyBvZiAxLjcuXHBh                           



                          clx+HWItdyXBdHYlm6au                           



                          Y07due3kuYPwAiVnp7S6                           



                          sIX6pQ7muwktBBZalMBu                           



                          bnRpZmllZCBieSBDRDQ1                           



                          ZRUqGYEmzKtfgTNqE7X2                           



                          tBZbGWVjMZQsD0Ducdvy                           



                          mCatfggrJ0XkmpDgl3M2                           



                          dGVzIGNvbXByaXNlIDMy                           



                          MjpgEZ6qZLXjxVjzOCEv                           



                          ILi6gjSmGRKlodAgA5Sv                           



                          QGcqeI0ft2G5tILyDZTe                           



                          bXByaXNlIFxjZjAgMTEu                           



                          MBioXaXlIOYjDpQ3p8Yz                           



                          nVXuHAdzhj5xeITvRS2s                           



                          iOVwRUWzSRDdGE7yeL8p                           



                          bmcgZXZlbnRzIGFuYWx5                           



                          auQfSDHkrNOmu0ZinWYb                           



                          u670yZKfjWIxC4LukXKs                           



                          UC7yie3bIF8oqA3xsQ1l                           



                          hQ0bHGFoIKfiwbgoRV1r                           



                          BDXyVqUqrr5wmRErqN==                           

 

             DISCLAIMER (test code = o0tyjLTvQWBymAI8LkHf                       

    



             0313)        JZBwj7nub5TewAEwwTHs                           



                          RCzcaVKphtHvvt25xPA6                           



                          sB02AN9mXWJoZdG4LPOz                           



                          wqM3Caz7CPIyMFJfjHMl                           



                          J782a5yfl8neluEfdQI2                           



                          cAhhYRJbtxecByY8HHyb                           



                          EIEdvqprCCu8BOqvORVb                           



                          xLN0GLRnaNHsP2LqIXIq                           



                          OD2hmyl4SIS5CTjyJPSm                           



                          QtW2BBUteYHbUYEiuFms                           



                          WRpko036UVP8FsIkFJZr                           



                          yuVlhPkzkW1iKwKgJvAQ                           



                          eVIjXSV1ZNF1vqM4XACl                           



                          NMJnrsFhs3JvAHFevzNz                           



                          qDkdoYLexEIcOa7slSRv                           



                          D8AtC2harsBvmJQffCD7                           



                          aWNzIGRldGVybWluZWQg                           



                          GwaeReY5fL1nQIJ0DwZT                           



                          oTxvY2PyLBDaaMNpiNNH                           



                          NO19UVMvmRVxHGXbMYfl                           



                          pHK0ATVnz9EbIjDrfeHx                           



                          uDWuemYjAB7iSHIcxFZt                           



                          kvVbXPQ8HJAvXNOUYuKv                           



                          RNPsw2HdBW8sXGQfoTre                           



                          PVUufS8ky6YsKPKkr15v                           



                          IFRoZSBGREEgaGFzIGRl                           



                          dGVybWluZWQgdGhhdCBz                           



                          sPFzJXLeAAOsFY2xWHQe                           



                          ozDigPTvq6NvdUYoxfNq                           



                          f7DwhqApOHKjHYL1TgWG                           



                          kLJhqU55lCSfmr34TFIx                           



                          PGMoV6GbPJTdWCWqBNve                           



                          azXghHksOMPfm07onMEy                           



                          fpHtl7HhdtFvWCHhL8ji                           



                          VFIajTRobTYhd1WkgP9u                           



                          lUJaioWtRNF1iDNjICOs                           



                          fG3tOWGfkMlsKWRvuJ1f                           



                          O3OcGZfhIu3bABEcnohz                           



                          AL4vco06WX8xldDsWT1y                           



                          ekQoLN75ndKjCeYdMKa6                           



                          QKfrQ1qUPHOnVGRpVSW2                           



                          WBvyQkjmRFA3lmXvBWMw                           



                          u6YbVQrkR2evG97xpAmh                           



                          hDc3jRKygXfteTGviQO2                           



                          AGS6lQ0cImksBIJ3                           

 

             Technical component was [facility]'s                          

 



             performed at (test code = [facility],                          

 



             2778)        Department of                           



                          Pathology, [address] [place] [address],                           



                          Tel [phone]                           

 

             Professional component [facility]'s                           



             was performed at ([facility],                           



             code = 2779) Department of                           



                          Pathology, [address] [place] [address],                           



                          Tel [phone]                           



[facility] campusFLOW [id number] 17:30:32Flow Cytometry 
Report Case: [id number] Authorizing Provider: Fidencio Gordon MD Collected: 
2023 04:22 AM Ordering Location: [place] [address] ccu Received: 2023 
12:38 PM Pathologist: Barbara Velazquez MD Specimen: Other  PERIPHERAL
BLOOD, FLOW CYTOMETRY:- NO MONOTYPIC B CELL POPULATION IDENTIFIED- NO ABERRANT T
CELL POPULATION IDENTIFIED- NO POPULATION OF BLASTS IDENTIFIEDElectronically 
signed by Barbara Velazquez MD on 2023 at 5:30 [id number] 
thrombocytopeniaPeripheral bloodCD8, surface-Kappa, CD56, surface-Lambda, CD5, 
CD19, CD10, CD3, CD20, CD4, CD45, CD14, CD13, CD33, , HY09Pwhmpsvz 
Viability: 98.7% Number of Events Acquired: 11638 The following populations are 
identified: Lymphocytes: Bright CD45+ lymphocytes comprise 53.9% of total cells.
T cells show a CD4:CD8 ratio of 1.5 and normal expression of the pan T cell 
antigens CD3 and CD5. B cells are polytypic with a kappa:lambda ratio of 1.7. 
Myeloid/monocytic populations: As identified by CD45 and light scatter 
characteristics, granulocytes comprise 32.7% of cells analyzed, and CD14+ 
monocytes comprise 11.8% of total cells. The remaining events analyzed represent
nonviable cells, non-hematolymphoid cells, and debris.These tests were developed
and their performance characteristics determined by [facility] have not been cleared or approved by the U.S. Food and Drug Adm
inistration. The FDA has determined that such clearance or approval is not 
necessary. It should not be regarded as investigational or for research. This 
laboratory is certified under the Clinical Laboratory Improvement Amendments of 
1988 ("CLIA") as qualified to perform high-complexity clinical testing.[facility], Department of Pathology, [address] [place] [address], Tel [phone], Department 
of Pathology, [address] [place] [address], Tel [phone] 
[id number] 16:54:45





             Test Item    Value        Reference Range Interpretation Comments

 

             VITAMIN B12 (BEAKER) (test code = 385 pg/mL    213-232 294)                                                



 ID - [id number] 15:57:34





             Test Item    Value        Reference Range Interpretation Comments

 

             HAPTOGLOBIN (BEAKER) (test code = 179 mg/dL                  

      



             366)                                                



 ID - BSHBSAG [id number] 14:39:32





             Test Item    Value        Reference Range Interpretation Comments

 

             HBSAG CONFIRMATION Confirmed Positive Not Confirmed A            



             (BEAKER) (test code =                                        



             1602)                                               



Specimen is confirmed positive for HBsAg.HEPATITIS B [id number] 12:12:38





             Test Item    Value        Reference Range Interpretation Comments

 

             HEPATITIS B CORE TOTAL ANTIBODY Nonreactive  Nonreactive           

    



             (BEAKER) (test code = 497)                                        

 

             HEPATITIS B SURFACE ANTIBODY < mIU/mL     <8.0                     

 



             (BEAKER) (test code = 647)                                        

 

             HEPATITIS B SURFACE ANTIGEN (2) Reactive     Nonreactive  A        

    



             (BEAKER) (test code = 2585)                                        



 ID - LISA BOperator ID - LISA BOperator ID - LISA BOperator ID - LISA B
PERIPHERAL BLOOD SMEAR - PATHOLOGIST [id number] 11:49:23





             Test Item    Value        Reference Range Interpretation Comments

 

             PERIPHERAL SMR REVIEW WBCs with predominance                       

    



             (BEAKER) (test code = of lymphocytes                           



             2640)        including occasional                           



                          atypical forms. Flow                           



                          cytometry pending.                           



                          Macrocytic anemia with                           



                          mild anisocytosis and                           



                          polychromasia.                           



                          Decreased platelets, no                           



                          significant                            



                          clumping/satellitism                           



                          seen.                                  

 

             [id number] Barbara Rashmi                           



             (BEAKER) (test code = ELÍAS Velazquez                           



             1183)                                               



PERIPHERAL BLOOD SMEAR - HOLD [id number] 11:07:57





             Test Item    Value        Reference Range Interpretation Comments

 

             PERIPHERAL SMEAR SAVE (BEAKER) (test save                          

         



             code = 1815)                                        



HIV-1 ANTIGEN WITH HIV-1/2 [id number] 10:58:46





             Test Item    Value        Reference Range Interpretation Comments

 

             HIV-1 ANTIGEN WITH HIV 1\T\2 Nonreactive  Nonreactive              

 



             ANTIBODY (2) (BEAKER) (test code                                   

     



             = 2586)                                             



 ID - LISA BHEPATITIS C [id number] 10:58:45





             Test Item    Value        Reference Range Interpretation Comments

 

             HEPATITIS C ANTIBODY (BEAKER) Nonreactive  Nonreactive             

  



             (test code = 367)                                        



 ID - LISA BRETICULOCYTE [id number] 10:23:39





             Test Item    Value        Reference Range Interpretation Comments

 

             RETICULOCYTE COUNT PCT (BEAKER) (test 3.8 %        0.5-1.8      H  

          



             code = 575)                                         



 ID - 6000Operator ID - 6000HEPATIC FUNCTION [id number] 10:14:12





             Test Item    Value        Reference Range Interpretation Comments

 

             TOTAL PROTEIN (BEAKER) (test code = 6.2 gm/dL    6.0-8.3           

        



             770)                                                

 

             ALBUMIN (BEAKER) (test code = 1145) 3.6 g/dL     3.5-5.0           

        

 

             BILIRUBIN TOTAL (BEAKER) (test code 0.4 mg/dL    0.2-1.2           

        



             = 377)                                              

 

             BILIRUBIN DIRECT (BEAKER) (test 0.2 mg/dL    0.1-0.5               

    



             code = 706)                                         

 

             ALKALINE PHOSPHATASE (BEAKER) (test 57 U/L                   

        



             code = 346)                                         

 

             AST (SGOT) (BEAKER) (test code = 20 U/L       5-34                 

     



             353)                                                

 

             ALT (SGPT) (BEAKER) (test code = 33 U/L       6-55                 

     



             347)                                                



 ID - MARCOLACTATE DEHYDROGENASE (LDH)2023 10:14:12





             Test Item    Value        Reference Range Interpretation Comments

 

             LACTATE DEHYDROGENASE (BEAKER) (test 178 U/L      125-220          

         



             code = 635)                                         



 ID - MARCOSARS-CoV2/RT-PCR (Asymptomatic ONLY)2023 09:41:14





             Test Item    Value        Reference    Interpretation Comments



                                       Range                     

 

             SARS-COV2/RT-PCR Negative     Negative                  The SARS-Co

V-2



             (test code =                                        target nucleic



             66749-3)                                            acids are not



                                                                 detected in thi

s



                                                                 specimen. Negat

niranjan



                                                                 results do not



                                                                 preclude SARS-C

oV-2



                                                                 infection and



                                                                 should not be u

sed



                                                                 as the sole bas

is



                                                                 for patient



                                                                 management



                                                                 decisions. Nega

tive



                                                                 results must be



                                                                 combined with



                                                                 clinical



                                                                 observations,



                                                                 patient history

,



                                                                 and epidemiolog

ical



                                                                 information. A



                                                                 false negative



                                                                 result may occu

r if



                                                                 a specimen is



                                                                 improperly



                                                                 collected,



                                                                 transported or



                                                                 handled. This S

ARS



                                                                 CoV-2 test is a



                                                                 rapid, real-emily

e



                                                                 RT-PCR test



                                                                 intended for th

e



                                                                 qualitative



                                                                 detection of



                                                                 nucleic acid fr

om



                                                                 SARS-CoV-2 in a



                                                                 nasopharyngeal 

swab



                                                                 specimen colle

marisela



                                                                 from individual

s



                                                                 suspected of



                                                                 COVID-19 by the

ir



                                                                 healthcare



                                                                 provider.

 

             [employer] (test code = This test has been                           



             TASIA)         authorized by FDA                           



                          under an EUA for                           



                          use by authorized                           



                          laboratories. This                           



                          test is only                           



                          authorized for the                           



                          duration of the                           



                          declaration that                           



                          circumstances                           



                          exist justifying                           



                          the authorization                           



                          of emergency use                           



                          of in vitro                            



                          diagnostic tests                           



                          for detection                           



                          and/or diagnosis                           



                          of COVID-19 under                           



                          Section 564(b)(1)                           



                          of the Federal                           



                          Food, Drug and                           



                          Cosmetic Act, 21                           



                          U.S.C.                               



                          360bbb-3(b)(1),                           



                          unless the                             



                          authorization is                           



                          terminated or                           



                          revoked sooner.                           



                          Fact Sheet for                           



                          Healthcare                             



                          Providers:                             



                          https://www.[employer]/Documents/Xp                           



                          ert%20Xpress%20SAR                           



                          S%20CoV-2/Fact%20S                           



                          heets/302-3802%20S                           



                          ARS-COV-2%20HEALTH                           



                          CARE%20PROVIDERS%2                           



                          0FACT%20SHEET.pdf                           



                          Fact Sheet for                           



                          Healthcare                             



                          Patients:                              



                          https://www.[employer]/Documents/Xp                           



                          ert%20Xpress%20SAR                           



                          S%20CoV-2/Fact%20S                           



                          heets/302-3801%20S                           



                          ARS-COV-2%20PATIEN                           



                          T%20FACT%20SHEET.p                           



                          df                                     

 

             Lab Interpretation Normal                                 



             (test code =                                        



             14858-1)                                            



[facility]2023 
09:41:14





             Test Item    Value        Reference Range Interpretation Comments

 

             SARS-COV2/RT-PCR Negative     Negative                  The SARS-Co

V-2 target



             (test code =                                        nucleic acids a

re not



             1666190)                                            detected in thi

s specimen.



                                                                 Negative result

s do not



                                                                 preclude SARS-C

oV-2



                                                                 infection and s

hould not be



                                                                 used as the sol

e basis for



                                                                 patient managem

ent



                                                                 decisions. Nega

tive results



                                                                 must be combine

d with



                                                                 clinical observ

ations,



                                                                 patient history

, and



                                                                 epidemiological

 information.



                                                                 A false negativ

e result may



                                                                 occur if a spec

imen is



                                                                 improperly merlin

ected,



                                                                 transported or 

handled. This



                                                                 SARS CoV-2 test

 is a rapid,



                                                                 real-time RT-PC

R test



                                                                 intended for th

e qualitative



                                                                 detection of nu

cleic acid



                                                                 from SARS-CoV-2

 in a



                                                                 nasopharyngeal 

swab specimen



                                                                 collected from 

individuals



                                                                 suspected of CO

VID-19 by



                                                                 their healthcar

e provider.



This test has been authorized by FDA under an EUA for use by authorized 
laboratories. This test is only authorized for the duration of the declaration 
that circumstances exist justifying the authorization of emergency use of in 
vitro diagnostic tests for detection and/or diagnosis of COVID-19 under Section 
564(b)(1) of the Federal Food, Drug and Cosmetic Act, 21 U.S.C. 360bbb-3(b)(1), 
unless the authorization is terminated or revoked sooner. Fact Sheet for 
Healthcare Providers: https://www.[employer]
m/Documents/Xpert%20Xpress%20SARS%20CoV-2/Fact%20Sheets/302-3802%56SMFG-LFQ-6%20

HEALTHCARE%20PROVIDERS%20FACT%20SHEET.pdf Fact Sheet for Healthcare Patients: 
https://www.[employer]/Documents/Xpert%20Xp
ress%20SARS%20CoV-2/Fact%20Sheets/3023801%03ZZCH-IUZ-1%20PATIENT%20FACT%20SHEET

.pdfHEMOGLOBIN [id number] 09:26:40





             Test Item    Value        Reference Range Interpretation Comments

 

             HEMOGLOBIN A1C 5.7 %        See_Comment  H             [Automated m

essage]



             ELECTROPHORESIS (BEAKER)                                        The

 system which



             (test code = 3811)                                        generated

 this result



                                                                 transmitted ref

erence



                                                                 range: <=5.6%. 

The



                                                                 reference range

 was



                                                                 not used to int

erpret



                                                                 this result as



                                                                 normal/abnormal

.



"The A1c is measured using a [facility]-certified method. HbA1c value equal to or 
greater than 6.5% as thediagnosis cutoff for diabetes. An HbA1c value of 5.7-
6.4% indicates increased risk for diabetes (prediabetes)." ID - ADM
[id number] 05:22:44





             Test Item    Value        Reference Range Interpretation Comments

 

             FERRITIN (BEAKER) (test code = 163.62 ng/mL 5..00            

   



             361)                                                



 ID - [id number] 05:06:27





             Test Item    Value        Reference Range Interpretation Comments

 

             PHOSPHORUS (BEAKER) (test code = 3.8 mg/dL    2.3-4.7              

     



             604)                                                



 ID - ADMINLIPID [id number] 05:06:27





             Test Item    Value        Reference Range Interpretation Comments

 

             TRIGLYCERIDES (BEAKER) (test code = 100 mg/dL                      

        



             540)                                                

 

             CHOLESTEROL (BEAKER) (test code = 131 mg/dL                        

      



             631)                                                

 

             HDL CHOLESTEROL (BEAKER) (test code 30 mg/dL                       

        



             = 976)                                              

 

             LDL CHOLESTEROL CALCULATED (BEAKER) 81 mg/dL                       

        



             (test code = 633)                                        



Triglyceride Reference Range: Low Risk &lt;150 Borderline 150-199 High Risk 200-
499 Very High Risk &gt;=500Cholesterol Reference Range: Low Risk &lt;200 
Borderline 200-239 High Risk  &gt;240HDL Cholesterol Reference Range: Low Risk 
&gt;=60 High Risk &lt;40LDL Cholesterol Reference Range: Optimal &lt;100 Near 
Optimal 100-129 Borderline 130-159 High 160-189 Very High &gt;=190  ID -
ADMINBASIC METABOLIC [id number] 05:06:26





             Test Item    Value        Reference Range Interpretation Comments

 

             SODIUM (BEAKER) 137 meq/L    136-145                   



             (test code = 381)                                        

 

             POTASSIUM    4.4 meq/L    3.5-5.1                   



             (BEAKER) (test                                        



             code = 379)                                         

 

             CHLORIDE (BEAKER) 106 meq/L                        



             (test code = 382)                                        

 

             CO2 (BEAKER) 25 meq/L     22-29                     



             (test code = 355)                                        

 

             BLOOD UREA   14 mg/dL     7-21                      



             NITROGEN (BEAKER)                                        



             (test code = 354)                                        

 

             CREATININE   0.65 mg/dL   0.57-1.25                 



             (BEAKER) (test                                        



             code = 358)                                         

 

             GLUCOSE RANDOM 103 mg/dL                        



             (BEAKER) (test                                        



             code = 652)                                         

 

             CALCIUM (BEAKER) 8.5 mg/dL    8.4-10.2                  



             (test code = 697)                                        

 

             EGFR (BEAKER) 110                                     Interpretatio

n of eGFR



             (test code = mL/min/1.73                            values Stage De

scription



             1092)        sq m                                   Result G1 Cindy

l or high



                                                                 >=90 G2 Mildly 

decreased



                                                                 60-89 G3a Mildl

y to



                                                                 moderately 45-5

9 G3b



                                                                 Moderately to s

everely



                                                                 30-44 G4 Severl

y decreased



                                                                 15-29 G5 Kidney

 failure



                                                                 <15Reported eGF

R is based



                                                                 on the CKD-EPI 





                                                                 equation that d

oes not use



                                                                 a race



                                                                 coefficientEsti

mated GFR



                                                                 is not as accur

ate as



                                                                 Creatinine Brittney

fausto in



                                                                 predicting glom

erular



                                                                 filtration rate

. Estimated



                                                                 GFR is not appl

icable for



                                                                 dialysis patien

ts



 ID - [id number] 05:06:26





             Test Item    Value        Reference Range Interpretation Comments

 

             MAGNESIUM (BEAKER) (test code = 2.4 mg/dL    1.6-2.6               

    



             627)                                                



 ID - ADMINIRON, TIBC, % SAT. (WITHOUT FERRITIN)2023 05:02:06





             Test Item    Value        Reference Range Interpretation Comments

 

             IRON (BEAKER) (test code = 547) 55.0 ug/dL   40.0-160.0            

    

 

             TOTAL IRON BINDING CAPACITY 306 ug/dL    250-450                   



             (BEAKER) (test code = 769)                                        

 

             IRON % SATURATION (2) (BEAKER) 18 %         20-55        L         

   



             (test code = 2590)                                        



 ID - MARCOPROTHROMBIN TIME/[id number] 05:00:22





             Test Item    Value        Reference Range Interpretation Comments

 

             PROTIME (BEAKER) 14.7 seconds 11.9-14.2    H            



             (test code = 759)                                        

 

             INR (BEAKER) (test 1.17         See_Comment                [Automat

ed message]



             code = 370)                                         The system [employer]



                                                                 generated this 

result



                                                                 transmitted ref

erence



                                                                 range: <=5.90. 

The



                                                                 reference range

 was



                                                                 not used to int

erpret



                                                                 this result as



                                                                 normal/abnormal

.



RECOMMENDED COUMADIN/WARFARIN INR THERAPY RANGESSTANDARD DOSE: 2.0 - 3.0 
Includes: PROPHYLAXIS for venous thrombosis, systemic embolization; TREATMENT 
for venous thrombosis and/or pulmonary embolus.HIGH RISK: Target INR is 2.5-3.5 
for patients with mechanical heart valves.CBC W/PLT COUNT &amp; AUTO 
[id number] 04:56:38





             Test Item    Value        Reference Range Interpretation Comments

 

             WHITE BLOOD CELL COUNT (BEAKER) 5.0 K/ L     3.5-10.5              

    



             (test code = 775)                                        

 

             RED BLOOD CELL COUNT (BEAKER) 2.83 M/ L    4.63-6.08    L          

  



             (test code = 761)                                        

 

             HEMOGLOBIN (BEAKER) (test code = 9.3 GM/DL    13.7-17.5    L       

     



             410)                                                

 

             HEMATOCRIT (BEAKER) (test code = 30.5 %       40.1-51.0    L       

     



             411)                                                

 

             MEAN CORPUSCULAR VOLUME (BEAKER) 108 fL       79-92        H       

     



             (test code = 753)                                        

 

             MEAN CORPUSCULAR HEMOGLOBIN 32.9 pg      25.7-32.2    H            



             (BEAKER) (test code = 751)                                        

 

             MEAN CORPUSCULAR HEMOGLOBIN CONC 30.5 GM/DL   32.3-36.5    L       

     



             (BEAKER) (test code = 752)                                        

 

             RED CELL DISTRIBUTION WIDTH 15.9 %       11.6-14.4    H            



             (BEAKER) (test code = 412)                                        

 

             PLATELET COUNT (BEAKER) (test code 15 K/CU MM   150-450      L     

       



             = 756)                                              

 

             MEAN PLATELET VOLUME (BEAKER) 11.5 fL      9.4-12.4                

  



             (test code = 754)                                        

 

             NUCLEATED RED BLOOD CELLS (BEAKER) 0 /100 WBC   0-0                

       



             (test code = 413)                                        

 

             NEUTROPHILS RELATIVE PERCENT 38 %                                  

 



             (BEAKER) (test code = 429)                                        

 

             LYMPHOCYTES RELATIVE PERCENT 53 %                                  

 



             (BEAKER) (test code = 430)                                        

 

             MONOCYTES RELATIVE PERCENT 9 %                                    



             (BEAKER) (test code = 431)                                        

 

             EOSINOPHILS RELATIVE PERCENT 0 %                                   

 



             (BEAKER) (test code = 432)                                        

 

             BASOPHILS RELATIVE PERCENT 0 %                                    



             (BEAKER) (test code = 437)                                        

 

             NEUTROPHILS ABSOLUTE COUNT 1.89 K/ L    1.78-5.38                 



             (BEAKER) (test code = 670)                                        

 

             LYMPHOCYTES ABSOLUTE COUNT 2.68 K/ L    1.32-3.57                 



             (BEAKER) (test code = 414)                                        

 

             MONOCYTES ABSOLUTE COUNT (BEAKER) 0.43 K/ L    0.30-0.82           

      



             (test code = 415)                                        

 

             EOSINOPHILS ABSOLUTE COUNT 0.02 K/ L    0.04-0.54    L            



             (BEAKER) (test code = 416)                                        

 

             BASOPHILS ABSOLUTE COUNT (BEAKER) 0.01 K/ L    0.01-0.08           

      



             (test code = 417)                                        

 

             IMMATURE GRANULOCYTES-RELATIVE 0.20 %       0.00-1.00              

   



             PERCENT (BEAKER) (test code =                                      

  



             2801)                                               



BASIC METABOLIC [id number] 22:26:36





             Test Item    Value        Reference Range Interpretation Comments

 

             SODIUM (BEAKER) 138 meq/L    136-145                   



             (test code = 381)                                        

 

             POTASSIUM    3.7 meq/L    3.5-5.1                   



             (BEAKER) (test                                        



             code = 379)                                         

 

             CHLORIDE (BEAKER) 105 meq/L                        



             (test code = 382)                                        

 

             CO2 (BEAKER) 25 meq/L     22-29                     



             (test code = 355)                                        

 

             BLOOD UREA   17 mg/dL     7-21                      



             NITROGEN (BEAKER)                                        



             (test code = 354)                                        

 

             CREATININE   0.81 mg/dL   0.57-1.25                 



             (BEAKER) (test                                        



             code = 358)                                         

 

             GLUCOSE RANDOM 115 mg/dL           H            



             (BEAKER) (test                                        



             code = 652)                                         

 

             CALCIUM (BEAKER) 8.5 mg/dL    8.4-10.2                  



             (test code = 697)                                        

 

             EGFR (BEAKER) 105                                     Interpretatio

n of eGFR



             (test code = mL/min/1.73                            values Stage De

scription



             1092)        sq m                                   Result G1 Cindy

l or high



                                                                 >=90 G2 Mildly 

decreased



                                                                 60-89 G3a Mildl

y to



                                                                 moderately 45-5

9 G3b



                                                                 Moderately to s

everely



                                                                 30-44 G4 Severl

y decreased



                                                                 15-29 G5 Kidney

 failure



                                                                 <15Reported eGF

R is based



                                                                 on the CKD-EPI 





                                                                 equation that d

oes not use



                                                                 a race



                                                                 coefficientEsti

mated GFR



                                                                 is not as accur

ate as



                                                                 Creatinine Brittney

fausto in



                                                                 predicting glom

erular



                                                                 filtration rate

. Estimated



                                                                 GFR is not appl

icable for



                                                                 dialysis patien

ts



 ID - [id number] 22:26:36





             Test Item    Value        Reference Range Interpretation Comments

 

             MAGNESIUM (BEAKER) (test code = 1.9 mg/dL    1.6-2.6               

    



             627)                                                



 ID - [id number] 18:09:56





             Test Item    Value        Reference Range Interpretation Comments

 

             THYROID STIMULATING HORMONE 1.883 uIU/mL 0.350-4.940               



             (BEAKER) (test code = 772)                                        



 ID - [id number] 13:08:00





             Test Item    Value        Reference Range Interpretation Comments

 

             WBC (test code = WBC) 4.8          3.7-10.4                  



[id number]:00





             Test Item    Value        Reference Range Interpretation Comments

 

             RBC (test code = RBC) 2.38         4.70-6.10                 



[facility] HospitalJqchhmnZLSHVMJQYF0748-26-00 13:08:00





             Test Item    Value        Reference Range Interpretation Comments

 

             Hgb (test code = Hgb) 8.6          14.0-18.0                 



[facility] HospitalExcaedjAOMYSRHZJQ4670-00-30 13:08:00





             Test Item    Value        Reference Range Interpretation Comments

 

             Hct (test code = Hct) 25.7         42.0-54.0                 



[facility]od[facility]KLUDJONB3606[facility] 13:08:00





             Test Item    Value        Reference Range Interpretation Comments

 

             MCV (test code = MCV) 108.0        80.0-94.0                 



[facility] HospitalTolqmdiZAIJAHHRME0106-65-36 13:08:00





             Test Item    Value        Reference Range Interpretation Comments

 

             MCH (test code = MCH) 35.9 pg      27.0-31.0                 



[facility] Hos[id number] 13:08:00





             Test Item    Value        Reference Range Interpretation Comments

 

             MCHC (test code = MCHC) 33.3         32.0-36.0                 



[facility] Hos[id number] 13:08:00





             Test Item    Value        Reference Range Interpretation Comments

 

             RDW (test code = RDW) 21.4         11.5-14.5                 



[facility] Ho[id number] 13:08:00





             Test Item    Value        Reference Range Interpretation Comments

 

             Platelet (test code = Platelet) 29           133-450               

    



[facility] HospitalHiytbafGLYBQCQXNH4755-11-87 13:08:00





             Test Item    Value        Reference Range Interpretation Comments

 

             MPV (test code = MPV) 7.7          7.4-10.4                  



[facility] HospitalQfaqjfxLGSDPGOYRX6915-24-40 13:08:00





             Test Item    Value        Reference Range Interpretation Comments

 

             Macrocyte (test code = 1+ *ABN*(21                           



             Macrocyte)   8:08 AM)                               



[facility] HospitalEzmtdfkLBTQZGDBEA5112-26-11 13:08:00





             Test Item    Value        Reference Range Interpretation Comments

 

             Plt Morph (test code = Normal (21 8:08                        

   



             Plt Morph)   AM)                                    



[facility] HospitalBchcgrtKAPUGLPVPB6852-20-67 13:08:00





             Test Item    Value        Reference Range Interpretation Comments

 

             Segs (test code = Segs) 37.0         45.0-75.0                 



[facility] HospitalGqwyxloXICKDIGVZY3342-64-95 13:08:00





             Test Item    Value        Reference Range Interpretation Comments

 

             Lymphocytes (test code = Lymphocytes) 54.0         20.0-40.0       

          



[facility] Hospit[id number] 13:08:00





             Test Item    Value        Reference Range Interpretation Comments

 

             Monocytes (test code = Monocytes) 9.0          2.0-12.0            

      



[facility] HospitalZxfuuyyTVQWBAJLMZ3017-10-43 13:08:00





             Test Item    Value        Reference Range Interpretation Comments

 

             Neutrophils # (test code = Neutrophils 1.8          1.5-8.1        

           



             #)                                                  



[facility] HospitalApoetoqDBBSGFNECD6260-62-66 13:08:00





             Test Item    Value        Reference Range Interpretation Comments

 

             Lymphocytes # (test code = Lymphocytes 2.6          1.0-5.5        

           



             #)                                                  



[facility] HospitalKjwzcacSGMPUDRPCI6744-06-93 13:08:00





             Test Item    Value        Reference Range Interpretation Comments

 

             Monocytes # (test code 0.4          See_Comment                [Aut

omated message] The



             = Monocytes #)                                        system which 

generated



                                                                 this result tra

nsmitted



                                                                 reference range

: <=0.8.



                                                                 The reference r

carla was



                                                                 not used to int

erpret



                                                                 this result as



                                                                 normal/abnormal

.



[facility] HospitalBphctouBMHGBTTJTQ2907-63-32 13:08:00





             Test Item    Value        Reference Range Interpretation Comments

 

             Tot Cell Ct (test code = Tot Cell Ct) 100 1                        

          



[facility] HospitalAvofiggRIIVFZKUNE3978-86-71 13:08:00





             Test Item    Value        Reference Range Interpretation Comments

 

             Retic Auto (test code = Retic Auto) 6.0          0.5-1.5           

        



[facility] HospitalZytlhqgZDOQKUBIMY8735-69-18 13:08:00





             Test Item    Value        Reference Range Interpretation Comments

 

             Hct (test code = Hct) 25.7         42.0-54.0                 



[facility] HospitalNxbsesgYSPRQOICQC7848-42-40 13:08:00





             Test Item    Value        Reference Range Interpretation Comments

 

             MCV (test code = MCV) 108.0        80.0-94.0                 



[facility] HospitalWfawtruABTKJJVTLQ4542-97-13 13:08:00





             Test Item    Value        Reference Range Interpretation Comments

 

             MCH (test code = MCH) 35.9 pg      27.0-31.0                 



[facility] HospitalCsiaffwZHSVIJIGSS9175-98-94 13:08:00





             Test Item    Value        Reference Range Interpretation Comments

 

             MCHC (test code = MCHC) 33.3         32.0-36.0                 



[facility] HospitalAdkpkyaITIBNDUFZL3402-95-69 13:08:00





             Test Item    Value        Reference Range Interpretation Comments

 

             RDW (test code = RDW) 21.4         11.5-14.5                 



[facility] HospitalNuadqtpVISEMKVXKZ7973-68-69 13:08:00





             Test Item    Value        Reference Range Interpretation Comments

 

             Platelet (test code = Platelet) 29           133-450               

    



[facility] HospitalIkmxkgpLRZUDCRPJL3185-33-11 13:08:00





             Test Item    Value        Reference Range Interpretation Comments

 

             MPV (test code = MPV) 7.7          7.4-10.4                  



[facility] HospitalLegjzjvQRCJBKYKKD7329-21-04 13:08:00





             Test Item    Value        Reference Range Interpretation Comments

 

             Macrocyte (test code = 1+ *ABN*(21                           



             Macrocyte)   8:08 AM)                               



[facility] HospitalRfykygtNYVPOIOJNT1274-61-71 13:08:00





             Test Item    Value        Reference Range Interpretation Comments

 

             Plt Morph (test code = Normal (21 8:08                        

   



             Plt Morph)   AM)                                    



[facility] HospitalHellehnCDRYAWHEWR9268-87-98 13:08:00





             Test Item    Value        Reference Range Interpretation Comments

 

             Segs (test code = Segs) 37.0         45.0-75.0                 



[facility] HospitalTtkrwelKDOPHUOEMJ0195-58-70 13:08:00





             Test Item    Value        Reference Range Interpretation Comments

 

             Lymphocytes (test code = Lymphocytes) 54.0         20.0-40.0       

          



[facility] HospitalVjyahqmAQPSZXOQII5724-17-18 13:08:00





             Test Item    Value        Reference Range Interpretation Comments

 

             Monocytes (test code = Monocytes) 9.0          2.0-12.0            

      



[id number] 13:08:00





             Test Item    Value        Reference Range Interpretation Comments

 

             Neutrophils # (test code = Neutrophils 1.8          1.5-8.1        

           



             #)                                                  



[facility] HospitalEgohrtdBMCSVENZUX4645-19-25 13:08:00





             Test Item    Value        Reference Range Interpretation Comments

 

             Lymphocytes # (test code = Lymphocytes 2.6          1.0-5.5        

           



             #)                                                  



[facility] HospitalKdsomgwBJFKFYOBEQ7542-11-37 13:08:00





             Test Item    Value        Reference Range Interpretation Comments

 

             Monocytes # (test code 0.4          See_Comment                [Aut

omated message] The



             = Monocytes #)                                        system which 

generated



                                                                 this result tra

nsmitted



                                                                 reference range

: <=0.8.



                                                                 The reference r

carla was



                                                                 not used to int

erpret



                                                                 this result as



                                                                 normal/abnormal

.



[facility] HospitalOvxiorpGENXADWPAO8275-47-09 13:08:00





             Test Item    Value        Reference Range Interpretation Comments

 

             Tot Cell Ct (test code = Tot Cell Ct) 100 1                        

          



[facility] HospitalKklmjmsUBHBZQETLE0224-46-27 13:08:00





             Test Item    Value        Reference Range Interpretation Comments

 

             Retic Auto (test code = Retic Auto) 6.0          0.5-1.5           

        



[facility] HospitalPqsknyoLAXPUPWNMU7949-97-16 13:08:00





             Test Item    Value        Reference Range Interpretation Comments

 

             WBC (test code = WBC) 4.8          3.7-10.4                  



[facility] HospitalXokjviiKHGSQWXZDZ8653-09-45 13:08:00





             Test Item    Value        Reference Range Interpretation Comments

 

             RBC (test code = RBC) 2.38         4.70-6.10                 



[facility] HospitalElsssfqJSSSMBFMGZ9594-88-95 13:08:00





             Test Item    Value        Reference Range Interpretation Comments

 

             Hgb (test code = Hgb) 8.6          14.0-18.0                 



[facility]JPENT[facility]YO3397[facility]53-57 [facility]
2021 13:08:001.8[facility] ZegatrhMBNJXVVKQA4057-49-55 13:08:002.6[facility] 
XsynrxiSJMHGYAPCI9668-88-38 13:08:000.4[facility] CjgotlmUNLKAIIUOQ2011-39-80 
13:08:00





             Test Item    Value        Reference Range Interpretation Comments

 

             Tot Cell Ct (test code = Tot Cell Ct) 100 1                        

          



[facility] HospitalPtxchklRQECXMYRET6341-91-36 13:08:006.0[facility]
2021 13:08:004.8[facility] AhenxcwWNSTVLUQWY7318-09-28 13:08:002.38[facility] 
JlxabcxQUDFPHCSLG2380-35-18 13:08:008.6[facility] GhqwabiFIXVKTZJLE0468-36-33 
13:08:0025.7[facility] KjmfcdtVOSIHOYWBU2149-18-28 13:08:99655.0[facility]
MPENLXELYI1575-27-00 13:08:00





             Test Item    Value        Reference Range Interpretation Comments

 

             MCH (test code = MCH) 35.9 pg      27.0-31.0                 



[facility] VewnhmuHFGTSDEWEX1403-31-24 13:08:0033.3[facility]
2021 13:08:0021.4[facility] TciectxKVECANEEDG0350-00-84 13:08:0029[facility] 
OuuofriTOITDLVYYY6425-40-12 13:08:007.7[facility] RzqoazwGUTLFPJQGK3118-68-58 
13:08:001+ *ABN*(21 8:08 AM)[facility] BeeylniQEHFLBSHKV8383-83-16 13:08:00
Normal (21 8:08 AM)[facility] BkgbconZOSEOOKGIQ7209-59-93 13:08:0037.0
[facility] TnscxgjXSASMUCYTC1943-77-00 13:08:0054.0[facility]ann[facility] + 
SMEAR (NON-SPUTUM)2021 10:28:00





             Test Item    Value        Reference Range Interpretation Comments

 

             CULTURE (BEAKER) (test No acid-fast bacilli                        

   



             code = 1095) isolated in 42 days                           

 

             AFB SMEAR (BEAKER) No acid fast bacilli                           



             (test code = 994) seen                                   



AFB CULTURE + SMEAR (NON-SPUTUM)2021 10:28:00





             Test Item    Value        Reference Range Interpretation Comments

 

             CULTURE (BEAKER) (test No acid-fast bacilli                        

   



             code = 1095) isolated in 42 days                           

 

             AFB SMEAR (BEAKER) No acid fast bacilli                           



             (test code = 994) seen                                   



AFB CULTURE + SMEAR (NON-SPUTUM)2021 10:28:00





             Test Item    Value        Reference Range Interpretation Comments

 

             CULTURE (BEAKER) (test No acid-fast bacilli                        

   



             code = 1095) isolated in 42 days                           

 

             AFB SMEAR (BEAKER) No acid fast bacilli                           



             (test code = 994) seen                                   



AFB CULTURE + SMEAR (NON-SPUTUM)2021 10:28:00





             Test Item    Value        Reference Range Interpretation Comments

 

             CULTURE (BEAKER) (test No acid-fast bacilli                        

   



             code = 1095) isolated in 42 days                           

 

             AFB SMEAR (BEAKER) No acid fast bacilli                           



             (test code = 994) seen                                   



AFB CULTURE + SMEAR (NON-SPUTUM)2021 10:28:00





             Test Item    Value        Reference Range Interpretation Comments

 

             CULTURE (BEAKER) (test No acid-fast bacilli                        

   



             code = 1095) isolated in 42 days                           

 

             AFB SMEAR (BEAKER) No acid fast bacilli                           



             (test code = 994) seen                                   



AFB CULTURE + SMEAR (NON-SPUTUM)2021 10:28:00





             Test Item    Value        Reference Range Interpretation Comments

 

             CULTURE (BEAKER) (test No acid-fast bacilli                        

   



             code = 1095) isolated in 42 days                           

 

             AFB SMEAR (BEAKER) No acid fast bacilli                           



             (test code = 994) seen                                   



AFB CULTURE + SMEAR (NON-SPUTUM)2021 10:28:00





             Test Item    Value        Reference Range Interpretation Comments

 

             CULTURE (BEAKER) (test No acid-fast bacilli                        

   



             code = 1095) isolated in 42 days                           

 

             AFB SMEAR (BEAKER) No acid fast bacilli                           



             (test code = 994) seen                                   



AFB CULTURE + SMEAR (NON-SPUTUM)2021 10:28:00





             Test Item    Value        Reference Range Interpretation Comments

 

             CULTURE (BEAKER) (test No acid-fast bacilli                        

   



             code = 1095) isolated in 42 days                           

 

             AFB SMEAR (BEAKER) No acid fast bacilli                           



             (test code = 994) seen                                   



FUNGUS CULTURE + [id number] 23:16:00





             Test Item    Value        Reference Range Interpretation Comments

 

             CULTURE (BEAKER) (test No fungus isolated in                       

    



             code = 1095) 28 days                                

 

             FUNGUS SMEAR (BEAKER) No fungi seen                           



             (test code = 1406)                                        



FUNGUS CULTURE + [id number] 23:16:00





             Test Item    Value        Reference Range Interpretation Comments

 

             CULTURE (BEAKER) (test No fungus isolated in                       

    



             code = 1095) 28 days                                

 

             FUNGUS SMEAR (BEAKER) No fungi seen                           



             (test code = 1406)                                        



FUNGUS CULTURE + [id number] 23:16:00





             Test Item    Value        Reference Range Interpretation Comments

 

             CULTURE (BEAKER) (test No fungus isolated in                       

    



             code = 1095) 28 days                                

 

             FUNGUS SMEAR (BEAKER) No fungi seen                           



             (test code = 1406)                                        



FUNGUS CULTURE + [id number] 23:15:00





             Test Item    Value        Reference Range Interpretation Comments

 

             CULTURE (BEAKER) (test No fungus isolated in                       

    



             code = 1095) 28 days                                

 

             FUNGUS SMEAR (BEAKER) No fungi seen                           



             (test code = 1406)                                        



FUNGUS CULTURE + [id number] 23:15:00





             Test Item    Value        Reference Range Interpretation Comments

 

             CULTURE (BEAKER) (test No fungus isolated in                       

    



             code = 1095) 28 days                                

 

             FUNGUS SMEAR (BEAKER) No fungi seen                           



             (test code = 1406)                                        



FUNGUS CULTURE + [id number] 23:15:00





             Test Item    Value        Reference Range Interpretation Comments

 

             CULTURE (BEAKER) (test No fungus isolated in                       

    



             code = 1095) 28 days                                

 

             FUNGUS SMEAR (BEAKER) No fungi seen                           



             (test code = 1406)                                        



FUNGUS CULTURE + [id number] 23:15:00





             Test Item    Value        Reference Range Interpretation Comments

 

             CULTURE (BEAKER) (test No fungus isolated in                       

    



             code = 1095) 28 days                                

 

             FUNGUS SMEAR (BEAKER) No fungi seen                           



             (test code = 1406)                                        



FUNGUS CULTURE + [id number] 23:15:00





             Test Item    Value        Reference Range Interpretation Comments

 

             CULTURE (BEAKER) (test No fungus isolated in                       

    



             code = 1095) 28 days                                

 

             FUNGUS SMEAR (BEAKER) No fungi seen                           



             (test code = 1406)                                        



RAD, CHEST, 2 [id number] 12:10:00Reason for Exam:-&gt;Z09 (ICD-10-CM) - S/P
lung surgery, follow-up exam
************************************************************[facility] : 1965 Sex: 
M************************************************************FINAL REPORT 
PATIENT ID: [id number] EXAM: PA and lateral views of the chest. COMPARISON: Chest 
radiograph 2021 CLINICAL HISTORY: Z09 (ICD-10-CM) - S/P lung surgery, 
follow-up exam FINDINGS: Lines/tubes: Left IJ central venous catheter has been 
removed. Lungs: The lungs are well inflated. Postsurgical changesin the left 
lower lung with persistent subsegmental atelectasis in the left lower lobe. 
Improved reticular opacities throughout the left lung suggestive of improving 
volume overload. Right lung is wellexpanded and clear. Pleura: Unchanged small 
left pleural effusion. No right pleural effusion. No pneumothorax. Heart and 
mediastinum: The cardiomediastinal silhouette is normal. Bones and soft tissues:
Mild wedge compression deformity of L2 vertebral body seen on lateral view, 
likely chronic. Upper abdomen: Mild elevation of the left hemidiaphragm. 
IMPRESSION: Stable small left pleural effusion with adjacent atelectasis. 
Improved reticular opacities throughout the left lung, which may represent impro
tunde asymmetric edema. Signed: Orin Waters MDReport Verified 
Date/Time: 2021 12:10:47 Reading Location: [facility] Reading Room [address] [place], [place] [address]Electronically signed by: ORIN BLAIR M.D. on 2021 
12:10 PMSARS-COV2/RT-PCR ([facility])2021 07:53:00





             Test Item    Value        Reference Range Interpretation Comments

 

             SARS-COV2/RT-PCR (test Negative     Not Detected, Negative,        

      



             code = 8983753)              See external report for              



                                       linked test               

 

             SARS-COV-2 PERFORMING LAB [facility] VITO                             



             (test code = 0797849)                                        



Negative result for this test determines that SARS-CoV-2 RNA was not present in 
the specimen above the Limit of Detection (LOD). However, Negative results do 
not preclude SARS-CoV-2 infection and should not be used as the sole basis for 
treatment or patient management decisions. Negative results must be combined 
with clinical observations, patient history, and epidemiological information. A 
false negative result may occur if a specimen is improperly collected, 
transported or handled. A false negative result should be considered if 
patient's recent exposures or clinical presentation indicate that COVID-19 
(SARS-CoV-2) is likely and diagnostic tests for other causes of illness are 
negative. Re-testing should be considered in cases of suspected false 
negatives.The limit of detection for this assay is 800 copies/mL.This SARS CoV-2
test is a real-time RT-PCR test intended for the qualitative detection of 
nucleic acid from SARS-CoV-2 in a nasopharyngeal swab specimen collected from 
individuals suspected of COVID-19 by their healthcare provider.This test has not
been Food and Drug Administration (FDA) cleared or approved. This is a modified 
version of an approved Emergency Use Authorization (EUA) and is in the process 
of review by the FDA. Once authorized by the FDA, the issued EUA will be 
effective until the declaration that circumstances exist justifying the 
authorization of the emergency use ofin vitro diagnostic tests for detection 
and/or diagnosis of COVID-19 is terminated under Section 564(b)(2) of the Act or
the EUA is revoked under Section 564(g) of the Act.Fact Sheet for Healthcare 
Prov
iders:https://www.[employer]/sites/default/files/product/documents/Fact_Sheet_HC

_Adgolusul_Mgcv_YMOH-MbI-8.pdfFact Sheet for Healthcare 
Patients:https://www.[employer]/sites/default/files/product/docume
nts/Ejbx_Cshvn_Csestlms_Glls_UVLP-LlL-2.pdfPerforming Laboratory:[place] [address] [place], [place] [address]BNV W/PLT COUNT &amp; 
AUTO [id number] 07:06:00





             Test Item    Value        Reference Range Interpretation Comments

 

             WHITE BLOOD CELL COUNT (BEAKER) 4.6 K/ L     3.5-10.5              

    



             (test code = 775)                                        

 

             RED BLOOD CELL COUNT (BEAKER) 2.42 M/ L    4.63-6.08    L          

  



             (test code = 761)                                        

 

             HEMOGLOBIN (BEAKER) (test code = 7.7 GM/DL    13.7-17.5    L       

     



             410)                                                

 

             HEMATOCRIT (BEAKER) (test code = 23.5 %       40.1-51.0    L       

     



             411)                                                

 

             MEAN CORPUSCULAR VOLUME (BEAKER) 97.1 fL      79.0-92.2    H       

     



             (test code = 753)                                        

 

             MEAN CORPUSCULAR HEMOGLOBIN 31.8 pg      25.7-32.2                 



             (BEAKER) (test code = 751)                                        

 

             MEAN CORPUSCULAR HEMOGLOBIN CONC 32.8 GM/DL   32.3-36.5            

     



             (BEAKER) (test code = 752)                                        

 

             RED CELL DISTRIBUTION WIDTH 14.4 %       11.6-14.4                 



             (BEAKER) (test code = 412)                                        

 

             PLATELET COUNT (BEAKER) (test code 41 K/CU MM   150-450      L     

       



             = 756)                                              

 

             MEAN PLATELET VOLUME (BEAKER) 10.1 fL      9.4-12.4                

  



             (test code = 754)                                        

 

             NUCLEATED RED BLOOD CELLS (BEAKER) 0 /100 WBC   0-0                

       



             (test code = 413)                                        



(CELLAVISION MANUAL DIFF)2021 07:06:00





             Test Item    Value        Reference Range Interpretation Comments

 

             NEUTROPHILS - REL 49 %                                   



             (CELLAVISION)(BEAKER) (test code =                                 

       



             2816)                                               

 

             LYMPHOCYTES - REL 44 %                                   



             (CELLAVISION)(BEAKER) (test code =                                 

       



             2817)                                               

 

             MONOCYTES - REL 6 %                                    



             (CELLAVISION)(BEAKER) (test code =                                 

       



             2818)                                               

 

             BASOPHILS - REL 1 %                                    



             (CELLAVISION)(BEAKER) (test code =                                 

       



             2820)                                               

 

             NEUTROPHILS - ABS 2.25 K/ul    1.78-5.38                 



             (CELLAVISION)(BEAKER) (test code =                                 

       



             2830)                                               

 

             LYMPHOCYTES - ABS 2.02 K/ul    1.32-3.57                 



             (CELLAVISION)(BEAKER) (test code =                                 

       



             2831)                                               

 

             MONOCYTES - ABS 0.28 K/uL    0.30-0.82    L            



             (CELLAVISION)(BEAKER) (test code =                                 

       



             2832)                                               

 

             BASOPHILS - ABS 0.05 K/uL    0.01-0.08                 



             (CELLAVISION)(BEAKER) (test code =                                 

       



             2835)                                               

 

             TOTAL COUNTED (BEAKER) (test code = 100                            

        



             1351)                                               

 

             WBC MORPHOLOGY (BEAKER) (test code Normal                          

       



             = 487)                                              

 

             PLT MORPHOLOGY (BEAKER) (test code Normal                          

       



             = 486)                                              

 

             ANISOCYTOSIS (BEAKER) (test code = 1+ few                          

       



             961)                                                

 

             MICROCYTES (BEAKER) (test code = 1+ few                            

     



             965)                                                

 

             ARTIFACT (CELLAVISION)(BEAKER) Present                             

   



             (test code = 7342)                                        

 

             PLATELET CONCENTRATION Decreased                              



             (CELLAVISION)(BEAKER) (test code =                                 

       



             3438)                                               



 ID - [id number] ID - Tianna Murillo comments: Slide comments:
BLOOD GAS, [id number] 04:56:00





             Test Item    Value        Reference Range Interpretation Comments

 

             PH ARTERIAL (BEAKER) (test code = 7.51         7.35-7.45    H      

      



             383)                                                

 

             PCO2 ARTERIAL (BEAKER) (test code 32 mm Hg     35-45        L      

      



             = 384)                                              

 

             PO2 ARTERIAL (BEAKER) (test code = 93 mm Hg     80-90        H     

       



             385)                                                

 

             O2 SATURATION ARTERIAL (BEAKER) 97.8 %       96.0-97.0    H        

    



             (test code = 386)                                        

 

             HCO3 ARTERIAL (BEAKER) (test code 25 mmol/L    21-29               

      



             = 388)                                              

 

             BASE EXCESS ARTERIAL (BEAKER) 2.7 mmol/L   -2.0-3.0                

  



             (test code = 387)                                        

 

             PATIENT TEMPERATURE (BEAKER) (test 36.9                            

       



             code = 1818)                                        

 

             FIO2 (BEAKER) (test code = 1819) 21.0                              

     



RAD, CHEST, 1 VIEW, NON [id number] 04:26:00Reason for exam:-&gt;loculated 
pleural effusion complicated by respiratory failure, s/p pleural pigtail 
catheter placement (left)Should this be performed at the bedside?-&gt;Yes
************************************************************[facility] 1965 Sex: 
M************************************************************FINAL REPORT 
PATIENT ID: [id number] CLINICAL INDICATION: Left pleural effusion Comparison: 
2021 The cardiomediastinal contours are stable. The lung volumes remain 
low. Left-sided parenchymal and pleural opacities are similar to previous. There
is no pneumothorax. A left IJ CVC remains in place. Signed: Dhruv Werner 
MDReport Verified Date/Time: 2021 04:26:33 Electronically signed by: DHRUV WERNER M.D. on 2021 04:26 [id number] 04:22:00





             Test Item    Value        Reference Range Interpretation Comments

 

             PREALBUMIN (BEAKER) (test code = 586) 8 mg/dL      14-45        L  

          



 ID - RUTHANN [id number] 04:21:00





             Test Item    Value        Reference Range Interpretation Comments

 

             MAGNESIUM (BEAKER) (test code = 2.2 mg/dL    1.6-2.6               

    



             627)                                                



 ID - RUTHANN MBASIC METABOLIC [id number] 04:21:00





             Test Item    Value        Reference Range Interpretation Comments

 

             SODIUM (BEAKER) 135 meq/L    136-145      L            



             (test code = 381)                                        

 

             POTASSIUM (BEAKER) 4.1 meq/L    3.5-5.1                   



             (test code = 379)                                        

 

             CHLORIDE (BEAKER) 101 meq/L                        



             (test code = 382)                                        

 

             CO2 (BEAKER) (test 23 meq/L     22-29                     



             code = 355)                                         

 

             BLOOD UREA NITROGEN 10 mg/dL     7-21                      



             (BEAKER) (test code                                        



             = 354)                                              

 

             CREATININE (BEAKER) 0.61 mg/dL   0.57-1.25                 



             (test code = 358)                                        

 

             GLUCOSE RANDOM 114 mg/dL           H            



             (BEAKER) (test code                                        



             = 652)                                              

 

             CALCIUM (BEAKER) 8.1 mg/dL    8.4-10.2     L            



             (test code = 697)                                        

 

             EGFR (BEAKER) (test 138 mL/min/1.73                           ESTIM

ATED GFR IS



             code = 1092) sq m                                   NOT AS ACCURATE

 AS



                                                                 CREATININE



                                                                 CLEARANCE IN



                                                                 PREDICTING



                                                                 GLOMERULAR



                                                                 FILTRATION RATE

.



                                                                 ESTIMATED GFR I

S



                                                                 NOT APPLICABLE 

FOR



                                                                 DIALYSIS PATIEN

TS.



 ID - RUTHANN [id number] 04:21:00





             Test Item    Value        Reference Range Interpretation Comments

 

             PHOSPHORUS (BEAKER) (test code = 3.5 mg/dL    2.3-4.7              

     



             604)                                                



 ID - RUTHANN MPROTEIN, [id number] 04:21:00





             Test Item    Value        Reference Range Interpretation Comments

 

             TOTAL PROTEIN (BEAKER) (test code = 6.7 gm/dL    6.0-8.3           

        



             770)                                                



 ID - RUTHANN [id number] 04:21:00





             Test Item    Value        Reference Range Interpretation Comments

 

             ALBUMIN (BEAKER) (test code = 1145) 2.9 g/dL     3.5-5.0      L    

        



 ID - RUTHANN MRAD, CHEST, 1 VIEW, NON [id number] 15:46:00Reason for 
exam:-&gt;chest tube pullShould this be performed at the bedside?-&gt;Yes
************************************************************[facility] 1965 Sex: 
M************************************************************FINAL REPORT 
PATIENT ID: [id number] Chest, one view HISTORY: Removal of chest tube Comparison: 
Earlier study performed on same date Findings: Lungs: Stable airspace disease 
within the left lung. Heart: Normal in size. Pleura: Small left pleural 
effusion, unchanged. No pneumothorax is appreciated. Bones: Unremarkable. 
Lines/tubes: Interval removal of previous left chest tube. Otherwise, no 
significant interval change. Signed: Chris Rodriguez MDReport Verified 
Date/Time: 2021 15:46:19 Reading Location: [address] 
Reading Room Electronically signed by: CHRIS RODRIGUEZ MD on 2021 03:46 PM
BLOOD [id number] 13:01:00





             Test Item    Value        Reference Range Interpretation Comments

 

             CULTURE (BEAKER) (test No growth in 5 days                         

  



             code = 1095)                                        



BLOOD [id number] 13:01:00





             Test Item    Value        Reference Range Interpretation Comments

 

             CULTURE (BEAKER) (test No growth in 5 days                         

  



             code = 1095)                                        



BLOOD GAS, [id number] 05:21:00





             Test Item    Value        Reference Range Interpretation Comments

 

             PH ARTERIAL (BEAKER) (test code = 7.48         7.35-7.45    H      

      



             383)                                                

 

             PCO2 ARTERIAL (BEAKER) (test code 39 mm Hg     35-45               

      



             = 384)                                              

 

             PO2 ARTERIAL (BEAKER) (test code = 120 mm Hg    80-90        H     

       



             385)                                                

 

             O2 SATURATION ARTERIAL (BEAKER) 98.6 %       96.0-97.0    H        

    



             (test code = 386)                                        

 

             HCO3 ARTERIAL (BEAKER) (test code 29 mmol/L    21-29               

      



             = 388)                                              

 

             BASE EXCESS ARTERIAL (BEAKER) 4.8 mmol/L   -2.0-3.0     H          

  



             (test code = 387)                                        

 

             PATIENT TEMPERATURE (BEAKER) (test 37.2                            

       



             code = 1818)                                        

 

             FIO2 (BEAKER) (test code = 1819) 28.0                              

     



CBC W/PLT COUNT &amp; AUTO [id number] 04:34:00





             Test Item    Value        Reference Range Interpretation Comments

 

             WHITE BLOOD CELL COUNT (BEAKER) 4.2 K/ L     3.5-10.5              

    



             (test code = 775)                                        

 

             RED BLOOD CELL COUNT (BEAKER) 2.41 M/ L    4.63-6.08    L          

  



             (test code = 761)                                        

 

             HEMOGLOBIN (BEAKER) (test code = 7.6 GM/DL    13.7-17.5    L       

     



             410)                                                

 

             HEMATOCRIT (BEAKER) (test code = 23.0 %       40.1-51.0    L       

     



             411)                                                

 

             MEAN CORPUSCULAR VOLUME (BEAKER) 95.4 fL      79.0-92.2    H       

     



             (test code = 753)                                        

 

             MEAN CORPUSCULAR HEMOGLOBIN 31.5 pg      25.7-32.2                 



             (BEAKER) (test code = 751)                                        

 

             MEAN CORPUSCULAR HEMOGLOBIN CONC 33.0 GM/DL   32.3-36.5            

     



             (BEAKER) (test code = 752)                                        

 

             RED CELL DISTRIBUTION WIDTH 14.4 %       11.6-14.4                 



             (BEAKER) (test code = 412)                                        

 

             PLATELET COUNT (BEAKER) (test code 41 K/CU MM   150-450      L     

       



             = 756)                                              

 

             MEAN PLATELET VOLUME (BEAKER) 10.5 fL      9.4-12.4                

  



             (test code = 754)                                        

 

             NUCLEATED RED BLOOD CELLS (BEAKER) 0 /100 WBC   0-0                

       



             (test code = 413)                                        

 

             NEUTROPHILS RELATIVE PERCENT 41 %                                  

 



             (BEAKER) (test code = 429)                                        

 

             LYMPHOCYTES RELATIVE PERCENT 51 %                                  

 



             (BEAKER) (test code = 430)                                        

 

             MONOCYTES RELATIVE PERCENT 8 %                                    



             (BEAKER) (test code = 431)                                        

 

             EOSINOPHILS RELATIVE PERCENT 1 %                                   

 



             (BEAKER) (test code = 432)                                        

 

             BASOPHILS RELATIVE PERCENT 0 %                                    



             (BEAKER) (test code = 437)                                        

 

             NEUTROPHILS ABSOLUTE COUNT 1.72 K/ L    1.78-5.38    L            



             (BEAKER) (test code = 670)                                        

 

             LYMPHOCYTES ABSOLUTE COUNT 2.14 K/ L    1.32-3.57                 



             (BEAKER) (test code = 414)                                        

 

             MONOCYTES ABSOLUTE COUNT (BEAKER) 0.32 K/ L    0.30-0.82           

      



             (test code = 415)                                        

 

             EOSINOPHILS ABSOLUTE COUNT 0.02 K/ L    0.04-0.54    L            



             (BEAKER) (test code = 416)                                        

 

             BASOPHILS ABSOLUTE COUNT (BEAKER) 0.01 K/ L    0.01-0.08           

      



             (test code = 417)                                        

 

             IMMATURE GRANULOCYTES-RELATIVE 0 %          0-1                    

   



             PERCENT (BEAKER) (test code =                                      

  



             2801)                                               



BASIC METABOLIC [id number] 04:32:00





             Test Item    Value        Reference Range Interpretation Comments

 

             SODIUM (BEAKER) 136 meq/L    136-145                   



             (test code = 381)                                        

 

             POTASSIUM (BEAKER) 4.4 meq/L    3.5-5.1                   



             (test code = 379)                                        

 

             CHLORIDE (BEAKER) 101 meq/L                        



             (test code = 382)                                        

 

             CO2 (BEAKER) (test 26 meq/L     22-29                     



             code = 355)                                         

 

             BLOOD UREA NITROGEN 7 mg/dL      7-21                      



             (BEAKER) (test code                                        



             = 354)                                              

 

             CREATININE (BEAKER) 0.56 mg/dL   0.57-1.25    L            



             (test code = 358)                                        

 

             GLUCOSE RANDOM 101 mg/dL                        



             (BEAKER) (test code                                        



             = 652)                                              

 

             CALCIUM (BEAKER) 8.0 mg/dL    8.4-10.2     L            



             (test code = 697)                                        

 

             EGFR (BEAKER) (test 152 mL/min/1.73                           ESTIM

ATED GFR IS



             code = 1092) sq m                                   NOT AS ACCURATE

 AS



                                                                 CREATININE



                                                                 CLEARANCE IN



                                                                 PREDICTING



                                                                 GLOMERULAR



                                                                 FILTRATION RATE

.



                                                                 ESTIMATED GFR I

S



                                                                 NOT APPLICABLE 

FOR



                                                                 DIALYSIS PATIEN

TS.



 ID - RUTHANN [id number] 04:32:00





             Test Item    Value        Reference Range Interpretation Comments

 

             MAGNESIUM (BEAKER) (test code = 2.2 mg/dL    1.6-2.6               

    



             627)                                                



 ID - RUTHANN [id number] 04:32:00





             Test Item    Value        Reference Range Interpretation Comments

 

             PHOSPHORUS (BEAKER) (test code = 3.7 mg/dL    2.3-4.7              

     



             604)                                                



 ID - RUTHANN SHANNON, CHEST, 1 VIEW, NON [id number] 03:56:00Reason for 
exam:-&gt;loculated pleural effusion complicated by respiratory failure, s/p 
pleural pigtail catheter placement (left)Should this be performed at the 
bedside?-&gt;Yes************************************************************[facility] : 1965 Sex: 
M************************************************************FINAL REPORT 
PATIENT ID: [id number] RAD, CHEST, 1 VIEW, NON DEPT INDICATION: loculated pleural 
effusion complicated by respiratory failure, s/p pleural pigtail catheter 
placement (left) COMPARISON: Prior day's exam FINDINGS: Portable frontal view of
the chest. IMPRESSION: Support Lines: Stable. Lungs and pleura: Unchanged left-
sided lung opacities and small loculated pleural effusion. No pneumothorax.Heart
and mediastinum: Stable contours.Additional findings: None. Signed: Do Duncan
MDReport Verified Date/Time: 2021 03:56:30 Electronically signed by: DO DUNCAN MD on 2021 03:56 AMCBC W/PLT COUNT &amp; AUTO DIFFERENTIAL
2021 07:24:00





             Test Item    Value        Reference Range Interpretation Comments

 

             WHITE BLOOD CELL COUNT (BEAKER) 3.7 K/ L     3.5-10.5              

    



             (test code = 775)                                        

 

             RED BLOOD CELL COUNT (BEAKER) 2.39 M/ L    4.63-6.08    L          

  



             (test code = 761)                                        

 

             HEMOGLOBIN (BEAKER) (test code = 7.6 GM/DL    13.7-17.5    L       

     



             410)                                                

 

             HEMATOCRIT (BEAKER) (test code = 23.3 %       40.1-51.0    L       

     



             411)                                                

 

             MEAN CORPUSCULAR VOLUME (BEAKER) 97.5 fL      79.0-92.2    H       

     



             (test code = 753)                                        

 

             MEAN CORPUSCULAR HEMOGLOBIN 31.8 pg      25.7-32.2                 



             (BEAKER) (test code = 751)                                        

 

             MEAN CORPUSCULAR HEMOGLOBIN CONC 32.6 GM/DL   32.3-36.5            

     



             (BEAKER) (test code = 752)                                        

 

             RED CELL DISTRIBUTION WIDTH 14.6 %       11.6-14.4    H            



             (BEAKER) (test code = 412)                                        

 

             PLATELET COUNT (BEAKER) (test code 31 K/CU MM   150-450      L     

       



             = 756)                                              

 

             MEAN PLATELET VOLUME (BEAKER) 10.8 fL      9.4-12.4                

  



             (test code = 754)                                        

 

             NUCLEATED RED BLOOD CELLS (BEAKER) 0 /100 WBC   0-0                

       



             (test code = 413)                                        



(CELLAVISION MANUAL DIFF)2021 07:24:00





             Test Item    Value        Reference Range Interpretation Comments

 

             NEUTROPHILS - REL 50 %                                   



             (CELLAVISION)(BEAKER) (test code =                                 

       



             2816)                                               

 

             LYMPHOCYTES - REL 40 %                                   



             (CELLAVISION)(BEAKER) (test code =                                 

       



             2817)                                               

 

             MONOCYTES - REL 5 %                                    



             (CELLAVISION)(BEAKER) (test code =                                 

       



             2818)                                               

 

             BANDS - REL (CELLAVISION)(BEAKER) 5 %          0-10                

      



             (test code = 2826)                                        

 

             NEUTROPHILS - ABS 1.85 K/ul    1.78-5.38                 



             (CELLAVISION)(BEAKER) (test code =                                 

       



             2830)                                               

 

             LYMPHOCYTES - ABS 1.48 K/ul    1.32-3.57                 



             (CELLAVISION)(BEAKER) (test code =                                 

       



             2831)                                               

 

             MONOCYTES - ABS 0.19 K/uL    0.30-0.82    L            



             (CELLAVISION)(BEAKER) (test code =                                 

       



             2832)                                               

 

             BANDS - ABS (CELLAVISION)(BEAKER) 0.19 K/uL    0.00-0.80           

      



             (test code = 2840)                                        

 

             TOTAL COUNTED (BEAKER) (test code = 100                            

        



             1351)                                               

 

             WBC MORPHOLOGY (BEAKER) (test code Normal                          

       



             = 487)                                              

 

             PLT MORPHOLOGY (BEAKER) (test code Normal                          

       



             = 486)                                              

 

             HYPOCHROMIA (BEAKER) (test code = 1+ few                           

      



             963)                                                

 

             ANISOCYTOSIS (BEAKER) (test code = 1+ few                          

       



             961)                                                

 

             MACROCYTES (BEAKER) (test code = 1+ few                            

     



             964)                                                

 

             POIKILOCYTES (BEAKER) (test code = 1+ few                          

       



             966)                                                

 

             OVALOCYTES (BEAKER) (test code = 1+ few                            

     



             477)                                                

 

             ARTIFACT (CELLAVISION)(BEAKER) Present                             

   



             (test code = 3432)                                        

 

             PLATELET CONCENTRATION Decreased                              



             (CELLAVISION)(BEAKER) (test code =                                 

       



             3438)                                               



 ID - [id number] ID - Patrciio Susana comments: Slide comments:BASIC
METABOLIC [id number] 03:46:00





             Test Item    Value        Reference Range Interpretation Comments

 

             SODIUM (BEAKER) 135 meq/L    136-145      L            



             (test code = 381)                                        

 

             POTASSIUM (BEAKER) 4.1 meq/L    3.5-5.1                   



             (test code = 379)                                        

 

             CHLORIDE (BEAKER) 102 meq/L                        



             (test code = 382)                                        

 

             CO2 (BEAKER) (test 26 meq/L     22-29                     



             code = 355)                                         

 

             BLOOD UREA NITROGEN 10 mg/dL     7-21                      



             (BEAKER) (test code                                        



             = 354)                                              

 

             CREATININE (BEAKER) 0.57 mg/dL   0.57-1.25                 



             (test code = 358)                                        

 

             GLUCOSE RANDOM 127 mg/dL           H            



             (BEAKER) (test code                                        



             = 652)                                              

 

             CALCIUM (BEAKER) 7.9 mg/dL    8.4-10.2     L            



             (test code = 697)                                        

 

             EGFR (BEAKER) (test 149 mL/min/1.73                           ESTIM

ATED GFR IS



             code = 1092) sq m                                   NOT AS ACCURATE

 AS



                                                                 CREATININE



                                                                 CLEARANCE IN



                                                                 PREDICTING



                                                                 GLOMERULAR



                                                                 FILTRATION RATE

.



                                                                 ESTIMATED GFR I

S



                                                                 NOT APPLICABLE 

FOR



                                                                 DIALYSIS PATIEN

TS.



 ID - RUTHANN [id number] 03:35:00





             Test Item    Value        Reference Range Interpretation Comments

 

             MAGNESIUM (BEAKER) (test code = 2.2 mg/dL    1.6-2.6               

    



             627)                                                



 ID - RUTHANN [id number] 03:35:00





             Test Item    Value        Reference Range Interpretation Comments

 

             PHOSPHORUS (BEAKER) (test code = 3.7 mg/dL    2.3-4.7              

     



             604)                                                



 ID - RUTHANN MBLOOD GAS, [id number] 03:16:00





             Test Item    Value        Reference Range Interpretation Comments

 

             PH ARTERIAL (BEAKER) (test code = 7.48         7.35-7.45    H      

      



             383)                                                

 

             PCO2 ARTERIAL (BEAKER) (test code 37 mm Hg     35-45               

      



             = 384)                                              

 

             PO2 ARTERIAL (BEAKER) (test code = 155 mm Hg    80-90        H     

       



             385)                                                

 

             O2 SATURATION ARTERIAL (BEAKER) 99.1 %       96.0-97.0    H        

    



             (test code = 386)                                        

 

             HCO3 ARTERIAL (BEAKER) (test code 27 mmol/L    21-29               

      



             = 388)                                              

 

             BASE EXCESS ARTERIAL (BEAKER) 3.5 mmol/L   -2.0-3.0     H          

  



             (test code = 387)                                        

 

             PATIENT TEMPERATURE (BEAKER) (test 37.1                            

       



             code = 1818)                                        

 

             FIO2 (BEAKER) (test code = 1819) 36.0                              

     



RAD, CHEST, 1 VIEW, NON [id number] 03:15:00Reason for exam:-&gt;loculated 
pleural effusion complicated by respiratory failure, s/p pleural pigtail 
catheter placement (left)Should this be performed at the bedside?-&gt;Yes
************************************************************[facility] 1965 Sex: 
M************************************************************FINAL REPORT 
PATIENT ID: [id number] CLINICAL INDICATION: Loculated left pleural effusion 
coupled Respiratory failure Comparison: 2021 The cardiomediastinal contours
are stable. The lung volumes remain low. Central pulmonary vascular prominence 
and left greater than right parenchymal and left pleural opacities are similar 
to previous. There is no pneumothorax. A left-sided chest tube has been removed.
Remaining support lines are stable. Signed: Dhruv Werner MDReport Verified 
Date/Time: 2021 03:15:16 Electronically signed by: DHRUV WERNER M.D. on
2021 03:15 AMPOCT-GLUCOSE [id number] 17:37:00





             Test Item    Value        Reference Range Interpretation Comments

 

             POC-GLUCOSE METER 117 mg/dL           H            : TESTED A

T [facility] [address]



             (BEAKER) (test code =                                        [facility],



             1538)                                               [id number]:



                                                                 /Techni

ayla ID



                                                                 = [id number] for Vi

ce



                                                                 (contract), Natalie clifford



POCT-GLUCOSE [id number] 11:47:00





             Test Item    Value        Reference Range Interpretation Comments

 

             POC-GLUCOSE METER 104 mg/dL                        : TESTED A

T [facility] 67[address]



             (BEAKER) (test code =                                        [facility],



             1538)                                               [address]:



                                                                 /Techni

ayla ID



                                                                 = [id number] for Vi

ce



                                                                 (contract), Natalie clifford



ANAEROBIC [id number] 07:49:00





             Test Item    Value        Reference Range Interpretation Comments

 

             CULTURE (BEAKER) (test No anaerobes isolated                       

    



             code = 1095)                                        



ANAEROBIC [id number] 07:48:00





             Test Item    Value        Reference Range Interpretation Comments

 

             CULTURE (BEAKER) (test No anaerobes isolated                       

    



             code = 1095)                                        



ANAEROBIC [id number] 07:44:00





             Test Item    Value        Reference Range Interpretation Comments

 

             CULTURE (BEAKER) (test No anaerobes isolated                       

    



             code = 1095)                                        



ANAEROBIC [id number] 07:43:00





             Test Item    Value        Reference Range Interpretation Comments

 

             CULTURE (BEAKER) (test No anaerobes isolated                       

    



             code = 1095)                                        



CBC W/PLT COUNT &amp; AUTO [id number] 07:11:00





             Test Item    Value        Reference Range Interpretation Comments

 

             WHITE BLOOD CELL COUNT (BEAKER) 3.8 K/ L     3.5-10.5              

    



             (test code = 775)                                        

 

             RED BLOOD CELL COUNT (BEAKER) 2.44 M/ L    4.63-6.08    L          

  



             (test code = 761)                                        

 

             HEMOGLOBIN (BEAKER) (test code = 7.9 GM/DL    13.7-17.5    L       

     



             410)                                                

 

             HEMATOCRIT (BEAKER) (test code = 23.4 %       40.1-51.0    L       

     



             411)                                                

 

             MEAN CORPUSCULAR VOLUME (BEAKER) 95.9 fL      79.0-92.2    H       

     



             (test code = 753)                                        

 

             MEAN CORPUSCULAR HEMOGLOBIN 32.4 pg      25.7-32.2    H            



             (BEAKER) (test code = 751)                                        

 

             MEAN CORPUSCULAR HEMOGLOBIN CONC 33.8 GM/DL   32.3-36.5            

     



             (BEAKER) (test code = 752)                                        

 

             RED CELL DISTRIBUTION WIDTH 14.9 %       11.6-14.4    H            



             (BEAKER) (test code = 412)                                        

 

             PLATELET COUNT (BEAKER) (test code 22 K/CU MM   150-450      L     

       



             = 756)                                              

 

             MEAN PLATELET VOLUME (BEAKER) 10.5 fL      9.4-12.4                

  



             (test code = 754)                                        

 

             NUCLEATED RED BLOOD CELLS (BEAKER) 0 /100 WBC   0-0                

       



             (test code = 413)                                        



(CELLAVISION MANUAL DIFF)2021 07:11:00





             Test Item    Value        Reference Range Interpretation Comments

 

             NEUTROPHILS - REL 61 %                                   



             (CELLAVISION)(BEAKER) (test code =                                 

       



             2816)                                               

 

             LYMPHOCYTES - REL 32 %                                   



             (CELLAVISION)(BEAKER) (test code =                                 

       



             2817)                                               

 

             MONOCYTES - REL 3 %                                    



             (CELLAVISION)(BEAKER) (test code =                                 

       



             2818)                                               

 

             EOSINOPHILS - REL 2 %                                    



             (CELLAVISION)(BEAKER) (test code =                                 

       



             2819)                                               

 

             BANDS - REL (CELLAVISION)(BEAKER) 1 %          0-10                

      



             (test code = 2826)                                        

 

             ATYPICAL LYMPHOCYTES - REL 1 %          0-0          H            



             (CELLAVISION)(BEAKER) (test code =                                 

       



             2829)                                               

 

             NEUTROPHILS - ABS 2.32 K/ul    1.78-5.38                 



             (CELLAVISION)(BEAKER) (test code =                                 

       



             2830)                                               

 

             LYMPHOCYTES - ABS 1.22 K/ul    1.32-3.57    L            



             (CELLAVISION)(BEAKER) (test code =                                 

       



             2831)                                               

 

             MONOCYTES - ABS 0.11 K/uL    0.30-0.82    L            



             (CELLAVISION)(BEAKER) (test code =                                 

       



             2832)                                               

 

             EOSINOPHILS - ABS 0.08 K/uL    0.04-0.54                 



             (CELLAVISION)(BEAKER) (test code =                                 

       



             2834)                                               

 

             BANDS - ABS (CELLAVISION)(BEAKER) 0.04 K/uL    0.00-0.80           

      



             (test code = 2840)                                        

 

             ATYPICAL LYMPHOCYTES - ABS 0.04 K/uL    0.00-0.00    H            



             (CELLAVISION)(BEAKER) (test code =                                 

       



             2858)                                               

 

             TOTAL COUNTED (BEAKER) (test code = 100                            

        



             1351)                                               

 

             RBC MORPHOLOGY (BEAKER) (test code Normal                          

       



             = 762)                                              

 

             PLT MORPHOLOGY (BEAKER) (test code Normal                          

       



             = 486)                                              

 

             SMUDGE CELLS (BEAKER) (test code = Present                         

       



             1371)                                               

 

             PLATELET CONCENTRATION Decreased                              



             (CELLAVISION)(BEAKER) (test code =                                 

       



             3438)                                               



 ID - [id number] ID - Jamilah BettencourtWing comments: Slide comments:BASIC 
METABOLIC [id number] 03:42:00





             Test Item    Value        Reference Range Interpretation Comments

 

             SODIUM (BEAKER) 136 meq/L    136-145                   



             (test code = 381)                                        

 

             POTASSIUM (BEAKER) 4.1 meq/L    3.5-5.1                   



             (test code = 379)                                        

 

             CHLORIDE (BEAKER) 103 meq/L                        



             (test code = 382)                                        

 

             CO2 (BEAKER) (test 27 meq/L     22-29                     



             code = 355)                                         

 

             BLOOD UREA NITROGEN 9 mg/dL      7-21                      



             (BEAKER) (test code                                        



             = 354)                                              

 

             CREATININE (BEAKER) 0.49 mg/dL   0.57-1.25    L            



             (test code = 358)                                        

 

             GLUCOSE RANDOM 110 mg/dL           H            



             (BEAKER) (test code                                        



             = 652)                                              

 

             CALCIUM (BEAKER) 7.9 mg/dL    8.4-10.2     L            



             (test code = 697)                                        

 

             EGFR (BEAKER) (test 177 mL/min/1.73                           ESTIM

ATED GFR IS



             code = 1092) sq m                                   NOT AS ACCURATE

 AS



                                                                 CREATININE



                                                                 CLEARANCE IN



                                                                 PREDICTING



                                                                 GLOMERULAR



                                                                 FILTRATION RATE

.



                                                                 ESTIMATED GFR I

S



                                                                 NOT APPLICABLE 

FOR



                                                                 DIALYSIS PATIEN

TS.



 ID - [id number] 03:41:00





             Test Item    Value        Reference Range Interpretation Comments

 

             MAGNESIUM (BEAKER) (test code = 2.2 mg/dL    1.6-2.6               

    



             627)                                                



 ID - [id number] 03:41:00





             Test Item    Value        Reference Range Interpretation Comments

 

             PHOSPHORUS (BEAKER) (test code = 3.9 mg/dL    2.3-4.7              

     



             604)                                                



 ID - DBBLOOD GAS, [id number] 03:33:00





             Test Item    Value        Reference Range Interpretation Comments

 

             PH ARTERIAL (BEAKER) (test code = 7.47         7.35-7.45    H      

      



             383)                                                

 

             PCO2 ARTERIAL (BEAKER) (test code 40 mm Hg     35-45               

      



             = 384)                                              

 

             PO2 ARTERIAL (BEAKER) (test code = 182 mm Hg    80-90        H     

       



             385)                                                

 

             O2 SATURATION ARTERIAL (BEAKER) 99.3 %       96.0-97.0    H        

    



             (test code = 386)                                        

 

             HCO3 ARTERIAL (BEAKER) (test code 29 mmol/L    21-29               

      



             = 388)                                              

 

             BASE EXCESS ARTERIAL (BEAKER) 4.9 mmol/L   -2.0-3.0     H          

  



             (test code = 387)                                        

 

             PATIENT TEMPERATURE (BEAKER) (test 37.5                            

       



             code = 1818)                                        

 

             FIO2 (BEAKER) (test code = 1819) 40.0                              

     



RAD, CHEST, 1 VIEW, NON [id number] 03:31:00Reason for exam:-&gt;loculated 
pleural effusion complicated by respiratory failure, s/p pleural pigtail 
catheter placement (left)Should this be performed at the bedside?-&gt;Yes
************************************************************[facility] : 1965 Sex: 
M************************************************************FINAL REPORT 
PATIENT ID: [id number] RAD, CHEST, 1 VIEW, NON DEPT INDICATION: loculated pleural 
effusion complicated by respiratory failure, s/p pleural pigtail catheter 
placement (left) COMPARISON: Prior day's exam FINDINGS: Portable frontal view of
the chest. IMPRESSION: Support Lines: Stable. Lungs and pleura: Unchanged left-
sided parenchymal and pleural opacities. No pneumothorax.Heart and mediastinum: 
Stablecontours.Additional findings: None. Signed: Do Duncan Verified
Date/Time: 2021 03:31:39  Electronically signed by: DO DUNCAN MD on 
2021 03:31 AMBLOOD [id number] 19:01:00





             Test Item    Value        Reference Range Interpretation Comments

 

             CULTURE (BEAKER) (test No growth in 5 days                         

  



             code = 1095)                                        



BLOOD [id number] 19:01:00





             Test Item    Value        Reference Range Interpretation Comments

 

             CULTURE (BEAKER) (test No growth in 5 days                         

  



             code = 1095)                                        



SURGICALLY OBTAINED CULTURE + GRAM [id number] 11:00:00





             Test Item    Value        Reference    Interpretation Comments



                                       Range                     

 

             CULTURE (BEAKER) STAPHYLOCOCCUS              A            <1+ Staph

ylococcus



             (test code = 1095) AUREUS                                 aureus

 

             Clindamycin (test                           S            



             code = 10)                                          

 

             Erythromycin (test                           S            



             code = 4)                                           

 

             Linezolid (test code                           S            



             = 40)                                               

 

             Nitrofurantoin (test                           S            



             code = 23)                                          

 

             Oxacillin (test code                           S            



             = 14)                                               

 

             Rifampin (test code =                           S            



             43)                                                 

 

             Tetracycline (test                           S            



             code = 2)                                           

 

             Trimethoprim +                           S            



             Sulfamethoxazole                                        



             (test code = 47)                                        

 

             Vancomycin (test code                           S            



             = 13)                                               

 

             CULTURE (BEAKER) STAPHYLOCOCCUS              A            <1+ Staph

ylococcus



             (test code = 1095) LUGDUNENSIS                            lusandritaunens

is

 

             Clindamycin (test                           S            



             code = 10)                                          

 

             Erythromycin (test                           S            



             code = 4)                                           

 

             Levofloxacin (test                           S            



             code = 22)                                          

 

             Linezolid (test code                           S            



             = 40)                                               

 

             Nitrofurantoin (test                           S            



             code = 23)                                          

 

             Oxacillin (test code                           R            



             = 14)                                               

 

             Rifampin (test code =                           S            



             43)                                                 

 

             Tetracycline (test                           S            



             code = 2)                                           

 

             Trimethoprim +                           S            



             Sulfamethoxazole                                        



             (test code = 47)                                        

 

             Vancomycin (test code                           S            



             = 13)                                               

 

             GRAM STAIN RESULT <1+ WBCs                               



             (BEAKER) (test code =                                        



             1123)                                               

 

             GRAM STAIN RESULT No organisms seen                           



             (BEAKER) (test code =                                        



             201852)                                             



SURGICALLY OBTAINED CULTURE + GRAM [id number] 11:00:00





             Test Item    Value        Reference Range Interpretation Comments

 

             CULTURE                                A            From Broth Only

 Same



             (BEAKER) (test                                        organism has 

been



             code = 1095)                                        isolated from



                                                                 cultures(s) of 

the same



                                                                 body site and



                                                                 collection date

. Repeat



                                                                 identification 

and



                                                                 susceptibility 

testing



                                                                 performed only 

after



                                                                 consultation wi

th the



                                                                 clinical microb

iology



                                                                 laboratory.Refe

r to



                                                                 previous cultur

e



                                                                 ofStaphylococcu

s aureus

 

             GRAM STAIN   <1+ WBCs                               



             RESULT (BEAKER)                                        



             (test code =                                        



             1123)                                               

 

             GRAM STAIN   No organisms                           



             RESULT (BEAKER) seen                                   



             (test code =                                        



             463420)                                             



ANAEROBIC [id number] 09:42:00





             Test Item    Value        Reference Range Interpretation Comments

 

             CULTURE (BEAKER) (test No anaerobes isolated                       

    



             code = 1095)                                        



ANAEROBIC [id number] 09:41:00





             Test Item    Value        Reference Range Interpretation Comments

 

             CULTURE (BEAKER) (test No anaerobes isolated                       

    



             code = 1095)                                        



ANAEROBIC [id number] 09:41:00





             Test Item    Value        Reference Range Interpretation Comments

 

             CULTURE (BEAKER) (test No anaerobes isolated                       

    



             code = 1095)                                        



DIGOXIN [id number] 09:40:00





             Test Item    Value        Reference Range Interpretation Comments

 

             DIGOXIN LEVEL (BEAKER) (test code = < ng/mL      0.80-2.00    L    

        



             669)                                                



 ID - RUTHANN MANAEROBIC [id number] 09:40:00





             Test Item    Value        Reference Range Interpretation Comments

 

             CULTURE (BEAKER) (test No anaerobes isolated                       

    



             code = 1095)                                        



CBC W/PLT COUNT &amp; AUTO [id number] 06:39:00





             Test Item    Value        Reference Range Interpretation Comments

 

             WHITE BLOOD CELL COUNT 4.1 K/ L     3.5-10.5                  



             (BEAKER) (test code =                                        



             775)                                                

 

             RED BLOOD CELL COUNT 2.55 M/ L    4.63-6.08    L            



             (BEAKER) (test code =                                        



             761)                                                

 

             HEMOGLOBIN (BEAKER) 8.1 GM/DL    13.7-17.5    L            



             (test code = 410)                                        

 

             HEMATOCRIT (BEAKER) 24.1 %       40.1-51.0    L            



             (test code = 411)                                        

 

             MEAN CORPUSCULAR 94.5 fL      79.0-92.2    H            Discordant 

MCV result



             VOLUME (BEAKER) (test                                        compar

ed to previous



             code = 753)                                         result; clinica

l



                                                                 correlation req

uired.

 

             MEAN CORPUSCULAR 31.8 pg      25.7-32.2                 



             HEMOGLOBIN (BEAKER)                                        



             (test code = 751)                                        

 

             MEAN CORPUSCULAR 33.6 GM/DL   32.3-36.5                 



             HEMOGLOBIN CONC                                        



             (BEAKER) (test code =                                        



             752)                                                

 

             RED CELL DISTRIBUTION 15.3 %       11.6-14.4    H            



             WIDTH (BEAKER) (test                                        



             code = 412)                                         

 

             PLATELET COUNT 37 K/CU MM   150-450      L            



             (BEAKER) (test code =                                        



             756)                                                

 

             MEAN PLATELET VOLUME 10.4 fL      9.4-12.4                  



             (BEAKER) (test code =                                        



             754)                                                

 

             NUCLEATED RED BLOOD 0 /100 WBC   0-0                       



             CELLS (BEAKER) (test                                        



             code = 413)                                         



(CELLAVISION MANUAL DIFF)2021 06:39:00





             Test Item    Value        Reference Range Interpretation Comments

 

             NEUTROPHILS - REL 56 %                                   



             (CELLAVISION)(BEAKER) (test code =                                 

       



             2816)                                               

 

             LYMPHOCYTES - REL 40 %                                   



             (CELLAVISION)(BEAKER) (test code =                                 

       



             2817)                                               

 

             MONOCYTES - REL 3 %                                    



             (CELLAVISION)(BEAKER) (test code =                                 

       



             2818)                                               

 

             ATYPICAL LYMPHOCYTES - REL 1 %          0-0          H            



             (CELLAVISION)(BEAKER) (test code =                                 

       



             2829)                                               

 

             NEUTROPHILS - ABS 2.30 K/ul    1.78-5.38                 



             (CELLAVISION)(BEAKER) (test code =                                 

       



             2830)                                               

 

             LYMPHOCYTES - ABS 1.64 K/ul    1.32-3.57                 



             (CELLAVISION)(BEAKER) (test code =                                 

       



             2831)                                               

 

             MONOCYTES - ABS 0.12 K/uL    0.30-0.82    L            



             (CELLAVISION)(BEAKER) (test code =                                 

       



             2832)                                               

 

             ATYPICAL LYMPHOCYTES - ABS 0.04 K/uL    0.00-0.00    H            



             (CELLAVISION)(BEAKER) (test code =                                 

       



             2858)                                               

 

             TOTAL COUNTED (BEAKER) (test code = 100                            

        



             1351)                                               

 

             WBC MORPHOLOGY (BEAKER) (test code Normal                          

       



             = 487)                                              

 

             PLT MORPHOLOGY (BEAKER) (test code Normal                          

       



             = 486)                                              

 

             ANISOCYTOSIS (BEAKER) (test code = 1+ few                          

       



             961)                                                

 

             ARTIFACT (CELLAVISION)(BEAKER) Present                             

   



             (test code = 3432)                                        

 

             PLATELET CONCENTRATION Decreased                              



             (CELLAVISION)(BEAKER) (test code =                                 

       



             3438)                                               



 ID - [id number] ID - Margarita OverholtUser comments: Slide comments:RAD, 
CHEST, 1 VIEW, NON [id number] 05:45:00Reason for exam:-&gt;loculated pleural
effusion complicated by respiratory failure, s/p pleural pigtail catheter 
placement (left)Should this be performed at the bedside?-&gt;Yes
************************************************************[facility] : 1965 Sex: 
M************************************************************FINAL REPORT 
PATIENT ID: [id number] RAD, CHEST, 1 VIEW, NON DEPT INDICATION: loculated pleural 
effusion complicated by respiratory failure, s/p pleural pigtail catheter 
placement (left) COMPARISON: Prior day's exam FINDINGS: Portable frontal view of
the chest. IMPRESSION: Support Lines: Interval extubation and removal of the 
feeding tube. Otherwise, stable. Lungs and pleura: No significant change in left
greaterthan right patchy lung opacities and left pleural effusion. No 
pneumothorax.Heart and mediastinum: Stable contours.Additional findings: None. 
Signed: Do Duncan MDReport Verified Date/Time: 2021 05:45:21 
Electronically signed by: DO DUNCAN MD on 2021 05:45 AMBASIC 
METABOLIC [id number] 04:05:00





             Test Item    Value        Reference Range Interpretation Comments

 

             SODIUM (BEAKER) 140 meq/L    136-145                   



             (test code = 381)                                        

 

             POTASSIUM (BEAKER) 3.6 meq/L    3.5-5.1                   



             (test code = 379)                                        

 

             CHLORIDE (BEAKER) 104 meq/L                        



             (test code = 382)                                        

 

             CO2 (BEAKER) (test 28 meq/L     22-29                     



             code = 355)                                         

 

             BLOOD UREA NITROGEN 11 mg/dL     7-21                      



             (BEAKER) (test code                                        



             = 354)                                              

 

             CREATININE (BEAKER) 0.49 mg/dL   0.57-1.25    L            



             (test code = 358)                                        

 

             GLUCOSE RANDOM 113 mg/dL           H            



             (BEAKER) (test code                                        



             = 652)                                              

 

             CALCIUM (BEAKER) 7.7 mg/dL    8.4-10.2     L            



             (test code = 697)                                        

 

             EGFR (BEAKER) (test 177 mL/min/1.73                           ESTIM

ATED GFR IS



             code = 1092) sq m                                   NOT AS ACCURATE

 AS



                                                                 CREATININE



                                                                 CLEARANCE IN



                                                                 PREDICTING



                                                                 GLOMERULAR



                                                                 FILTRATION RATE

.



                                                                 ESTIMATED GFR I

S



                                                                 NOT APPLICABLE 

FOR



                                                                 DIALYSIS PATIEN

TS.



 ID - RUTHANN [id number] 03:51:00





             Test Item    Value        Reference Range Interpretation Comments

 

             MAGNESIUM (BEAKER) (test code = 2.2 mg/dL    1.6-2.6               

    



             627)                                                



 ID - RUTHANN [id number] 03:51:00





             Test Item    Value        Reference Range Interpretation Comments

 

             PHOSPHORUS (BEAKER) (test code = 3.2 mg/dL    2.3-4.7              

     



             604)                                                



 ID - RUTHANN MBLOOD GAS, [id number] 03:33:00





             Test Item    Value        Reference Range Interpretation Comments

 

             PH ARTERIAL (BEAKER) (test code = 7.52         7.35-7.45    H      

      



             383)                                                

 

             PCO2 ARTERIAL (BEAKER) (test code 38 mm Hg     35-45               

      



             = 384)                                              

 

             PO2 ARTERIAL (BEAKER) (test code = 147 mm Hg    80-90        H     

       



             385)                                                

 

             O2 SATURATION ARTERIAL (BEAKER) 99.1 %       96.0-97.0    H        

    



             (test code = 386)                                        

 

             HCO3 ARTERIAL (BEAKER) (test code 30 mmol/L    21-29        H      

      



             = 388)                                              

 

             BASE EXCESS ARTERIAL (BEAKER) 7.2 mmol/L   -2.0-3.0     H          

  



             (test code = 387)                                        

 

             PATIENT TEMPERATURE (BEAKER) (test 37.5                            

       



             code = 1818)                                        

 

             FIO2 (BEAKER) (test code = 1819) 36.0                              

     



TISSUE [id number] 16:03:00Surgical Pathology Report Case: [id number] 
Authorizing Provider: Sean Hedrick MD Collected: 2021 09:33 AM 
Ordering Location: [address] Received: 2021 11:04 AM Pathologist:  
Jana Dejesus MD Specimens: A) - Pleural, Left, LEFT PARIETAL PLEURA B) - 
Lung, Left Upper Lobe, LEFTUPPER LOBE PEEL C) - Lung, Left Lower Lobe D) - 
Diaphragm, Diaghramatic Peel A. PLEURA, LEFT PARIETAL, DECORTICATION: - 
FIBRINOUS EXUDATE WITH ACUTE INFLAMMATION CONSISTENT WITH EMPYEMA (SEE 
COMMENT)B.PLEURA, VISCERAL, LEFT LOWER LOBE OF LUNG, DECORTICATION: - FIBRINOUS 
EXUDATE WITH ACUTE INFLAMMATION CONSISTENT WITH EMPYEMA C. PLEURA, VISCERAL, 
LEFT LOWER LOBE OF LUNG, DECORTICATION: - FIBRINOUS EXUDATE WITH ACUTE 
INFLAMMATION CONSISTENT WITH EMPYEMA D. PLEURA, VISCERAL, DIAPHRAGMATIC PEEL: - 
FIBRINOUS EXUDATE WITH ACUTE INFLAMMATION CONSISTENT WITH EMPYEMA Signing 
Pathologist Direct Phone Line: [phone] signed by Jana Dejesus MD on 2021 at 4:03 PMAll parts of this caseshow fibrinous 
exudate with marked acute inflammation. Please correlate with microbiology 
culture studies.88305 x 2, [phone] x 2Pleural effusion on left A. Pleura, leftB. 
Lung, left upper lobeC. Lung, left lower lobeD. DiaphragmA. Received fresh 
labeled the patient's name, accession number and "left parietal pleura" is a 2.1
x 0.7 x 0.2 cm tan-red, fibromembranous soft tissue which is entirely submitted 
in A1.B. Received fresh labeled the patient's name, accession number and "left 
upper lobe lung peel" is a 3.6 x 2.7 x 0.3 cm aggregate of tan-red, 
fibromembranous tissue and fibrinopurulent exudate. The specimen is entirely 
submitted in B1-B3.C. Received fresh labeled the patient's name, accession num
stan and "lung, left lower lobe" is a 4.0 x 3.0 x 0.3 cm aggregate of tan-red, 
fibromembranous tissueand fibrinopurulent exudate, which is entirely submitted 
in C1-C3.D. Received fresh labeled the patient's name, accession number and 
"diaphragmatic peel" is a 2.5 x 1.9 x 0.2 cm aggregate of tan-red fib
romembranous tissue, which is entirely submitted in D1-D2.CARI Joseph, 
HT (ASCP)Performed.[facility], Department of Pathology, 
[address] [place] [address], Tel [phone] Developmental Center, Department of Pathology, [address] [place] [address],
Tel [phone] Developmental Center, Department of Pathology, [address] [place] [address], Tel [phone] OBTAINED CULTURE +
GRAM [id number] 14:17:00





             Test Item    Value        Reference Range Interpretation Comments

 

             CULTURE (BEAKER) (test code No growth                              



             = 1095)                                             

 

             GRAM STAIN RESULT (BEAKER) 3+ WBCs                                



             (test code = 1123)                                        

 

             GRAM STAIN RESULT (BEAKER) No organisms seen                       

    



             (test code = 22422)                                        



SURGICALLY OBTAINED CULTURE + GRAM [id number] 14:17:00





             Test Item    Value        Reference Range Interpretation Comments

 

             CULTURE (BEAKER) (test code No growth                              



             = 1095)                                             

 

             GRAM STAIN RESULT (BEAKER) 1+ WBCs                                



             (test code = 1123)                                        

 

             GRAM STAIN RESULT (BEAKER) No organisms seen                       

    



             (test code = 25867)                                        



SURGICALLY OBTAINED CULTURE + GRAM [id number] 14:17:00





             Test Item    Value        Reference Range Interpretation Comments

 

             CULTURE (BEAKER) (test code No growth                              



             = 1095)                                             

 

             GRAM STAIN RESULT (BEAKER) 1+ WBCs                                



             (test code = 1123)                                        

 

             GRAM STAIN RESULT (BEAKER) No organisms seen                       

    



             (test code = 71571)                                        



HEMOGLOBIN AND [id number] 13:03:00





             Test Item    Value        Reference Range Interpretation Comments

 

             HEMOGLOBIN (BEAKER) (test code = 8.5 GM/DL    13.7-17.5    L       

     



             410)                                                

 

             HEMATOCRIT (BEAKER) (test code = 24.2 %       40.1-51.0    L       

     



             411)                                                



 ID - [id number] ID - [id number] ID - [id number] ID - [id number]
SURGICALLY OBTAINED CULTURE + GRAM [id number] 11:58:00





             Test Item    Value        Reference Range Interpretation Comments

 

             CULTURE (BEAKER) (test code No growth                              



             = 1095)                                             

 

             GRAM STAIN RESULT (BEAKER) 1+ WBCs                                



             (test code = 1123)                                        

 

             GRAM STAIN RESULT (BEAKER) No organisms seen                       

    



             (test code = 10360)                                        



SURGICALLY OBTAINED CULTURE + GRAM [id number] 11:58:00





             Test Item    Value        Reference Range Interpretation Comments

 

             CULTURE (BEAKER) (test code No growth                              



             = 1095)                                             

 

             GRAM STAIN RESULT (BEAKER) 2+ WBCs                                



             (test code = 1123)                                        

 

             GRAM STAIN RESULT (BEAKER) No organisms seen                       

    



             (test code = 05073)                                        



SURGICALLY OBTAINED CULTURE + GRAM [id number] 11:48:00





             Test Item    Value        Reference Range Interpretation Comments

 

             CULTURE (BEAKER) (test code No growth                              



             = 1095)                                             

 

             GRAM STAIN RESULT (BEAKER) 4+ WBCs                                



             (test code = 1123)                                        

 

             GRAM STAIN RESULT (BEAKER) No organisms seen                       

    



             (test code = 07432)                                        



BLOOD GAS, [id number] 10:37:00





             Test Item    Value        Reference Range Interpretation Comments

 

             PH ARTERIAL (BEAKER) (test code = 7.54         7.35-7.45    H      

      



             383)                                                

 

             PCO2 ARTERIAL (BEAKER) (test code 37 mm Hg     35-45               

      



             = 384)                                              

 

             PO2 ARTERIAL (BEAKER) (test code = 142 mm Hg    80-90        H     

       



             385)                                                

 

             O2 SATURATION ARTERIAL (BEAKER) 99.1 %       96.0-97.0    H        

    



             (test code = 386)                                        

 

             HCO3 ARTERIAL (BEAKER) (test code 31 mmol/L    21-29        H      

      



             = 388)                                              

 

             BASE EXCESS ARTERIAL (BEAKER) 8.3 mmol/L   -2.0-3.0     H          

  



             (test code = 387)                                        

 

             PATIENT TEMPERATURE (BEAKER) (test 36.9                            

       



             code = 1818)                                        

 

             FIO2 (BEAKER) (test code = 1819) 40.0                              

     



BLOOD GAS, [id number] 08:55:00





             Test Item    Value        Reference Range Interpretation Comments

 

             PH ARTERIAL (BEAKER) (test code = 7.52         7.35-7.45    H      

      



             383)                                                

 

             PCO2 ARTERIAL (BEAKER) (test code 38 mm Hg     35-45               

      



             = 384)                                              

 

             PO2 ARTERIAL (BEAKER) (test code = 177 mm Hg    80-90        H     

       



             385)                                                

 

             O2 SATURATION ARTERIAL (BEAKER) 99.3 %       96.0-97.0    H        

    



             (test code = 386)                                        

 

             HCO3 ARTERIAL (BEAKER) (test code 31 mmol/L    21-29        H      

      



             = 388)                                              

 

             BASE EXCESS ARTERIAL (BEAKER) 7.2 mmol/L   -2.0-3.0     H          

  



             (test code = 387)                                        

 

             PATIENT TEMPERATURE (BEAKER) (test 37.2                            

       



             code = 1818)                                        

 

             FIO2 (BEAKER) (test code = 1819) 40.0                              

     



CBC W/PLT COUNT &amp; AUTO [id number] 06:55:00





             Test Item    Value        Reference Range Interpretation Comments

 

             WHITE BLOOD CELL COUNT (BEAKER) 3.3 K/ L     3.5-10.5     L        

    



             (test code = 775)                                        

 

             RED BLOOD CELL COUNT (BEAKER) 2.02 M/ L    4.63-6.08    L          

  



             (test code = 761)                                        

 

             HEMOGLOBIN (BEAKER) (test code = 6.6 GM/DL    13.7-17.5    L       

     



             410)                                                

 

             HEMATOCRIT (BEAKER) (test code = 19.9 %       40.1-51.0    L       

     



             411)                                                

 

             MEAN CORPUSCULAR VOLUME (BEAKER) 98.5 fL      79.0-92.2    H       

     



             (test code = 753)                                        

 

             MEAN CORPUSCULAR HEMOGLOBIN 32.7 pg      25.7-32.2    H            



             (BEAKER) (test code = 751)                                        

 

             MEAN CORPUSCULAR HEMOGLOBIN CONC 33.2 GM/DL   32.3-36.5            

     



             (BEAKER) (test code = 752)                                        

 

             RED CELL DISTRIBUTION WIDTH 16.0 %       11.6-14.4    H            



             (BEAKER) (test code = 412)                                        

 

             PLATELET COUNT (BEAKER) (test code 48 K/CU MM   150-450      L     

       



             = 756)                                              

 

             MEAN PLATELET VOLUME (BEAKER) 10.8 fL      9.4-12.4                

  



             (test code = 754)                                        

 

             NUCLEATED RED BLOOD CELLS (BEAKER) 0 /100 WBC   0-0                

       



             (test code = 413)                                        



(CELLAVISION MANUAL DIFF)2021 06:55:00





             Test Item    Value        Reference Range Interpretation Comments

 

             NEUTROPHILS - REL 71 %                                   



             (CELLAVISION)(BEAKER) (test code =                                 

       



             2816)                                               

 

             LYMPHOCYTES - REL 19 %                                   



             (CELLAVISION)(BEAKER) (test code =                                 

       



             2817)                                               

 

             MONOCYTES - REL 8 %                                    



             (CELLAVISION)(BEAKER) (test code =                                 

       



             2818)                                               

 

             BANDS - REL (CELLAVISION)(BEAKER) 1 %          0-10                

      



             (test code = 2826)                                        

 

             NEUTROPHILS - ABS 2.34 K/ul    1.78-5.38                 



             (CELLAVISION)(BEAKER) (test code =                                 

       



             2830)                                               

 

             LYMPHOCYTES - ABS 0.63 K/ul    1.32-3.57    L            



             (CELLAVISION)(BEAKER) (test code =                                 

       



             2831)                                               

 

             MONOCYTES - ABS 0.26 K/uL    0.30-0.82    L            



             (CELLAVISION)(BEAKER) (test code =                                 

       



             2832)                                               

 

             BANDS - ABS (CELLAVISION)(BEAKER) 0.03 K/uL    0.00-0.80           

      



             (test code = 2840)                                        

 

             TOTAL COUNTED (BEAKER) (test code = 100                            

        



             1351)                                               

 

             RBC MORPHOLOGY (BEAKER) (test code Normal                          

       



             = 762)                                              

 

             WBC MORPHOLOGY (BEAKER) (test code Normal                          

       



             = 487)                                              

 

             PLT MORPHOLOGY (BEAKER) (test code Normal                          

       



             = 486)                                              

 

             ARTIFACT (CELLAVISION)(BEAKER) Present                             

   



             (test code = 3432)                                        

 

             PLATELET CONCENTRATION Decreased                              



             (CELLAVISION)(BEAKER) (test code =                                 

       



             3438)                                               



 ID - [id number] ID - Margarita OverholtUser comments: Slide comments:
CALCIUM, [id number] 05:22:00





             Test Item    Value        Reference Range Interpretation Comments

 

             CALCIUM IONIZED (BEAKER) (test 1.02 mmol/L  1.12-1.27    L         

   



             code = 698)                                         

 

             PH, BLOOD (BEAKER) (test code = 7.51                               

    



             1810)                                               



BASIC METABOLIC [id number] 04:18:00





             Test Item    Value        Reference Range Interpretation Comments

 

             SODIUM (BEAKER) 142 meq/L    136-145                   



             (test code = 381)                                        

 

             POTASSIUM (BEAKER) 3.5 meq/L    3.5-5.1                   



             (test code = 379)                                        

 

             CHLORIDE (BEAKER) 104 meq/L                        



             (test code = 382)                                        

 

             CO2 (BEAKER) (test 32 meq/L     22-29        H            



             code = 355)                                         

 

             BLOOD UREA NITROGEN 14 mg/dL     7-21                      



             (BEAKER) (test code                                        



             = 354)                                              

 

             CREATININE (BEAKER) 0.50 mg/dL   0.57-1.25    L            



             (test code = 358)                                        

 

             GLUCOSE RANDOM 161 mg/dL           H            



             (BEAKER) (test code                                        



             = 652)                                              

 

             CALCIUM (BEAKER) 7.2 mg/dL    8.4-10.2     L            



             (test code = 697)                                        

 

             EGFR (BEAKER) (test 173 mL/min/1.73                           ESTIM

ATED GFR IS



             code = 1092) sq m                                   NOT AS ACCURATE

 AS



                                                                 CREATININE



                                                                 CLEARANCE IN



                                                                 PREDICTING



                                                                 GLOMERULAR



                                                                 FILTRATION RATE

.



                                                                 ESTIMATED GFR I

S



                                                                 NOT APPLICABLE 

FOR



                                                                 DIALYSIS PATIEN

TS.



 ID - SHANNAN [id number] 04:15:00





             Test Item    Value        Reference Range Interpretation Comments

 

             MAGNESIUM (BEAKER) (test code = 2.2 mg/dL    1.6-2.6               

    



             627)                                                



 ID - SHANNAN [id number] 04:15:00





             Test Item    Value        Reference Range Interpretation Comments

 

             PHOSPHORUS (BEAKER) (test code = 2.4 mg/dL    2.3-4.7              

     



             604)                                                



 ID - SHANNAN WBLOOD GAS, [id number] 04:07:00





             Test Item    Value        Reference Range Interpretation Comments

 

             PH ARTERIAL (BEAKER) (test code = 7.50         7.35-7.45    H      

      



             383)                                                

 

             PCO2 ARTERIAL (BEAKER) (test code 42 mm Hg     35-45               

      



             = 384)                                              

 

             PO2 ARTERIAL (BEAKER) (test code = 189 mm Hg    80-90        H     

       



             385)                                                

 

             O2 SATURATION ARTERIAL (BEAKER) 99.4 %       96.0-97.0    H        

    



             (test code = 386)                                        

 

             HCO3 ARTERIAL (BEAKER) (test code 32 mmol/L    21-29        H      

      



             = 388)                                              

 

             BASE EXCESS ARTERIAL (BEAKER) 8.6 mmol/L   -2.0-3.0     H          

  



             (test code = 387)                                        

 

             PATIENT TEMPERATURE (BEAKER) (test 37.5                            

       



             code = 1818)                                        

 

             FIO2 (BEAKER) (test code = 1819) 50.0                              

     



RAD, CHEST, 1 VIEW, NON [id number] 03:37:00Reason for exam:-&gt;loculated 
pleural effusion complicated by respiratory failure, s/p pleural pigtail 
catheter placement (left)Should this be performed at the bedside?-&gt;Yes
********************************************************[facility] 1965 Sex: 
M************************************************************FINAL REPORT 
PATIENT ID: [id number] RAD, CHEST, 1 VIEW, NON DEPT INDICATION: loculated pleural 
effusion complicated by respiratory failure, s/p pleural pigtail catheter 
placement (left) COMPARISON: Prior day's exam FINDINGS: Portable frontal view of
the chest. IMPRESSION: Support Lines: Interval removal of the right IJ [place]-Jw
catheter with central venous catheter overlying the SVC. Otherwise unchanged 
supportapparatus. Lungs and pleura: Improved aeration of the right lung base 
with persistent discoid atelectasis. Unchanged hazy interstitial airspace 
opacities in left lung with consolidative airspace opacities in the left base 
and small left pleural effusion. No pneumothorax.Heart and mediastinum: Stable 
contours. Additional findings: None. Signed: Deandra Angulo 
Verified Date/Time: 2021 03:37:29 Electronically signed by: DEANDRA ANGULO MD on 2021 03:37 AMCBC W/PLT COUNT &amp; AUTO DIFFERENTIAL
2021 15:40:00





             Test Item    Value        Reference Range Interpretation Comments

 

             WHITE BLOOD CELL COUNT (BEAKER) 2.8 K/ L     3.5-10.5     L        

    



             (test code = 775)                                        

 

             RED BLOOD CELL COUNT (BEAKER) 2.20 M/ L    4.63-6.08    L          

  



             (test code = 761)                                        

 

             HEMOGLOBIN (BEAKER) (test code = 7.1 GM/DL    13.7-17.5    L       

     



             410)                                                

 

             HEMATOCRIT (BEAKER) (test code = 21.6 %       40.1-51.0    L       

     



             411)                                                

 

             MEAN CORPUSCULAR VOLUME (BEAKER) 98.2 fL      79.0-92.2    H       

     



             (test code = 753)                                        

 

             MEAN CORPUSCULAR HEMOGLOBIN 32.3 pg      25.7-32.2    H            



             (BEAKER) (test code = 751)                                        

 

             MEAN CORPUSCULAR HEMOGLOBIN CONC 32.9 GM/DL   32.3-36.5            

     



             (BEAKER) (test code = 752)                                        

 

             RED CELL DISTRIBUTION WIDTH 17.1 %       11.6-14.4    H            



             (BEAKER) (test code = 412)                                        

 

             PLATELET COUNT (BEAKER) (test code 66 K/CU MM   150-450      L     

       



             = 756)                                              

 

             MEAN PLATELET VOLUME (BEAKER) 10.8 fL      9.4-12.4                

  



             (test code = 754)                                        

 

             NUCLEATED RED BLOOD CELLS (BEAKER) 0 /100 WBC   0-0                

       



             (test code = 413)                                        



BRONCHIAL CULTURE + GRAM [id number] 08:31:00





             Test Item    Value        Reference    Interpretation Comments



                                       Range                     

 

             CULTURE (BEAKER) STAPHYLOCOCCUS              A            <1+ Staph

ylococcus



             (test code = 1095) AUREUS                                 aureus

 

             Clindamycin (test                           S            



             code = 10)                                          

 

             Erythromycin (test                           S            



             code = 4)                                           

 

             Linezolid (test code                           S            



             = 40)                                               

 

             Nitrofurantoin (test                           S            



             code = 23)                                          

 

             Oxacillin (test code                           S            



             = 14)                                               

 

             Rifampin (test code =                           S            



             43)                                                 

 

             Tetracycline (test                           S            



             code = 2)                                           

 

             Trimethoprim +                           S            



             Sulfamethoxazole                                        



             (test code = 47)                                        

 

             Vancomycin (test code                           S            



             = 13)                                               

 

             GRAM STAIN RESULT 1+ WBCs                                



             (BEAKER) (test code =                                        



             1123)                                               

 

             GRAM STAIN RESULT No organisms seen                           



             (BEAKER) (test code =                                        



             477638)                                             



&lt;1+ Normal respiratory meme presentSPIN/CONCENTRATION [id number] 
07:53:00





             Test Item    Value        Reference Range Interpretation Comments

 

             CONCENTRATION CHARGED (BEAKER) (test Done                          

         



             code = 2657)                                        



SPIN/CONCENTRATION [id number] 07:52:00





             Test Item    Value        Reference Range Interpretation Comments

 

             CONCENTRATION CHARGED (BEAKER) (test Done                          

         



             code = 2657)                                        



CBC W/PLT COUNT &amp; AUTO [id number] 07:02:00





             Test Item    Value        Reference Range Interpretation Comments

 

             WHITE BLOOD CELL COUNT 3.7 K/ L     3.5-10.5                  



             (BEAKER) (test code =                                        



             775)                                                

 

             RED BLOOD CELL COUNT 2.15 M/ L    4.63-6.08    L            



             (BEAKER) (test code =                                        



             761)                                                

 

             HEMOGLOBIN (BEAKER) 7.1 GM/DL    13.7-17.5    L            



             (test code = 410)                                        

 

             HEMATOCRIT (BEAKER) 20.9 %       40.1-51.0    L            



             (test code = 411)                                        

 

             MEAN CORPUSCULAR 97.2 fL      79.0-92.2    H            



             VOLUME (BEAKER) (test                                        



             code = 753)                                         

 

             MEAN CORPUSCULAR 33.0 pg      25.7-32.2    H            



             HEMOGLOBIN (BEAKER)                                        



             (test code = 751)                                        

 

             MEAN CORPUSCULAR 34.0 GM/DL   32.3-36.5                 



             HEMOGLOBIN CONC                                        



             (BEAKER) (test code =                                        



             752)                                                

 

             RED CELL DISTRIBUTION 17.3 %       11.6-14.4    H            Discor

dant PLT result



             WIDTH (BEAKER) (test                                        compare

d to previous



             code = 412)                                         result; clinica

l



                                                                 correlation



                                                                 required.B.no.3

[id number]



                                                                 ok to released



                                                                 result.

 

             PLATELET COUNT 87 K/CU MM   150-450      L            



             (BEAKER) (test code =                                        



             756)                                                

 

             MEAN PLATELET VOLUME 10.7 fL      9.4-12.4                  



             (BEAKER) (test code =                                        



             754)                                                

 

             NUCLEATED RED BLOOD 0 /100 WBC   0-0                       



             CELLS (BEAKER) (test                                        



             code = 413)                                         



(CELLAVISION MANUAL DIFF)2021 07:02:00





             Test Item    Value        Reference Range Interpretation Comments

 

             NEUTROPHILS - REL 73 %                                   



             (CELLAVISION)(BEAKER) (test code =                                 

       



             2816)                                               

 

             LYMPHOCYTES - REL 16 %                                   



             (CELLAVISION)(BEAKER) (test code =                                 

       



             2817)                                               

 

             MONOCYTES - REL 3 %                                    



             (CELLAVISION)(BEAKER) (test code =                                 

       



             2818)                                               

 

             EOSINOPHILS - REL 2 %                                    



             (CELLAVISION)(BEAKER) (test code =                                 

       



             2819)                                               

 

             BANDS - REL (CELLAVISION)(BEAKER) 6 %          0-10                

      



             (test code = 2826)                                        

 

             NEUTROPHILS - ABS 2.70 K/ul    1.78-5.38                 



             (CELLAVISION)(BEAKER) (test code =                                 

       



             2830)                                               

 

             LYMPHOCYTES - ABS 0.59 K/ul    1.32-3.57    L            



             (CELLAVISION)(BEAKER) (test code =                                 

       



             2831)                                               

 

             MONOCYTES - ABS 0.11 K/uL    0.30-0.82    L            



             (CELLAVISION)(BEAKER) (test code =                                 

       



             2832)                                               

 

             EOSINOPHILS - ABS 0.07 K/uL    0.04-0.54                 



             (CELLAVISION)(BEAKER) (test code =                                 

       



             2834)                                               

 

             BANDS - ABS (CELLAVISION)(BEAKER) 0.22 K/uL    0.00-0.80           

      



             (test code = 2840)                                        

 

             TOTAL COUNTED (BEAKER) (test code = 100                            

        



             1351)                                               

 

             RBC MORPHOLOGY (BEAKER) (test code Normal                          

       



             = 762)                                              

 

             PLT MORPHOLOGY (BEAKER) (test code Normal                          

       



             = 486)                                              

 

             TOXIC GRANULATION (BEAKER) (test Present                           

     



             code = 771)                                         

 

             ARTIFACT (CELLAVISION)(BEAKER) Present                             

   



             (test code = 3432)                                        

 

             PLATELET CONCENTRATION Decreased                              



             (CELLAVISION)(BEAKER) (test code =                                 

       



             3438)                                               



 ID - [id number] ID - Tianna Murillo comments: Slide comments:
BASIC METABOLIC [id number] 05:02:00





             Test Item    Value        Reference Range Interpretation Comments

 

             SODIUM (BEAKER) 142 meq/L    136-145                   



             (test code = 381)                                        

 

             POTASSIUM (BEAKER) 3.8 meq/L    3.5-5.1                   Specimen 

slightly



             (test code = 379)                                        hemolyzed

 

             CHLORIDE (BEAKER) 106 meq/L                        



             (test code = 382)                                        

 

             CO2 (BEAKER) (test 29 meq/L     22-29                     



             code = 355)                                         

 

             BLOOD UREA NITROGEN 16 mg/dL     7-21                      



             (BEAKER) (test code                                        



             = 354)                                              

 

             CREATININE (BEAKER) 0.58 mg/dL   0.57-1.25                 Specimen

 slightly



             (test code = 358)                                        hemolyzed

 

             GLUCOSE RANDOM 148 mg/dL           H            



             (BEAKER) (test code                                        



             = 652)                                              

 

             CALCIUM (BEAKER) 7.3 mg/dL    8.4-10.2     L            



             (test code = 697)                                        

 

             EGFR (BEAKER) (test 146 mL/min/1.73                           ESTIM

ATED GFR IS



             code = 1092) sq m                                   NOT AS ACCURATE

 AS



                                                                 CREATININE



                                                                 CLEARANCE IN



                                                                 PREDICTING



                                                                 GLOMERULAR



                                                                 FILTRATION RATE

.



                                                                 ESTIMATED GFR I

S



                                                                 NOT APPLICABLE 

FOR



                                                                 DIALYSIS PATIEN

TS.



 ID - PIAYA [id number] 04:45:00





             Test Item    Value        Reference Range Interpretation Comments

 

             MAGNESIUM (BEAKER) 2.4 mg/dL    1.6-2.6                   Specimen 

slightly



             (test code = 627)                                        hemolyzed



 ID - PIAYA [id number] 04:45:00





             Test Item    Value        Reference Range Interpretation Comments

 

             PHOSPHORUS (BEAKER) 2.4 mg/dL    2.3-4.7                   Specimen

 slightly



             (test code = 604)                                        hemolyzed



 ID - PIAYA LCALCIUM, [id number] 04:25:00





             Test Item    Value        Reference Range Interpretation Comments

 

             CALCIUM IONIZED (BEAKER) (test 1.04 mmol/L  1.12-1.27    L         

   



             code = 698)                                         

 

             PH, BLOOD (BEAKER) (test code = 7.48                               

    



             1810)                                               



BLOOD GAS, [id number] 04:25:00





             Test Item    Value        Reference Range Interpretation Comments

 

             PH ARTERIAL (BEAKER) (test code = 7.48         7.35-7.45    H      

      



             383)                                                

 

             PCO2 ARTERIAL (BEAKER) (test code 43 mm Hg     35-45               

      



             = 384)                                              

 

             PO2 ARTERIAL (BEAKER) (test code = 143 mm Hg    80-90        H     

       



             385)                                                

 

             O2 SATURATION ARTERIAL (BEAKER) 99.0 %       96.0-97.0    H        

    



             (test code = 386)                                        

 

             HCO3 ARTERIAL (BEAKER) (test code 31 mmol/L    21-29        H      

      



             = 388)                                              

 

             BASE EXCESS ARTERIAL (BEAKER) 6.7 mmol/L   -2.0-3.0     H          

  



             (test code = 387)                                        

 

             PATIENT TEMPERATURE (BEAKER) (test 37.0                            

       



             code = 1818)                                        

 

             FIO2 (BEAKER) (test code = 1819) 50.0                              

     



RAD, CHEST, 1 VIEW, NON [id number] 03:34:00Reason for exam:-&gt;loculated 
pleural effusion complicated by respiratory failure, s/p pleural pigtail 
catheter placement (left)Should this be performed at the bedside?-&gt;Yes
************************************************************[facility] 1965 Sex: 
M************************************************************FINAL REPORT 
PATIENT ID: [id number] RAD, CHEST, 1 VIEW, NON DEPT INDICATION: loculated pleural 
effusion complicated by respiratory failure, s/p pleural pigtail catheter 
placement (left) COMPARISON: Prior day's exam FINDINGS: Portable frontal view of
the chest. IMPRESSION: Support Lines: Stable. Lungs and pleura: Unchanged 
airspace and pleural opacities. No pneumothorax.Heart and mediastinum: Stable 
contours. Additional findings: None. Signed: Deandra Angulo 
Verified Date/Time: 2021 03:34:36 Electronically signed by: DEANDRA ANGULO MD on 2021 03:34 AMBLOOD [id number] 03:01:00





             Test Item    Value        Reference Range Interpretation Comments

 

             CULTURE (BEAKER) (test No growth in 5 days                         

  



             code = 1095)                                        



BLOOD [id number] 03:01:00





             Test Item    Value        Reference Range Interpretation Comments

 

             CULTURE (BEAKER) (test No growth in 5 days                         

  



             code = 1095)                                        



OXYGEN SATURATION, [id number] 20:48:00





             Test Item    Value        Reference Range Interpretation Comments

 

             O2 SATURATION (MEASURED) (BEAKER) 78.2 %                           

      



             (test code = 1455)                                        



CBC W/PLT COUNT &amp; AUTO [id number] 20:42:00





             Test Item    Value        Reference Range Interpretation Comments

 

             WHITE BLOOD CELL COUNT (BEAKER) 4.6 K/ L     3.5-10.5              

    



             (test code = 775)                                        

 

             RED BLOOD CELL COUNT (BEAKER) 2.35 M/ L    4.63-6.08    L          

  



             (test code = 761)                                        

 

             HEMOGLOBIN (BEAKER) (test code = 7.6 GM/DL    13.7-17.5    L       

     



             410)                                                

 

             HEMATOCRIT (BEAKER) (test code = 22.9 %       40.1-51.0    L       

     



             411)                                                

 

             MEAN CORPUSCULAR VOLUME (BEAKER) 97.4 fL      79.0-92.2    H       

     



             (test code = 753)                                        

 

             MEAN CORPUSCULAR HEMOGLOBIN 32.3 pg      25.7-32.2    H            



             (BEAKER) (test code = 751)                                        

 

             MEAN CORPUSCULAR HEMOGLOBIN CONC 33.2 GM/DL   32.3-36.5            

     



             (BEAKER) (test code = 752)                                        

 

             RED CELL DISTRIBUTION WIDTH 17.4 %       11.6-14.4    H            



             (BEAKER) (test code = 412)                                        

 

             PLATELET COUNT (BEAKER) (test 136 K/CU MM  150-450      L          

  



             code = 756)                                         

 

             MEAN PLATELET VOLUME (BEAKER) 10.1 fL      9.4-12.4                

  



             (test code = 754)                                        

 

             NUCLEATED RED BLOOD CELLS 0 /100 WBC   0-0                       



             (BEAKER) (test code = 413)                                        



BLOOD GAS, [id number] 17:10:00





             Test Item    Value        Reference Range Interpretation Comments

 

             PH ARTERIAL (BEAKER) (test code = 7.48         7.35-7.45    H      

      



             383)                                                

 

             PCO2 ARTERIAL (BEAKER) (test code 43 mm Hg     35-45               

      



             = 384)                                              

 

             PO2 ARTERIAL (BEAKER) (test code = 139 mm Hg    80-90        H     

       



             385)                                                

 

             O2 SATURATION ARTERIAL (BEAKER) 98.8 %       96.0-97.0    H        

    



             (test code = 386)                                        

 

             HCO3 ARTERIAL (BEAKER) (test code 30 mmol/L    21-29        H      

      



             = 388)                                              

 

             BASE EXCESS ARTERIAL (BEAKER) 6.6 mmol/L   -2.0-3.0     H          

  



             (test code = 387)                                        

 

             PATIENT TEMPERATURE (BEAKER) (test 38.2                            

       



             code = 1818)                                        

 

             FIO2 (BEAKER) (test code = 1819) 60.0                              

     



BLOOD GAS, [id number] 15:38:00





             Test Item    Value        Reference Range Interpretation Comments

 

             PH ARTERIAL (BEAKER) (test code = 7.52         7.35-7.45    H      

      



             383)                                                

 

             PCO2 ARTERIAL (BEAKER) (test code 39 mm Hg     35-45               

      



             = 384)                                              

 

             PO2 ARTERIAL (BEAKER) (test code = 148 mm Hg    80-90        H     

       



             385)                                                

 

             O2 SATURATION ARTERIAL (BEAKER) 99.0 %       96.0-97.0    H        

    



             (test code = 386)                                        

 

             HCO3 ARTERIAL (BEAKER) (test code 30 mmol/L    21-29        H      

      



             = 388)                                              

 

             BASE EXCESS ARTERIAL (BEAKER) 7.2 mmol/L   -2.0-3.0     H          

  



             (test code = 387)                                        

 

             PATIENT TEMPERATURE (BEAKER) (test 38.8                            

       



             code = 1818)                                        

 

             FIO2 (BEAKER) (test code = 1819) 60.0                              

     



RAD, ABDOMEN/KUB, 1 VIEW [id number] 14:23:00Reason for exam:-&gt;SP corpak 
insertionShould this be performed at the bedside?-&gt;Yes
********************************************************[facility] 1965 Sex: 
M************************************************************FINAL REPORT 
PATIENT ID: [id number] RAD, ABDOMEN/KUB, 1 VIEW AP INDICATION: SP corpak insertion
COMPARISON: NoneTECHNIQUE: Limited portable radiograph of the lower chest and 
upper abdomen was acquired for purposes of evaluating tube placement 
FINDINGS/IMPRESSION:Feeding tube tip overlies the distal stomach Signed: 
Pamela Rioseport Verified Date/Time: 2021 14:23:54 Reading 
Location: [facility] Radiology Reading Room Electronically signed by: PAMELA RIOS MD on 2021 02:23 PMRAD, CHEST, 1 VIEW, NON DEPT
2021 13:54:00Reason for exam:-&gt;acute resp failure, SP VATS, 
decortication.Should this be performed at the bedside?-&gt;Yes
************************************************************[facility]  : 1965 Sex: 
M************************************************************FINAL REPORT 
PATIENT ID: [id number] RAD, CHEST, 1 VIEW, NON DEPT INDICATION: acute resp 
failure, SP VATS, decortication. COMPARISON: Prior day's exam FINDINGS: Portable
frontal view of the chest. IMPRESSION: Support Lines: Left chest tube. Feeding 
tube descends below the diaphragm. Left-sided central catheter tip overlies the 
SVC. [place]-Jw tip overlies the pulmonary outflow tract Lungs and pleura: Left 
greaterthan right interstitial thickening and airspace disease No significant 
pneumothorax.Heart and mediastinum: Stable contours. Stable surgical 
changes.Additional findings: None. Signed: Pamela Rios Verified 
Date/Time: 2021 13:54:03 Reading Location: [facility] Radiology Reading
RoomElectronically signed by: PAMELA RIOS MD on 2021 01:54 
PMCBC W/PLT COUNT &amp; AUTO [id number] 13:12:00





             Test Item    Value        Reference Range Interpretation Comments

 

             WHITE BLOOD CELL COUNT (BEAKER) 4.4 K/ L     3.5-10.5              

    



             (test code = 775)                                        

 

             RED BLOOD CELL COUNT (BEAKER) 2.47 M/ L    4.63-6.08    L          

  



             (test code = 761)                                        

 

             HEMOGLOBIN (BEAKER) (test code = 7.9 GM/DL    13.7-17.5    L       

     



             410)                                                

 

             HEMATOCRIT (BEAKER) (test code = 23.4 %       40.1-51.0    L       

     



             411)                                                

 

             MEAN CORPUSCULAR VOLUME (BEAKER) 94.7 fL      79.0-92.2    H       

     



             (test code = 753)                                        

 

             MEAN CORPUSCULAR HEMOGLOBIN 32.0 pg      25.7-32.2                 



             (BEAKER) (test code = 751)                                        

 

             MEAN CORPUSCULAR HEMOGLOBIN CONC 33.8 GM/DL   32.3-36.5            

     



             (BEAKER) (test code = 752)                                        

 

             RED CELL DISTRIBUTION WIDTH 17.4 %       11.6-14.4    H            



             (BEAKER) (test code = 412)                                        

 

             PLATELET COUNT (BEAKER) (test 137 K/CU MM  150-450      L          

  



             code = 756)                                         

 

             MEAN PLATELET VOLUME (BEAKER) 10.2 fL      9.4-12.4                

  



             (test code = 754)                                        

 

             NUCLEATED RED BLOOD CELLS 0 /100 WBC   0-0                       



             (BEAKER) (test code = 413)                                        



(CELLAVISION MANUAL DIFF)2021 13:12:00





             Test Item    Value        Reference Range Interpretation Comments

 

             NEUTROPHILS - REL 73 %                                   



             (CELLAVISION)(BEAKER) (test code =                                 

       



             2816)                                               

 

             LYMPHOCYTES - REL 14 %                                   



             (CELLAVISION)(BEAKER) (test code =                                 

       



             2817)                                               

 

             MONOCYTES - REL 7 %                                    



             (CELLAVISION)(BEAKER) (test code =                                 

       



             2818)                                               

 

             EOSINOPHILS - REL 1 %                                    



             (CELLAVISION)(BEAKER) (test code =                                 

       



             2819)                                               

 

             BANDS - REL (CELLAVISION)(BEAKER) 5 %          0-10                

      



             (test code = 2826)                                        

 

             NEUTROPHILS - ABS 3.21 K/ul    1.78-5.38                 



             (CELLAVISION)(BEAKER) (test code =                                 

       



             2830)                                               

 

             LYMPHOCYTES - ABS 0.62 K/ul    1.32-3.57    L            



             (CELLAVISION)(BEAKER) (test code =                                 

       



             2831)                                               

 

             MONOCYTES - ABS 0.31 K/uL    0.30-0.82                 



             (CELLAVISION)(BEAKER) (test code =                                 

       



             2832)                                               

 

             EOSINOPHILS - ABS 0.04 K/uL    0.04-0.54                 



             (CELLAVISION)(BEAKER) (test code =                                 

       



             2834)                                               

 

             BANDS - ABS (CELLAVISION)(BEAKER) 0.22 K/uL    0.00-0.80           

      



             (test code = 2840)                                        

 

             TOTAL COUNTED (BEAKER) (test code = 100                            

        



             1351)                                               

 

             PLT MORPHOLOGY (BEAKER) (test code Normal                          

       



             = 486)                                              

 

             DOHLE BODIES (BEAKER) (test code = Present                         

       



             359)                                                

 

             TOXIC GRANULATION (BEAKER) (test Present                           

     



             code = 771)                                         

 

             POLYCHROMATOPHILLIC RBCS(BEAKER) 1+ few                            

     



             (test code = 478)                                        

 

             ANISOCYTOSIS (BEAKER) (test code = 1+ few                          

       



             961)                                                

 

             MICROCYTES (BEAKER) (test code = 1+ few                            

     



             965)                                                

 

             PLATELET CONCENTRATION Decreased                              



             (CELLAVISION)(BEAKER) (test code =                                 

       



             3438)                                               



 ID - Tianna Murillo comments: Slide comments:MAGNESIUM
2021 12:57:00





             Test Item    Value        Reference Range Interpretation Comments

 

             MAGNESIUM (BEAKER) (test code = 2.1 mg/dL    1.6-2.6               

    



             627)                                                



 ID - ADMINBASIC METABOLIC [id number] 12:57:00





             Test Item    Value        Reference Range Interpretation Comments

 

             SODIUM (BEAKER) 141 meq/L    136-145                   



             (test code = 381)                                        

 

             POTASSIUM (BEAKER) 4.0 meq/L    3.5-5.1                   



             (test code = 379)                                        

 

             CHLORIDE (BEAKER) 106 meq/L                        



             (test code = 382)                                        

 

             CO2 (BEAKER) (test 29 meq/L     22-29                     



             code = 355)                                         

 

             BLOOD UREA NITROGEN 18 mg/dL     7-21                      



             (BEAKER) (test code                                        



             = 354)                                              

 

             CREATININE (BEAKER) 0.58 mg/dL   0.57-1.25                 



             (test code = 358)                                        

 

             GLUCOSE RANDOM 156 mg/dL           H            



             (BEAKER) (test code                                        



             = 652)                                              

 

             CALCIUM (BEAKER) 7.7 mg/dL    8.4-10.2     L            



             (test code = 697)                                        

 

             EGFR (BEAKER) (test 146 mL/min/1.73                           ESTIM

ATED GFR IS



             code = 1092) sq m                                   NOT AS ACCURATE

 AS



                                                                 CREATININE



                                                                 CLEARANCE IN



                                                                 PREDICTING



                                                                 GLOMERULAR



                                                                 FILTRATION RATE

.



                                                                 ESTIMATED GFR I

S



                                                                 NOT APPLICABLE 

FOR



                                                                 DIALYSIS PATIEN

TS.



 ID - ADMINBLOOD GAS, [id number] 12:51:00





             Test Item    Value        Reference Range Interpretation Comments

 

             PH ARTERIAL (BEAKER) (test code = 7.51         7.35-7.45    H      

      



             383)                                                

 

             PCO2 ARTERIAL (BEAKER) (test code 37 mm Hg     35-45               

      



             = 384)                                              

 

             PO2 ARTERIAL (BEAKER) (test code = 169 mm Hg    80-90        H     

       



             385)                                                

 

             O2 SATURATION ARTERIAL (BEAKER) 99.3 %       96.0-97.0    H        

    



             (test code = 386)                                        

 

             HCO3 ARTERIAL (BEAKER) (test code 29 mmol/L    21-29               

      



             = 388)                                              

 

             BASE EXCESS ARTERIAL (BEAKER) 5.6 mmol/L   -2.0-3.0     H          

  



             (test code = 387)                                        

 

             PATIENT TEMPERATURE (BEAKER) (test 37.0                            

       



             code = 1818)                                        

 

             FIO2 (BEAKER) (test code = 1819) 100.0                             

     



OXYGEN SATURATION, [id number] 12:46:00





             Test Item    Value        Reference Range Interpretation Comments

 

             O2 SATURATION (MEASURED) (BEAKER) 75.6 %                           

      



             (test code = 1455)                                        



LACTIC ACID, [id number] 12:45:00





             Test Item    Value        Reference Range Interpretation Comments

 

             LACTATE BLOOD 1.4 mmol/L   0.5-2.2                   Specimen sligh

tly



             ARTERIAL (2) (BEAKER)                                        hemoly

zed



             (test code = 2874)                                        



 ID - ADMINPROTHROMBIN TIME/[id number] 10:42:00





             Test Item    Value        Reference Range Interpretation Comments

 

             PROTIME (BEAKER) 15.9 seconds 11.9-14.2    H            



             (test code = 759)                                        

 

             INR (BEAKER) (test 1.31         See_Comment                [Automat

ed message]



             code = 370)                                         The system [employer]



                                                                 generated this 

result



                                                                 transmitted ref

erence



                                                                 range: <=5.90. 

The



                                                                 reference range

 was



                                                                 not used to int

erpret



                                                                 this result as



                                                                 normal/abnormal

.



Effective 2019: PT Reference Range ChangeNew: 11.9-14.2 Previous: 11.7-
14.7RECOMMENDED COUMADIN/WARFARIN INR THERAPY RANGESSTANDARD DOSE: 2.0-3.0 
Includes: PROPHYLAXIS for venous thrombosis, systemic embolization; TREATMENT 
for venous thrombosis and/or pulmonary embolus.HIGH RISK: Target INR is 2.5-3.5 
for patients wiht mechanical heart valves.[id number] 10:42:00





             Test Item    Value        Reference Range Interpretation Comments

 

             FIBRINOGEN LEVEL (BEAKER) (test 599 mg/dl    225-434      H        

    



             code = 658)                                         



[id number] 10:42:00





             Test Item    Value        Reference Range Interpretation Comments

 

             PARTIAL THROMBOPLASTIN TIME 28.8 seconds 22.5-36.0                 



             (BEAKER) (test code = 760)                                        



PLATELET [id number] 10:34:00





             Test Item    Value        Reference Range Interpretation Comments

 

             PLATELET COUNT (BEAKER) (test 121 K/CU MM  150-450      L          

  



             code = 756)                                         



 ID - [id number] 09:55:00





             Test Item    Value        Reference Range Interpretation Comments

 

             CULTURE (BEAKER) (test code No MRSA isolated                       

    



             = 1095)                                             



CALCIUM, [id number] 09:49:00





             Test Item    Value        Reference Range Interpretation Comments

 

             CALCIUM IONIZED (BEAKER) (test 1.02 mmol/L  1.12-1.27    L         

   



             code = 698)                                         

 

             PH, BLOOD (BEAKER) (test code = 7.48                               

    



             1810)                                               



BLOOD GAS, [id number] 09:49:00





             Test Item    Value        Reference Range Interpretation Comments

 

             PH ARTERIAL (BEAKER) (test code = 7.49         7.35-7.45    H      

      



             383)                                                

 

             PCO2 ARTERIAL (BEAKER) (test code 36 mm Hg     35-45               

      



             = 384)                                              

 

             PO2 ARTERIAL (BEAKER) (test code = 71 mm Hg     80-90        L     

       



             385)                                                

 

             O2 SATURATION ARTERIAL (BEAKER) 95.9 %       96.0-97.0    L        

    



             (test code = 386)                                        

 

             HCO3 ARTERIAL (BEAKER) (test code 27 mmol/L    21-29               

      



             = 388)                                              

 

             BASE EXCESS ARTERIAL (BEAKER) 3.4 mmol/L   -2.0-3.0     H          

  



             (test code = 387)                                        

 

             PATIENT TEMPERATURE (BEAKER) (test 36.2                            

       



             code = 1818)                                        

 

             FIO2 (BEAKER) (test code = 1819) 100.0                             

     



GLUCOSE-STAT [id number] 09:49:00





             Test Item    Value        Reference Range Interpretation Comments

 

             GLUCOSE RANDOM (BEAKER) (test code 155 mg/dL           H     

       



             = 652)                                              



HGB/HCT (H&amp;H) - STAT [id number] 09:49:00





             Test Item    Value        Reference Range Interpretation Comments

 

             HEMOGLOBIN (BEAKER) (test code = 9.9 GM/DL    13.0-16.8    L       

     



             410)                                                

 

             HEMATOCRIT (BEAKER) (test code = 29.0 %       40.0-50.0    L       

     



             411)                                                



SODIUM NA-STAT [id number] 09:48:00





             Test Item    Value        Reference Range Interpretation Comments

 

             SODIUM (BEAKER) (test code = 381) 136 meq/L    136-145             

      



POTASSIUM-STAT [id number] 09:48:00





             Test Item    Value        Reference Range Interpretation Comments

 

             POTASSIUM (BEAKER) (test code = 3.8 meq/L    3.6-5.5               

    



             379)                                                



BLOOD GAS, [id number] 09:07:00





             Test Item    Value        Reference Range Interpretation Comments

 

             PH ARTERIAL (BEAKER) (test code = 7.45         7.35-7.45           

      



             383)                                                

 

             PCO2 ARTERIAL (BEAKER) (test code 44 mm Hg     35-45               

      



             = 384)                                              

 

             PO2 ARTERIAL (BEAKER) (test code = 117 mm Hg    80-90        H     

       



             385)                                                

 

             O2 SATURATION ARTERIAL (BEAKER) 98.4 %       96.0-97.0    H        

    



             (test code = 386)                                        

 

             HCO3 ARTERIAL (BEAKER) (test code 30 mmol/L    21-29        H      

      



             = 388)                                              

 

             BASE EXCESS ARTERIAL (BEAKER) 5.3 mmol/L   -2.0-3.0     H          

  



             (test code = 387)                                        

 

             PATIENT TEMPERATURE (BEAKER) (test 37.0                            

       



             code = 1818)                                        

 

             FIO2 (BEAKER) (test code = 1819) 90.0                              

     



GLUCOSE-STAT [id number] 09:07:00





             Test Item    Value        Reference Range Interpretation Comments

 

             GLUCOSE RANDOM (BEAKER) (test code 148 mg/dL           H     

       



             = 652)                                              



HGB/HCT (H&amp;H) - STAT [id number] 09:07:00





             Test Item    Value        Reference Range Interpretation Comments

 

             HEMOGLOBIN (BEAKER) (test code = 8.0 GM/DL    13.0-16.8    L       

     



             410)                                                

 

             HEMATOCRIT (BEAKER) (test code = 24.0 %       40.0-50.0    L       

     



             411)                                                



CALCIUM, [id number] 09:07:00





             Test Item    Value        Reference Range Interpretation Comments

 

             CALCIUM IONIZED (BEAKER) (test 1.04 mmol/L  1.12-1.27    L         

   



             code = 698)                                         

 

             PH, BLOOD (BEAKER) (test code = 7.45                               

    



             1810)                                               



SODIUM NA-STAT [id number] 09:05:00





             Test Item    Value        Reference Range Interpretation Comments

 

             SODIUM (BEAKER) (test code = 381) 136 meq/L    136-145             

      



POTASSIUM-STAT [id number] 09:05:00





             Test Item    Value        Reference Range Interpretation Comments

 

             POTASSIUM (BEAKER) (test code = 3.7 meq/L    3.6-5.5               

    



             379)                                                



CBC W/PLT COUNT &amp; AUTO [id number] 07:16:00





             Test Item    Value        Reference Range Interpretation Comments

 

             WHITE BLOOD CELL COUNT (BEAKER) 4.1 K/ L     3.5-10.5              

    



             (test code = 775)                                        

 

             RED BLOOD CELL COUNT (BEAKER) 2.06 M/ L    4.63-6.08    L          

  



             (test code = 761)                                        

 

             HEMOGLOBIN (BEAKER) (test code = 6.9 GM/DL    13.7-17.5    L       

     



             410)                                                

 

             HEMATOCRIT (BEAKER) (test code = 20.8 %       40.1-51.0    L       

     



             411)                                                

 

             MEAN CORPUSCULAR VOLUME (BEAKER) 101.0 fL     79.0-92.2    H       

     



             (test code = 753)                                        

 

             MEAN CORPUSCULAR HEMOGLOBIN 33.5 pg      25.7-32.2    H            



             (BEAKER) (test code = 751)                                        

 

             MEAN CORPUSCULAR HEMOGLOBIN CONC 33.2 GM/DL   32.3-36.5            

     



             (BEAKER) (test code = 752)                                        

 

             RED CELL DISTRIBUTION WIDTH 16.7 %       11.6-14.4    H            



             (BEAKER) (test code = 412)                                        

 

             PLATELET COUNT (BEAKER) (test code 59 K/CU MM   150-450      L     

       



             = 756)                                              

 

             MEAN PLATELET VOLUME (BEAKER) 10.2 fL      9.4-12.4                

  



             (test code = 754)                                        

 

             NUCLEATED RED BLOOD CELLS (BEAKER) 0 /100 WBC   0-0                

       



             (test code = 413)                                        



(CELLAVISION MANUAL DIFF)2021 07:16:00





             Test Item    Value        Reference Range Interpretation Comments

 

             NEUTROPHILS - REL 74 %                                   



             (CELLAVISION)(BEAKER) (test code =                                 

       



             2816)                                               

 

             LYMPHOCYTES - REL 14 %                                   



             (CELLAVISION)(BEAKER) (test code =                                 

       



             2817)                                               

 

             MONOCYTES - REL 7 %                                    



             (CELLAVISION)(BEAKER) (test code =                                 

       



             2818)                                               

 

             BANDS - REL (CELLAVISION)(BEAKER) 2 %          0-10                

      



             (test code = 2826)                                        

 

             ATYPICAL LYMPHOCYTES - REL 2 %          0-0          H            



             (CELLAVISION)(BEAKER) (test code =                                 

       



             2829)                                               

 

             NEUTROPHILS - ABS 3.03 K/ul    1.78-5.38                 



             (CELLAVISION)(BEAKER) (test code =                                 

       



             2830)                                               

 

             LYMPHOCYTES - ABS 0.57 K/ul    1.32-3.57    L            



             (CELLAVISION)(BEAKER) (test code =                                 

       



             2831)                                               

 

             MONOCYTES - ABS 0.29 K/uL    0.30-0.82    L            



             (CELLAVISION)(BEAKER) (test code =                                 

       



             2832)                                               

 

             BANDS - ABS (CELLAVISION)(BEAKER) 0.08 K/uL    0.00-0.80           

      



             (test code = 2840)                                        

 

             ATYPICAL LYMPHOCYTES - ABS 0.08 K/uL    0.00-0.00    H            



             (CELLAVISION)(BEAKER) (test code =                                 

       



             2858)                                               

 

             TOTAL COUNTED (BEAKER) (test code = 100                            

        



             1351)                                               

 

             PLT MORPHOLOGY (BEAKER) (test code Normal                          

       



             = 486)                                              

 

             DOHLE BODIES (BEAKER) (test code = Present                         

       



             359)                                                

 

             TOXIC GRANULATION (BEAKER) (test Present                           

     



             code = 771)                                         

 

             ANISOCYTOSIS (BEAKER) (test code = 1+ few                          

       



             961)                                                

 

             ARTIFACT (CELLAVISION)(BEAKER) Present                             

   



             (test code = 3432)                                        

 

             PLATELET CONCENTRATION Decreased                              



             (CELLAVISION)(BEAKER) (test code =                                 

       



             3438)                                               



 ID - Tianna Murillo comments: Slide comments:PREALBUMIN
2021 04:12:00





             Test Item    Value        Reference Range Interpretation Comments

 

             PREALBUMIN (BEAKER) 4 mg/dL      14-45        L            Specimen

 slightly



             (test code = 586)                                        hemolyzed



 ID - ADMINBASIC METABOLIC [id number] 03:55:00





             Test Item    Value        Reference Range Interpretation Comments

 

             SODIUM (BEAKER) 140 meq/L    136-145                   



             (test code = 381)                                        

 

             POTASSIUM (BEAKER) 3.9 meq/L    3.5-5.1                   



             (test code = 379)                                        

 

             CHLORIDE (BEAKER) 106 meq/L                        



             (test code = 382)                                        

 

             CO2 (BEAKER) (test 28 meq/L     22-29                     



             code = 355)                                         

 

             BLOOD UREA NITROGEN 20 mg/dL     7-21                      



             (BEAKER) (test code                                        



             = 354)                                              

 

             CREATININE (BEAKER) 0.60 mg/dL   0.57-1.25                 



             (test code = 358)                                        

 

             GLUCOSE RANDOM 149 mg/dL           H            



             (BEAKER) (test code                                        



             = 652)                                              

 

             CALCIUM (BEAKER) 7.7 mg/dL    8.4-10.2     L            



             (test code = 697)                                        

 

             EGFR (BEAKER) (test 140 mL/min/1.73                           ESTIM

ATED GFR IS



             code = 1092) sq m                                   NOT AS ACCURATE

 AS



                                                                 CREATININE



                                                                 CLEARANCE IN



                                                                 PREDICTING



                                                                 GLOMERULAR



                                                                 FILTRATION RATE

.



                                                                 ESTIMATED GFR I

S



                                                                 NOT APPLICABLE 

FOR



                                                                 DIALYSIS PATIEN

TS.



 ID - [id number] 03:52:00





             Test Item    Value        Reference Range Interpretation Comments

 

             MAGNESIUM (BEAKER) (test code = 2.2 mg/dL    1.6-2.6               

    



             627)                                                



 ID - [id number] 03:52:00





             Test Item    Value        Reference Range Interpretation Comments

 

             PHOSPHORUS (BEAKER) (test code = 2.3 mg/dL    2.3-4.7              

     



             604)                                                



 ID - ADMINPROTEIN, [id number] 03:52:00





             Test Item    Value        Reference Range Interpretation Comments

 

             TOTAL PROTEIN (BEAKER) (test code = 5.0 gm/dL    6.0-8.3      L    

        



             770)                                                



 ID - [id number] 03:52:00





             Test Item    Value        Reference Range Interpretation Comments

 

             ALBUMIN (BEAKER) (test code = 1145) 2.3 g/dL     3.5-5.0      L    

        



 ID - ADMINLACTIC ACID, [id number] 03:41:00





             Test Item    Value        Reference Range Interpretation Comments

 

             LACTATE BLOOD 1.0 mmol/L   0.5-2.2                   Specimen sligh

tly



             ARTERIAL (2) (BEAKER)                                        hemoly

zed



             (test code = 2874)                                        



 ID - ADMINPROTHROMBIN TIME/[id number] 03:39:00





             Test Item    Value        Reference Range Interpretation Comments

 

             PROTIME (BEAKER) 15.7 seconds 11.9-14.2    H            



             (test code = 759)                                        

 

             INR (BEAKER) (test 1.30         See_Comment                [Automat

ed message]



             code = 370)                                         The system [employer]



                                                                 generated this 

result



                                                                 transmitted ref

erence



                                                                 range: <=5.90. 

The



                                                                 reference range

 was



                                                                 not used to int

erpret



                                                                 this result as



                                                                 normal/abnormal

.



Effective 2019: PT Reference Range ChangeNew: 11.9-14.2 Previous: 11.7-
14.7RECOMMENDED COUMADIN/WARFARIN INR THERAPY RANGESSTANDARD DOSE: 2.0-3.0 
Includes: PROPHYLAXIS for venous thrombosis, systemic embolization; TREATMENT 
for venous thrombosis and/or pulmonary embolus.HIGH RISK: Target INR is 2.5-3.5 
for patients wiht mechanical heart valves.BLOOD GAS, [id number] 03:29:00





             Test Item    Value        Reference Range Interpretation Comments

 

             PH ARTERIAL (BEAKER) (test code = 7.42         7.35-7.45           

      



             383)                                                

 

             PCO2 ARTERIAL (BEAKER) (test code 45 mm Hg     35-45               

      



             = 384)                                              

 

             PO2 ARTERIAL (BEAKER) (test code = 130 mm Hg    80-90        H     

       



             385)                                                

 

             O2 SATURATION ARTERIAL (BEAKER) 98.5 %       96.0-97.0    H        

    



             (test code = 386)                                        

 

             HCO3 ARTERIAL (BEAKER) (test code 28 mmol/L    21-29               

      



             = 388)                                              

 

             BASE EXCESS ARTERIAL (BEAKER) 3.9 mmol/L   -2.0-3.0     H          

  



             (test code = 387)                                        

 

             PATIENT TEMPERATURE (BEAKER) (test 38.5                            

       



             code = 1818)                                        

 

             FIO2 (BEAKER) (test code = 1819) 60.0                              

     



RAD, CHEST, 1 VIEW, NON [id number] 02:44:00Reason for exam:-&gt;loculated 
pleural effusion complicated by respiratory failure, s/p pleural pigtail 
catheter placement (left)Should this be performed at the bedside?-&gt;Yes
************************************************************[facility] : 1965 Sex: 
M************************************************************FINAL REPORT 
PATIENT ID: [id number] CLINICAL INDICATION: Support lines. Comparison: 2021 
at 2106 hours The cardiomediastinal contours are stable. The lung volumes remain
low. Central pulmonary vascular anastomotic and left greater than right 
parenchymal and pleural opacities are unchanged. There is no pneumothorax. 
Support lines are stable. Signed: Dhruv Werner MDReport Verified Date/Time: 
2021 02:44:40 Electronically signed by: DHRUV WERNER M.D. on 2021
02:44 AMTHROMBOELASTOGRAPH (TEG)2021 21:45:00





             Test Item    Value        Reference Range Interpretation Comments

 

             TEG ACTIVATED CLOTTING TIME 8.1 minutes  4.0-7.0      H            



             (BEAKER) (test code = 1407)                                        

 

             TEG FIBRINOGEN ACTIVITY (BEAKER) 53.8 degrees 61.0-73.0    L       

     



             (test code = 1408)                                        

 

             TEG PLT. AGGREGATION (BEAKER) 69.3 MM      55.0-65.0    H          

  



             (test code = 1409)                                        

 

             TGH ACTIVATED CLOTTING TIME 8.2 minutes  4.0-7.0      H            



             (BEAKER) (test code = 1411)                                        

 

             TGH FIBRINOGEN ACTIVITY (BEAKER) 54.1 degrees 61.0-73.0    L       

     



             (test code = 1412)                                        

 

             TGH PLT. AGGREGATION (BEAKER) 69.3 MM      55.0-65.0    H          

  



             (test code = 1413)                                        



RAD, CHEST, 1 VIEW, NON [id number] 21:31:00Reason for exam:-&gt;Large chest 
tube bleedingShould this be performed at the bedside?-&gt;Yes
********************************************************[facility] 1965 Sex: 
M************************************************************FINAL REPORT 
PATIENT ID: [id number] RAD, CHEST, 1 VIEW, NON DEPT INDICATION: Large chest tube 
bleeding COMPARISON: Same day's examination. FINDINGS: Portable frontal view of 
the chest. IMPRESSION: Support Lines: Shah interval repositioning of the left 
pleural pigtail catheter may be in part related to differences in patient 
positioning. Otherwise unchanged support apparatus. Lungs and pleura: Improved 
aeration of the left lung with persistent hazy airspace opacity and moderate 
left pleural effusion. Worsening hazy airspace opacity in the right lung base 
may represent increasing right pleural effusion versus differences in patient 
positioning. No pneumothorax.Heart and mediastinum: Largely obscured however kip
ssly unchanged. Additional findings: None. Signed: Deandra Angulo 
Verified Date/Time: 2021 21:31:19 Electronically signed by: DEANDRA ANGULO MD on 2021 09:31 PMSARS-COV2/RT-PCR ([facility]; REF LAB[facility])
2021 21:18:00





             Test Item    Value        Reference Range Interpretation Comments

 

             SARS-COV2/RT-PCR (test code Negative     Not Detected, Negative,   

           



             = [id number])                See external report for              



                                       linked test               

 

             SARS-COV-2 PERFORMING LAB [facility]                                  



             (test code = 7466921)                                        



Negative results do not preclude SARS-CoV-2 infection and should not be used as 
the sole basis for patient management decisions. Negative results must be 
combined with clinical observations, patient history, and epidemiological 
information. A false negative result may occur if a specimen is improperly
collected, transported or handled.The limit of detection for this assay is 250 
copies/mL.This SARS CoV-2 test is a rapid, real-time RT-PCR test intended for 
the qualitative detection of nucleic acid from SARS-CoV-2 in a nasopharyngeal 
swab specimen collected from individuals suspected of COVID-19 by their 
healthcare provider.This test has not been Food and Drug Administration (FDA) 
cleared or approved and has been authorized by FDA under an Emergency Use 
Authorization (EUA). This EUA will be effective until the declaration that 
circumstances exist justifying the authorization of the emergency use of in 
vitro diagnostic tests for detection and/or diagnosis of COVID-19 is terminated 
under Section 564(b)(2) of the Act or the EUA is revoked under Section 564(g) of
the Act.Fact Sheet for Healthcare Pro
viders:https://www.[employer].com/Documents/Xpert%20Xpress%20SARS%20CoV-2/Fact%20Sh

eets/302-3802%16WAIU-WQG-6%20HEALTHCARE%20PROVIDERS%20FACT%20SHEET.pdfFact Sheet
for Healthcare Patients:https://www.[employer]/Documents/Xpert%20Xpress%20SARS%20CoV-2/Fact%20Sheets/302-3801%20SARS-COV

-2%20PATIENT%20FACT%20SHEET.pdfPerforming Laboratory:[place] [address], [place] [address]CNFHBDERLEX TIME/[id number] 21:14:00





             Test Item    Value        Reference Range Interpretation Comments

 

             PROTIME (BEAKER) 15.9 seconds 11.9-14.2    H            



             (test code = 759)                                        

 

             INR (BEAKER) (test 1.31         See_Comment                [Automat

ed message]



             code = 370)                                         The system [employer]



                                                                 generated this 

result



                                                                 transmitted ref

erence



                                                                 range: <=5.90. 

The



                                                                 reference range

 was



                                                                 not used to int

erpret



                                                                 this result as



                                                                 normal/abnormal

.



Effective 2019: PT Reference Range ChangeNew: 11.9-14.2 Previous: 11.7-
14.7RECOMMENDED COUMADIN/WARFARIN INR THERAPY RANGESSTANDARD DOSE: 2.0-3.0 
Includes: PROPHYLAXIS for venous thrombosis, systemic embolization; TREATMENT 
for venous thrombosis and/or pulmonary embolus.HIGH RISK: Target INR is 2.5-3.5 
for patients wiht mechanical heart valves.[id number] 21:14:00





             Test Item    Value        Reference Range Interpretation Comments

 

             FIBRINOGEN LEVEL (BEAKER) (test 761 mg/dl    225-434      H        

    



             code = 658)                                         



[id number] 21:14:00





             Test Item    Value        Reference Range Interpretation Comments

 

             PARTIAL THROMBOPLASTIN TIME 32.2 seconds 22.5-36.0                 



             (BEAKER) (test code = 760)                                        



OXYGEN SATURATION, [id number] 21:08:00





             Test Item    Value        Reference Range Interpretation Comments

 

             O2 SATURATION (MEASURED) (BEAKER) 75.5 %                           

      



             (test code = 1455)                                        



CBC (HEMOGRAM ONLY)2021 21:06:00





             Test Item    Value        Reference Range Interpretation Comments

 

             WHITE BLOOD CELL COUNT (BEAKER) 6.4 K/ L     3.5-10.5              

    



             (test code = 775)                                        

 

             RED BLOOD CELL COUNT (BEAKER) 2.41 M/ L    4.63-6.08    L          

  



             (test code = 761)                                        

 

             HEMOGLOBIN (BEAKER) (test code = 7.9 GM/DL    13.7-17.5    L       

     



             410)                                                

 

             HEMATOCRIT (BEAKER) (test code = 24.8 %       40.1-51.0    L       

     



             411)                                                

 

             MEAN CORPUSCULAR VOLUME (BEAKER) 102.9 fL     79.0-92.2    H       

     



             (test code = 753)                                        

 

             MEAN CORPUSCULAR HEMOGLOBIN 32.8 pg      25.7-32.2    H            



             (BEAKER) (test code = 751)                                        

 

             MEAN CORPUSCULAR HEMOGLOBIN CONC 31.9 GM/DL   32.3-36.5    L       

     



             (BEAKER) (test code = 752)                                        

 

             RED CELL DISTRIBUTION WIDTH 16.2 %       11.6-14.4    H            



             (BEAKER) (test code = 412)                                        

 

             PLATELET COUNT (BEAKER) (test code 54 K/CU MM   150-450      L     

       



             = 756)                                              

 

             MEAN PLATELET VOLUME (BEAKER) 9.7 fL       9.4-12.4                

  



             (test code = 754)                                        

 

             NUCLEATED RED BLOOD CELLS (BEAKER) 0 /100 WBC   0-0                

       



             (test code = 413)                                        



RAD, CHEST, 1 VIEW, NON [id number] 15:40:00Reason for exam:-&gt;PA Catheter 
PlacementShould this be performed at the bedside?-&gt;Yes
************************************************************[facility] 1965 Sex: 
M************************************************************FINAL REPORT 
PATIENT ID: [id number] RAD, CHEST, 1 VIEW, NON DEPT TECHNIQUE: Frontal view(s) of 
the chest. INDICATION: PA Catheter Placement. COMPARISON: Chest radiograph 2 
hours prior FINDINGS/IMPRESSION: Lines/Tubes: Right sided [place]-Jw catheter, 
tip projects over the expected location of the main pulmonary orright 
ventricular outflow tract. Left transjugular catheter tip in the left 
brachiocephalic vein. Otherwise lines and tubes are unchanged Lungs/pleura: No 
change in the complete left lung opacity. No pneumothorax. Heart and 
Mediastinum: Unchanged. Soft Tissues and Bones: Unchanged. Signed: Dorothea Paz Verified Date/Time: 2021 15:40:19 Reading Location: [facility] C013X 
Ortho Consult ReadingRoom Electronically signed by: DOROTHEA PAZ MD on 
2021 03:40 PMBASIC METABOLIC [id number] 15:32:00





             Test Item    Value        Reference Range Interpretation Comments

 

             SODIUM (BEAKER) 137 meq/L    136-145                   



             (test code = 381)                                        

 

             POTASSIUM (BEAKER) 4.5 meq/L    3.5-5.1                   



             (test code = 379)                                        

 

             CHLORIDE (BEAKER) 104 meq/L                        



             (test code = 382)                                        

 

             CO2 (BEAKER) (test 23 meq/L     22-29                     



             code = 355)                                         

 

             BLOOD UREA NITROGEN 21 mg/dL     7-21                      



             (BEAKER) (test code                                        



             = 354)                                              

 

             CREATININE (BEAKER) 0.79 mg/dL   0.57-1.25                 



             (test code = 358)                                        

 

             GLUCOSE RANDOM 145 mg/dL           H            



             (BEAKER) (test code                                        



             = 652)                                              

 

             CALCIUM (BEAKER) 7.8 mg/dL    8.4-10.2     L            



             (test code = 697)                                        

 

             EGFR (BEAKER) (test 102 mL/min/1.73                           ESTIM

ATED GFR IS



             code = 1092) sq m                                   NOT AS ACCURATE

 AS



                                                                 CREATININE



                                                                 CLEARANCE IN



                                                                 PREDICTING



                                                                 GLOMERULAR



                                                                 FILTRATION RATE

.



                                                                 ESTIMATED GFR I

S



                                                                 NOT APPLICABLE 

FOR



                                                                 DIALYSIS PATIEN

TS.



 ID - DBCBC W/PLT COUNT &amp; AUTO [id number] 15:20:00





             Test Item    Value        Reference Range Interpretation Comments

 

             WHITE BLOOD CELL COUNT (BEAKER) 6.5 K/ L     3.5-10.5              

    



             (test code = 775)                                        

 

             RED BLOOD CELL COUNT (BEAKER) 2.10 M/ L    4.63-6.08    L          

  



             (test code = 761)                                        

 

             HEMOGLOBIN (BEAKER) (test code = 7.0 GM/DL    13.7-17.5    L       

     



             410)                                                

 

             HEMATOCRIT (BEAKER) (test code = 22.5 %       40.1-51.0    L       

     



             411)                                                

 

             MEAN CORPUSCULAR VOLUME (BEAKER) 107.1 fL     79.0-92.2    H       

     



             (test code = 753)                                        

 

             MEAN CORPUSCULAR HEMOGLOBIN 33.3 pg      25.7-32.2    H            



             (BEAKER) (test code = 751)                                        

 

             MEAN CORPUSCULAR HEMOGLOBIN CONC 31.1 GM/DL   32.3-36.5    L       

     



             (BEAKER) (test code = 752)                                        

 

             RED CELL DISTRIBUTION WIDTH 14.0 %       11.6-14.4                 



             (BEAKER) (test code = 412)                                        

 

             PLATELET COUNT (BEAKER) (test code 72 K/CU MM   150-450      L     

       



             = 756)                                              

 

             MEAN PLATELET VOLUME (BEAKER) 10.3 fL      9.4-12.4                

  



             (test code = 754)                                        

 

             NUCLEATED RED BLOOD CELLS (BEAKER) 0 /100 WBC   0-0                

       



             (test code = 413)                                        



OXYGEN SATURATION, [id number] 14:56:00





             Test Item    Value        Reference Range Interpretation Comments

 

             O2 SATURATION (MEASURED) (BEAKER) 86.9 %                           

      



             (test code = 1455)                                        



VANCOMYCIN LEVEL, [id number] 13:20:00





             Test Item    Value        Reference Range Interpretation Comments

 

             VANCOMYCIN TROUGH (BEAKER) (test 12.9 ug/mL   10.0-20.0            

     



             code = 522)                                         



 ID - [id number] 13:16:00





             Test Item    Value        Reference Range Interpretation Comments

 

             D-DIMER QUANTITATIVE (BEAKER) 6.87 MG/L FEU <0.50        H         

   



             (test code = 671)                                        



Intended Use: The D-Dimer Assay can be used to aid in the diagnosis of Deep Vein
Thrombosis (DVT) and Pulmonary Embolism Disease (PED).In patients with low pre-
test probability, various studies concerning STA Liatest D-dimer test have 
reported that with a cutoff value of 0.50 MG/L FEU, the Negative Predictive 
Value (NPV) regarding the exclusion of thrombosis is within % range.RAD, 
CHEST, 1 VIEW, NON [id number] 13:09:00Reason for exam:-&gt;cvl 
placementShould this be performed at the bedside?-&gt;Yes
************************************************************[facility] : 1965 Sex: 
M************************************************************FINAL REPORT 
PATIENT ID: [id number] RAD, CHEST, 1 VIEW, NON DEPT TECHNIQUE: Frontal view(s) of 
the chest. INDICATION: cvl placement. COMPARISON: Chest radiograph 6 hours prior
FINDINGS/IMPRESSION: Lines/Tubes: Endotracheal tube tip approximately 5.8 cm 
above the maryann. Left transjugular catheter tip near the brachiocephalic vein 
junction. Right transjugular catheter tip in the lower SVC. Nasogastric tube 
terminates outside the field-of-view. Lungs/pleura: Complete opacification of 
the left hemithorax. Increasing right basal opacity. Likely small right and 
large left pleural effusions. No pneumothorax. Heart and Mediastinum: Unchanged.
Soft Tissues and Bones: Unchanged. Signed: Dorothea Paz MDReport Verified Gera
e/Time: 2021 13:09:32 Reading Location: [facility] B1 C013X Ortho Consult Reading
Room Electronicallysigned by: DOROTHEA PAZ MD on 2021 01:09 PMBLOOD 
GAS, [id number] 12:35:00





             Test Item    Value        Reference Range Interpretation Comments

 

             PH ARTERIAL (BEAKER) (test code = 7.33         7.35-7.45    L      

      



             383)                                                

 

             PCO2 ARTERIAL (BEAKER) (test code 48 mm Hg     35-45        H      

      



             = 384)                                              

 

             PO2 ARTERIAL (BEAKER) (test code 185 mm Hg    80-90        H       

     



             = 385)                                              

 

             O2 SATURATION ARTERIAL (BEAKER) 99.2 %       96.0-97.0    H        

    



             (test code = 386)                                        

 

             HCO3 ARTERIAL (BEAKER) (test code 25 mmol/L    21-29               

      



             = 388)                                              

 

             BASE EXCESS ARTERIAL (BEAKER) -1.1 mmol/L  -2.0-3.0                

  



             (test code = 387)                                        

 

             PATIENT TEMPERATURE (BEAKER) 37.6                                  

 



             (test code = 1818)                                        

 

             FIO2 (BEAKER) (test code = 1819) 100.0                             

     



BODY FLUID CULTURE + GRAM [id number] 12:01:00





             Test Item    Value        Reference Range Interpretation Comments

 

             CULTURE (BEAKER) (test code No growth                              



             = 1095)                                             

 

             GRAM STAIN RESULT (BEAKER) 1+ WBCs                                



             (test code = 1123)                                        

 

             GRAM STAIN RESULT (BEAKER) No organisms seen                       

    



             (test code = 08741)                                        



[id number] 10:34:00





             Test Item    Value        Reference Range Interpretation Comments

 

             CORTISOL, TOTAL (BEAKER) (test 23.1 ug/dL   3.7-19.4     H         

   



             code = 2755)                                        



 ID - EDASILACTIC ACID, [id number] 07:51:00





             Test Item    Value        Reference Range Interpretation Comments

 

             LACTATE BLOOD ARTERIAL (2) 0.9 mmol/L   0.5-2.2                   



             (BEAKER) (test code = 2874)                                        



 ID - EDASIOXYGEN SATURATION, [id number] 07:47:00





             Test Item    Value        Reference Range Interpretation Comments

 

             O2 SATURATION (MEASURED) (BEAKER) 90.6 %                           

      



             (test code = 1455)                                        



BLOOD GAS, [id number] 07:45:00





             Test Item    Value        Reference Range Interpretation Comments

 

             PH ARTERIAL (BEAKER) (test code = 7.32         7.35-7.45    L      

      



             383)                                                

 

             PCO2 ARTERIAL (BEAKER) (test code 49 mm Hg     35-45        H      

      



             = 384)                                              

 

             PO2 ARTERIAL (BEAKER) (test code 207 mm Hg    80-90        H       

     



             = 385)                                              

 

             O2 SATURATION ARTERIAL (BEAKER) 99.3 %       96.0-97.0    H        

    



             (test code = 386)                                        

 

             HCO3 ARTERIAL (BEAKER) (test code 24 mmol/L    21-29               

      



             = 388)                                              

 

             BASE EXCESS ARTERIAL (BEAKER) -1.6 mmol/L  -2.0-3.0                

  



             (test code = 387)                                        

 

             PATIENT TEMPERATURE (BEAKER) 37.7                                  

 



             (test code = 1818)                                        

 

             FIO2 (BEAKER) (test code = 1819) 60.0                              

     



HEPATIC FUNCTION [id number] 07:35:00





             Test Item    Value        Reference Range Interpretation Comments

 

             TOTAL PROTEIN (BEAKER) (test code = 5.6 gm/dL    6.0-8.3      L    

        



             770)                                                

 

             ALBUMIN (BEAKER) (test code = 1145) 2.5 g/dL     3.5-5.0      L    

        

 

             BILIRUBIN TOTAL (BEAKER) (test code 0.8 mg/dL    0.2-1.2           

        



             = 377)                                              

 

             BILIRUBIN DIRECT (BEAKER) (test 0.6 mg/dL    0.1-0.5      H        

    



             code = 706)                                         

 

             ALKALINE PHOSPHATASE (BEAKER) (test 168 U/L             H    

        



             code = 346)                                         

 

             AST (SGOT) (BEAKER) (test code = 46 U/L       5-34         H       

     



             353)                                                

 

             ALT (SGPT) (BEAKER) (test code = 112 U/L      6-55         H       

     



             347)                                                



 ID - EDASICBC W/PLT COUNT &amp; AUTO [id number] 07:29:00





             Test Item    Value        Reference Range Interpretation Comments

 

             WHITE BLOOD CELL COUNT (BEAKER) 7.5 K/ L     3.5-10.5              

    



             (test code = 775)                                        

 

             RED BLOOD CELL COUNT (BEAKER) 2.44 M/ L    4.63-6.08    L          

  



             (test code = 761)                                        

 

             HEMOGLOBIN (BEAKER) (test code = 8.2 GM/DL    13.7-17.5    L       

     



             410)                                                

 

             HEMATOCRIT (BEAKER) (test code = 25.8 %       40.1-51.0    L       

     



             411)                                                

 

             MEAN CORPUSCULAR VOLUME (BEAKER) 105.7 fL     79.0-92.2    H       

     



             (test code = 753)                                        

 

             MEAN CORPUSCULAR HEMOGLOBIN 33.6 pg      25.7-32.2    H            



             (BEAKER) (test code = 751)                                        

 

             MEAN CORPUSCULAR HEMOGLOBIN CONC 31.8 GM/DL   32.3-36.5    L       

     



             (BEAKER) (test code = 752)                                        

 

             RED CELL DISTRIBUTION WIDTH 14.0 %       11.6-14.4                 



             (BEAKER) (test code = 412)                                        

 

             PLATELET COUNT (BEAKER) (test code 26 K/CU MM   150-450      L     

       



             = 756)                                              

 

             MEAN PLATELET VOLUME (BEAKER) 10.6 fL      9.4-12.4                

  



             (test code = 754)                                        

 

             NUCLEATED RED BLOOD CELLS (BEAKER) 0 /100 WBC   0-0                

       



             (test code = 413)                                        



(CELLAVISION MANUAL DIFF)2021 07:29:00





             Test Item    Value        Reference Range Interpretation Comments

 

             NEUTROPHILS - REL 73 %                                   



             (CELLAVISION)(BEAKER) (test code =                                 

       



             2816)                                               

 

             LYMPHOCYTES - REL 8 %                                    



             (CELLAVISION)(BEAKER) (test code =                                 

       



             2817)                                               

 

             MONOCYTES - REL 5 %                                    



             (CELLAVISION)(BEAKER) (test code =                                 

       



             2818)                                               

 

             BASOPHILS - REL 1 %                                    



             (CELLAVISION)(BEAKER) (test code =                                 

       



             2820)                                               

 

             MYELOCYTES - REL 1 %          0-0          H            



             (CELLAVISION)(BEAKER) (test code =                                 

       



             2822)                                               

 

             BANDS - REL (CELLAVISION)(BEAKER) 12 %         0-10         H      

      



             (test code = 2826)                                        

 

             NEUTROPHILS - ABS 5.48 K/ul    1.78-5.38    H            



             (CELLAVISION)(BEAKER) (test code =                                 

       



             2830)                                               

 

             LYMPHOCYTES - ABS 0.60 K/ul    1.32-3.57    L            



             (CELLAVISION)(BEAKER) (test code =                                 

       



             2831)                                               

 

             MONOCYTES - ABS 0.38 K/uL    0.30-0.82                 



             (CELLAVISION)(BEAKER) (test code =                                 

       



             2832)                                               

 

             BASOPHILS - ABS 0.08 K/uL    0.01-0.08                 



             (CELLAVISION)(BEAKER) (test code =                                 

       



             2835)                                               

 

             MYELOCYTES-ABS 0.08 K/uL    0.00-0.00    H            



             (CELLAVISION)(BEAKER) (test code =                                 

       



             2837)                                               

 

             BANDS - ABS (CELLAVISION)(BEAKER) 0.90 K/uL    0.00-0.80    H      

      



             (test code = 2840)                                        

 

             TOTAL COUNTED (BEAKER) (test code 100                              

      



             = 1351)                                             

 

             MANUAL NRBC  CELLS (BEAKER) 1 /100 WBC   0-0          H     

       



             (test code = 1353)                                        

 

             RBC MORPHOLOGY (BEAKER) (test code Normal                          

       



             = 762)                                              

 

             PLT MORPHOLOGY (BEAKER) (test code Normal                          

       



             = 486)                                              

 

             DOHLE BODIES (BEAKER) (test code = Present                         

       



             359)                                                

 

             ARTIFACT (CELLAVISION)(BEAKER) Present                             

   



             (test code = 3432)                                        

 

             PLATELET CONCENTRATION Decreased                              



             (CELLAVISION)(BEAKER) (test code =                                 

       



             3438)                                               



 ID - [id number] ID - Margarita OverholtUser comments: Slide comments:RAD, 
CHEST, 1 VIEW, NON [id number] 07:26:00Reason for exam:-&gt;loculated pleural
effusion s/p pigtailShould this be performed at the bedside?-&gt;Yes
************************************************************[facility] 1965 Sex: 
M************************************************************FINAL REPORT 
PATIENT ID: [id number] EXAMINATION: RAD, CHEST, 1 VIEW, NON DEPT. INDICATION: 
56-year-old male withloculated pleural effusion status post pigtail chest tube 
placement. COMPARISON: Chest radiograph dated 2021. FINDINGS:A nontunneled 
central venous catheter is noted in the right internal jugular vein with tip 
overlying the mid right atrium. An endotracheal tube is noted terminating 
approximately4.8 cm above the maryann. An enteric tube is noted terminating below
the level of the diaphragms. A pigtail chest tube is noted overlying the left 
lower hemothorax. The cardiomediastinal silhouette is slightly deviated to the 
right. Complete opacification of the left hemithorax, progressed from prior ex
amination. No pneumothorax. No acute osseous abnormality. Visualized soft 
tissues are unremarkable. IMPRESSION:1.Lines and tubes as above.2.Complete 
opacification of the left hemithorax, progressed from prior examination. Signed:
Lazarus Donaldson MDReport Verified Date/Time: 2021 07:26:35 Reading Location: 
[address] Body Reading Room Electronically signed by: LAZARUS DONALDSON MD on 2021 07:26 [id number] 04:32:00





             Test Item    Value        Reference Range Interpretation Comments

 

             MAGNESIUM (BEAKER) (test code = 2.2 mg/dL    1.6-2.6               

    



             627)                                                



 ID - [id number] 04:32:00





             Test Item    Value        Reference Range Interpretation Comments

 

             PHOSPHORUS (BEAKER) (test code = 5.0 mg/dL    2.3-4.7      H       

     



             604)                                                



 ID - EDASIBASIC METABOLIC [id number] 04:32:00





             Test Item    Value        Reference Range Interpretation Comments

 

             SODIUM (BEAKER) 134 meq/L    136-145      L            



             (test code = 381)                                        

 

             POTASSIUM (BEAKER) 4.0 meq/L    3.5-5.1                   



             (test code = 379)                                        

 

             CHLORIDE (BEAKER) 100 meq/L                        



             (test code = 382)                                        

 

             CO2 (BEAKER) (test 24 meq/L     22-29                     



             code = 355)                                         

 

             BLOOD UREA NITROGEN 15 mg/dL     7-21                      



             (BEAKER) (test code                                        



             = 354)                                              

 

             CREATININE (BEAKER) 0.78 mg/dL   0.57-1.25                 



             (test code = 358)                                        

 

             GLUCOSE RANDOM 122 mg/dL           H            



             (BEAKER) (test code                                        



             = 652)                                              

 

             CALCIUM (BEAKER) 7.8 mg/dL    8.4-10.2     L            



             (test code = 697)                                        

 

             EGFR (BEAKER) (test 104 mL/min/1.73                           ESTIM

ATED GFR IS



             code = 1092) sq m                                   NOT AS ACCURATE

 AS



                                                                 CREATININE



                                                                 CLEARANCE IN



                                                                 PREDICTING



                                                                 GLOMERULAR



                                                                 FILTRATION RATE

.



                                                                 ESTIMATED GFR I

S



                                                                 NOT APPLICABLE 

FOR



                                                                 DIALYSIS PATIEN

TS.



 ID - EDASICBC (HEMOGRAM ONLY)2021 20:43:00





             Test Item    Value        Reference Range Interpretation Comments

 

             WHITE BLOOD CELL COUNT (BEAKER) 6.3 K/ L     3.5-10.5              

    



             (test code = 775)                                        

 

             RED BLOOD CELL COUNT (BEAKER) 2.20 M/ L    4.63-6.08    L          

  



             (test code = 761)                                        

 

             HEMOGLOBIN (BEAKER) (test code = 7.6 GM/DL    13.7-17.5    L       

     



             410)                                                

 

             HEMATOCRIT (BEAKER) (test code = 22.9 %       40.1-51.0    L       

     



             411)                                                

 

             MEAN CORPUSCULAR VOLUME (BEAKER) 104.1 fL     79.0-92.2    H       

     



             (test code = 753)                                        

 

             MEAN CORPUSCULAR HEMOGLOBIN 34.5 pg      25.7-32.2    H            



             (BEAKER) (test code = 751)                                        

 

             MEAN CORPUSCULAR HEMOGLOBIN CONC 33.2 GM/DL   32.3-36.5            

     



             (BEAKER) (test code = 752)                                        

 

             RED CELL DISTRIBUTION WIDTH 13.9 %       11.6-14.4                 



             (BEAKER) (test code = 412)                                        

 

             PLATELET COUNT (BEAKER) (test code 51 K/CU MM   150-450      L     

       



             = 756)                                              

 

             MEAN PLATELET VOLUME (BEAKER) 10.3 fL      9.4-12.4                

  



             (test code = 754)                                        

 

             NUCLEATED RED BLOOD CELLS (BEAKER) 0 /100 WBC   0-0                

       



             (test code = 413)                                        



BASIC METABOLIC [id number] 20:42:00





             Test Item    Value        Reference Range Interpretation Comments

 

             SODIUM (BEAKER) 136 meq/L    136-145                   



             (test code = 381)                                        

 

             POTASSIUM (BEAKER) 3.9 meq/L    3.5-5.1                   Specimen 

moderately



             (test code = 379)                                        hemolyzed

 

             CHLORIDE (BEAKER) 101 meq/L                        



             (test code = 382)                                        

 

             CO2 (BEAKER) (test 29 meq/L     22-29                     



             code = 355)                                         

 

             BLOOD UREA NITROGEN 10 mg/dL     7-21                      



             (BEAKER) (test code                                        



             = 354)                                              

 

             CREATININE (BEAKER) 0.59 mg/dL   0.57-1.25                 Specimen

 moderately



             (test code = 358)                                        hemolyzed

 

             GLUCOSE RANDOM 119 mg/dL           H            



             (BEAKER) (test code                                        



             = 652)                                              

 

             CALCIUM (BEAKER) 7.6 mg/dL    8.4-10.2     L            



             (test code = 697)                                        

 

             EGFR (BEAKER) (test 143 mL/min/1.73                           ESTIM

ATED GFR IS



             code = 1092) sq m                                   NOT AS ACCURATE

 AS



                                                                 CREATININE



                                                                 CLEARANCE IN



                                                                 PREDICTING



                                                                 GLOMERULAR



                                                                 FILTRATION RATE

.



                                                                 ESTIMATED GFR I

S



                                                                 NOT APPLICABLE 

FOR



                                                                 DIALYSIS PATIEN

TS.



 ID - [id number] 20:41:00





             Test Item    Value        Reference Range Interpretation Comments

 

             MAGNESIUM (BEAKER) 2.2 mg/dL    1.6-2.6                   Specimen 

moderately



             (test code = 627)                                        hemolyzed



 ID - DBHEPATIC FUNCTION [id number] 20:41:00





             Test Item    Value        Reference Range Interpretation Comments

 

             TOTAL PROTEIN (BEAKER) 5.7 gm/dL    6.0-8.3      L            Speci

men moderately



             (test code = 770)                                        hemolyzed

 

             ALBUMIN (BEAKER) (test 2.5 g/dL     3.5-5.0      L            Speci

men moderately



             code = 1145)                                        hemolyzed

 

             BILIRUBIN TOTAL 0.6 mg/dL    0.2-1.2                   Specimen mod

erately



             (BEAKER) (test code =                                        hemoly

zed



             377)                                                

 

             BILIRUBIN DIRECT 0.2 mg/dL    0.1-0.5                   Specimen mo

derately



             (BEAKER) (test code =                                        hemoly

zed



             706)                                                

 

             ALKALINE PHOSPHATASE 186 U/L             H            



             (BEAKER) (test code =                                        



             346)                                                

 

             AST (SGOT) (BEAKER) 80 U/L       5-34         H            Specimen

 moderately



             (test code = 353)                                        hemolyzed

 

             ALT (SGPT) (BEAKER) 142 U/L      6-55         H            Specimen

 moderately



             (test code = 347)                                        hemolyzed



 ID - DBURINALYSIS W/ REFLEX URINE [id number] 18:29:00





             Test Item    Value        Reference Range Interpretation Comments

 

             COLOR (BEAKER) (test code = 470) Yellow                            

     

 

             CLARITY (BEAKER) (test code = 469) Clear                           

       

 

             SPECIFIC GRAVITY UA (BEAKER) (test 1.032        1.001-1.035        

       



             code = 468)                                         

 

             PH UA (BEAKER) (test code = 467) 6.0          5.0-8.0              

     

 

             PROTEIN UA (BEAKER) (test code = 50 mg/dL     Negative     A       

     



             464)                                                

 

             GLUCOSE UA (BEAKER) (test code = Negative     Negative             

     



             365)                                                

 

             KETONES UA (BEAKER) (test code = Negative     Negative             

     



             371)                                                

 

             BILIRUBIN UA (BEAKER) (test code = Negative     Negative           

       



             462)                                                

 

             BLOOD UA (BEAKER) (test code = 461) Large        Negative     A    

        

 

             NITRITE UA (BEAKER) (test code = Negative     Negative             

     



             465)                                                

 

             LEUKOCYTE ESTERASE UA (BEAKER) Negative     Negative               

   



             (test code = 466)                                        

 

             UROBILINOGEN UA (BEAKER) (test code 0.2 mg/dL    0.2-1.0           

        



             = 463)                                              

 

             RBC UA (BEAKER) (test code = 519) 170 /HPF                         

      

 

             WBC UA (BEAKER) (test code = 520) 0 /HPF                           

      

 

             SQUAMOUS EPITHELIAL (BEAKER) (test < /HPF                          

       



             code = 516)                                         

 

             SOURCE(BEAKER) (test code = 2795)                                  

      



 ID - [auto] ID - techOXYGEN SATURATION, [id number] 
18:04:00





             Test Item    Value        Reference Range Interpretation Comments

 

             O2 SATURATION (MEASURED) (BEAKER) 92.8 %                           

      



             (test code = 1455)                                        



BASIC METABOLIC [id number] 13:33:00





             Test Item    Value        Reference Range Interpretation Comments

 

             SODIUM (BEAKER) 134 meq/L    136-145      L            



             (test code = 381)                                        

 

             POTASSIUM (BEAKER) 3.6 meq/L    3.5-5.1                   Specimen 

slightly



             (test code = 379)                                        hemolyzed

 

             CHLORIDE (BEAKER) 99 meq/L                         



             (test code = 382)                                        

 

             CO2 (BEAKER) (test 26 meq/L     22-29                     



             code = 355)                                         

 

             BLOOD UREA NITROGEN 12 mg/dL     7-21                      



             (BEAKER) (test code                                        



             = 354)                                              

 

             CREATININE (BEAKER) 0.65 mg/dL   0.57-1.25                 Specimen

 slightly



             (test code = 358)                                        hemolyzed

 

             GLUCOSE RANDOM 144 mg/dL           H            



             (BEAKER) (test code                                        



             = 652)                                              

 

             CALCIUM (BEAKER) 7.7 mg/dL    8.4-10.2     L            



             (test code = 697)                                        

 

             EGFR (BEAKER) (test 128 mL/min/1.73                           ESTIM

ATED GFR IS



             code = 1092) sq m                                   NOT AS ACCURATE

 AS



                                                                 CREATININE



                                                                 CLEARANCE IN



                                                                 PREDICTING



                                                                 GLOMERULAR



                                                                 FILTRATION RATE

.



                                                                 ESTIMATED GFR I

S



                                                                 NOT APPLICABLE 

FOR



                                                                 DIALYSIS PATIEN

TS.



 ID - [id number] 13:10:00





             Test Item    Value        Reference Range Interpretation Comments

 

             MAGNESIUM (BEAKER) 2.1 mg/dL    1.6-2.6                   Specimen 

slightly



             (test code = 627)                                        hemolyzed



 ID - EDASIVANCOMYCIN LEVEL, [id number] 13:08:00





             Test Item    Value        Reference Range Interpretation Comments

 

             VANCOMYCIN TROUGH (BEAKER) (test 9.5 ug/mL    10.0-20.0    L       

     



             code = 522)                                         



 ID - EDASIRAD, CHEST, 1 VIEW, NON [id number] 13:06:00Reason for 
exam:-&gt;post bronchoscopy and reposition ETTShould this be performed at the 
bedside?-&gt;Yes************************************************************[facility] 1965 Sex: 
M************************************************************FINAL REPORT 
PATIENT ID: [id number] RAD, CHEST, 1 VIEW, NON DEPT TECHNIQUE: Frontal view(s) of 
the chest. INDICATION: post bronchoscopy and reposition ETT. COMPARISON: Chest 
radiograph 2 hours prior FINDINGS/IMPRESSION: Lines/Tubes: Endotracheal tube tip
 5.9 cm above maryann. Nasogastric tube terminates outside the field-of-view 
below the diaphragm. Unchanged right transjugular catheter. The linear tube 
projecting over the left lower thorax is no longer seen. The pigtail catheter 
projecting over the left upper abdomen or left lower thorax is grossly 
unchanged, may be kinked. Lungs/pleura: No convincing change in left parenchymal
 opacities and pleural effusion. No pneumothorax. Heart and Mediastinum: 
Unchanged.Soft Tissues and Bones: Unchanged. Signed: Dorothea Paz 
Verified Date/Time: 2021 13:06:57 Reading Location: [address] 
Consult Reading Room  Electronically signed by: DOROTHEA PAZ MD on 
2021 01:06 PMCBC (HEMOGRAM ONLY)2021 12:55:00





             Test Item    Value        Reference Range Interpretation Comments

 

             WHITE BLOOD CELL COUNT (BEAKER) 7.6 K/ L     3.5-10.5              

    



             (test code = 775)                                        

 

             RED BLOOD CELL COUNT (BEAKER) 2.13 M/ L    4.63-6.08    L          

  



             (test code = 761)                                        

 

             HEMOGLOBIN (BEAKER) (test code = 7.3 GM/DL    13.7-17.5    L       

     



             410)                                                

 

             HEMATOCRIT (BEAKER) (test code = 21.8 %       40.1-51.0    L       

     



             411)                                                

 

             MEAN CORPUSCULAR VOLUME (BEAKER) 102.3 fL     79.0-92.2    H       

     



             (test code = 753)                                        

 

             MEAN CORPUSCULAR HEMOGLOBIN 34.3 pg      25.7-32.2    H            



             (BEAKER) (test code = 751)                                        

 

             MEAN CORPUSCULAR HEMOGLOBIN CONC 33.5 GM/DL   32.3-36.5            

     



             (BEAKER) (test code = 752)                                        

 

             RED CELL DISTRIBUTION WIDTH 13.6 %       11.6-14.4                 



             (BEAKER) (test code = 412)                                        

 

             PLATELET COUNT (BEAKER) (test code 42 K/CU MM   150-450      L     

       



             = 756)                                              

 

             MEAN PLATELET VOLUME (BEAKER) 10.8 fL      9.4-12.4                

  



             (test code = 754)                                        

 

             NUCLEATED RED BLOOD CELLS (BEAKER) 0 /100 WBC   0-0                

       



             (test code = 413)                                        



BLOOD GAS, [id number] 12:15:00





             Test Item    Value        Reference Range Interpretation Comments

 

             PH ARTERIAL (BEAKER) (test code = 7.46         7.35-7.45    H      

      



             383)                                                

 

             PCO2 ARTERIAL (BEAKER) (test code 37 mm Hg     35-45               

      



             = 384)                                              

 

             PO2 ARTERIAL (BEAKER) (test code = 194 mm Hg    80-90        H     

       



             385)                                                

 

             O2 SATURATION ARTERIAL (BEAKER) 99.4 %       96.0-97.0    H        

    



             (test code = 386)                                        

 

             HCO3 ARTERIAL (BEAKER) (test code 26 mmol/L    21-29               

      



             = 388)                                              

 

             BASE EXCESS ARTERIAL (BEAKER) 2.3 mmol/L   -2.0-3.0                

  



             (test code = 387)                                        

 

             PATIENT TEMPERATURE (BEAKER) (test 37.4                            

       



             code = 1818)                                        

 

             FIO2 (BEAKER) (test code = 1819) 60.0                              

     



POCT-GLUCOSE [id number] 12:11:00





             Test Item    Value        Reference Range Interpretation Comments

 

             POC-GLUCOSE METER 139 mg/dL           H            : TESTED A

T [facility] [address]



             (BEAKER) (test code =                                        [place],



             1538)                                               [id number]:



                                                                 /Techni

ayla ID



                                                                 = [id number] for Regis burnett



                                                                 (contract), Dae seen



RAD, CHEST, 1 VIEW, NON [id number] 11:25:00Reason for exam:-&gt;Lung 
expansionShould this be performed at the bedside?-&gt;Yes
************************************************************[facility] 1965 Sex: 
M************************************************************FINAL REPORT 
PATIENT ID: [id number] RAD, CHEST, 1 VIEW, NON DEPT, RAD, CHEST, 1 VIEW, NON DEPT 
TECHNIQUE: Frontal view(s) of the chest at 0531 and 1010 hours on 2020. 
INDICATION: Lung expansion. COMPARISON: 2021 chest radiograph 
FINDINGS/IMPRESSION: Lines/Tubes: Endotracheal tube with tip 5.9 cm above the 
maryann, right transjugular catheter with tip at the lower SVC, and nasogastric 
tube with tip in the stomach, and a left-sided chest tube have been placed in 
the interval between the 0531 and 1010 hour films. Lungs/pleura: Partially 
atelectatic left lung with multifocal opacities, no change from 2021. 
Unchanged left pleural effusion. No pneumothorax. Heart and Mediastinum: 
Cardiomegaly. Soft Tissues and Bones: Unchanged. Signed: Dorothea Paz 
Verified Date/Time: 2021 11:25:41 Reading Location: [address] 
Consult Reading Room Electronically signed by: DOROTHEA PAZ MD on 
2021 11:25 AMRAD, CHEST, 1 VIEW, NON [id number] 11:25:00Reason for 
exam:-&gt;s/p intubation and right IJ CVC placementShould this be performed at 
the bedside?-&gt;Yes
************************************************************[facility] : 1965 Sex: 
M************************************************************FINAL REPORT 
PATIENT ID: [id number] RAD, CHEST, 1 VIEW, NON DEPT, RAD, CHEST, 1 VIEW, NON DEPT 
TECHNIQUE: Frontal view(s) of the chest at 0531 and 1010 hours on 2020. 
INDICATION: Lung expansion. COMPARISON: 2021 chest radiograph 
FINDINGS/IMPRESSION: Lines/Tubes: Endotracheal tube with tip 5.9 cm above the 
maryann, right transjugular catheter with tip at the lower SVC, and nasogastric 
tube with tip in the stomach, and a left-sided chest tube have been placed in 
the interval between the 0531 and 1010 hour films. Lungs/pleura: Partially 
atelectatic left lung with multifocal opacities, no change from 2021. 
Unchanged left pleural effusion. No pneumothorax. Heart and Mediastinum: 
Cardiomegaly. Soft Tissues and Bones: Unchanged. Signed: Dorothea Paz 
Verified Date/Time: 2021 11:25:41 Reading Location: [address] 
Consult Reading Room Electronically signed by: DOROTHEA PAZ MD on 
2021 11:25 AMRAD, ABDOMEN/KUB, 1 VIEW [id number] 11:11:00Reason for 
exam:-&gt;NG tube insertionShould this be performed at the bedside?-&gt;Yes
************************************************************[facility] : 1965 Sex: 
M************************************************************FINAL REPORT 
PATIENT ID: [id number] EXAMINATION: RAD, ABDOMEN/KUB, 1 VIEW AP. INDICATION: 
56-year-old male status post nasogastric tube placement. COMPARISON: CTA chest 
abdomen and pelvis dated 4/15/2021. FINDINGS:An enteric tube is noted with tip 
overlying the gastric fundus. Gas is scattered throughout the bowel. The colon 
is mildly dilated, measuring up to 8 cm. No evidence of pneumatosis or portal 
venous gas. No pathologic calcifications. No acute osseous abnormality. A 
catheter overlies the pelvis. IMPRESSION:1.An enteric tube is noted with tip 
overlying the gastric fundus.2.Mild dilatation of the colon. Signed: Lazarus Donaldson MDReport Verified Date/Time: 2021 11:11:36 Reading Location: [facility] 
C013Y CT Body Reading Room Electronically signed by: LAZARUS DONALDSON MD on 
2021 11:11 AMLACTATE DEHYDROGENASE (LDH), BODY [id number] 09:49:00





             Test Item    Value        Reference Range Interpretation Comments

 

             LACTATE DEHYDROGENASE FLUID (BEAKER) 1005 U/L                      

         



             (test code = 634)                                        



Absence of reference range indicates that normals have not been defined.Assay 
performance has not been validated for this type of specimen. ID - 
[id number] METABOLIC [id number] 04:42:00





             Test Item    Value        Reference Range Interpretation Comments

 

             SODIUM (BEAKER) 133 meq/L    136-145      L            



             (test code = 381)                                        

 

             POTASSIUM (BEAKER) 3.5 meq/L    3.5-5.1                   



             (test code = 379)                                        

 

             CHLORIDE (BEAKER) 98 meq/L                         



             (test code = 382)                                        

 

             CO2 (BEAKER) (test 24 meq/L     22-29                     



             code = 355)                                         

 

             BLOOD UREA NITROGEN 12 mg/dL     7-21                      



             (BEAKER) (test code                                        



             = 354)                                              

 

             CREATININE (BEAKER) 0.69 mg/dL   0.57-1.25                 



             (test code = 358)                                        

 

             GLUCOSE RANDOM 150 mg/dL           H            



             (BEAKER) (test code                                        



             = 652)                                              

 

             CALCIUM (BEAKER) 8.1 mg/dL    8.4-10.2     L            



             (test code = 697)                                        

 

             EGFR (BEAKER) (test 119 mL/min/1.73                           ESTIM

ATED GFR IS



             code = 1092) sq m                                   NOT AS ACCURATE

 AS



                                                                 CREATININE



                                                                 CLEARANCE IN



                                                                 PREDICTING



                                                                 GLOMERULAR



                                                                 FILTRATION RATE

.



                                                                 ESTIMATED GFR I

S



                                                                 NOT APPLICABLE 

FOR



                                                                 DIALYSIS PATIEN

TS.



 ID - [id number] 04:42:00





             Test Item    Value        Reference Range Interpretation Comments

 

             MAGNESIUM (BEAKER) (test code = 2.0 mg/dL    1.6-2.6               

    



             627)                                                



 ID - [id number] 04:42:00





             Test Item    Value        Reference Range Interpretation Comments

 

             PHOSPHORUS (BEAKER) (test code = 3.2 mg/dL    2.3-4.7              

     



             604)                                                



 ID - EDASICBC W/PLT COUNT &amp; AUTO [id number] 04:27:00





             Test Item    Value        Reference Range Interpretation Comments

 

             WHITE BLOOD CELL COUNT (BEAKER) 7.0 K/ L     3.5-10.5              

    



             (test code = 775)                                        

 

             RED BLOOD CELL COUNT (BEAKER) 2.52 M/ L    4.63-6.08    L          

  



             (test code = 761)                                        

 

             HEMOGLOBIN (BEAKER) (test code = 8.5 GM/DL    13.7-17.5    L       

     



             410)                                                

 

             HEMATOCRIT (BEAKER) (test code = 25.6 %       40.1-51.0    L       

     



             411)                                                

 

             MEAN CORPUSCULAR VOLUME (BEAKER) 101.6 fL     79.0-92.2    H       

     



             (test code = 753)                                        

 

             MEAN CORPUSCULAR HEMOGLOBIN 33.7 pg      25.7-32.2    H            



             (BEAKER) (test code = 751)                                        

 

             MEAN CORPUSCULAR HEMOGLOBIN CONC 33.2 GM/DL   32.3-36.5            

     



             (BEAKER) (test code = 752)                                        

 

             RED CELL DISTRIBUTION WIDTH 13.4 %       11.6-14.4                 



             (BEAKER) (test code = 412)                                        

 

             PLATELET COUNT (BEAKER) (test code 35 K/CU MM   150-450      L     

       



             = 756)                                              

 

             MEAN PLATELET VOLUME (BEAKER) 10.6 fL      9.4-12.4                

  



             (test code = 754)                                        

 

             NUCLEATED RED BLOOD CELLS (BEAKER) 0 /100 WBC   0-0                

       



             (test code = 413)                                        

 

             NEUTROPHILS RELATIVE PERCENT 65 %                                  

 



             (BEAKER) (test code = 429)                                        

 

             LYMPHOCYTES RELATIVE PERCENT 20 %                                  

 



             (BEAKER) (test code = 430)                                        

 

             MONOCYTES RELATIVE PERCENT 13 %                                   



             (BEAKER) (test code = 431)                                        

 

             EOSINOPHILS RELATIVE PERCENT 0 %                                   

 



             (BEAKER) (test code = 432)                                        

 

             BASOPHILS RELATIVE PERCENT 0 %                                    



             (BEAKER) (test code = 437)                                        

 

             NEUTROPHILS ABSOLUTE COUNT 4.57 K/ L    1.78-5.38                 



             (BEAKER) (test code = 670)                                        

 

             LYMPHOCYTES ABSOLUTE COUNT 1.43 K/ L    1.32-3.57                 



             (BEAKER) (test code = 414)                                        

 

             MONOCYTES ABSOLUTE COUNT (BEAKER) 0.88 K/ L    0.30-0.82    H      

      



             (test code = 415)                                        

 

             EOSINOPHILS ABSOLUTE COUNT 0.01 K/ L    0.04-0.54    L            



             (BEAKER) (test code = 416)                                        

 

             BASOPHILS ABSOLUTE COUNT (BEAKER) 0.01 K/ L    0.01-0.08           

      



             (test code = 417)                                        

 

             IMMATURE GRANULOCYTES-RELATIVE 2 %          0-1          H         

   



             PERCENT (BEAKER) (test code =                                      

  



             2801)                                               



BLOOD GAS, [id number] 04:09:00





             Test Item    Value        Reference Range Interpretation Comments

 

             PH VENOUS (BEAKER) (test code = 7.51         7.32-7.42    H        

    



             701)                                                

 

             PCO2 VENOUS (BEAKER) (test code = 32 mm Hg     41-51        L      

      



             755)                                                

 

             PO2 VENOUS (BEAKER) (test code = 81 mm Hg     25-40        H       

     



             702)                                                

 

             O2 SATURATION VENOUS (BEAKER) 96.9 %       40.0-70.0    H          

  



             (test code = 703)                                        

 

             HCO3 VENOUS (BEAKER) (test code = 25 mmol/L    21-29               

      



             705)                                                

 

             BASE EXCESS VENOUS (BEAKER) (test 2.2 mmol/L   -2.0-3.0            

      



             code = 704)                                         

 

             PATIENT TEMPERATURE (BEAKER) (test 37.0                            

       



             code = 1818)                                        



POCT-GLUCOSE [id number] 18:37:00





             Test Item    Value        Reference Range Interpretation Comments

 

             POC-GLUCOSE METER 146 mg/dL           H            : Notified

 RN/MD:



             (JOSE G) (test code =                                        TESTED

 AT [facility] [address] 6052)                                               [facility],



                                                                 [id number]:



                                                                 /Techni

ayla ID



                                                                 = [id number] for [facility]



                                                                 (contract), Sandi shine



[id number] 15:09:00Medical Cytology Report Case: [id number]  Authorizing 
Provider: Kirt Vasquez Collected: 04/15/2021 05:54 PM ELVIA Huitron  
Ordering Location: [address] Received: 2021 08:50 AM Pathologist: 
uMrray Zamora MD Specimen: Pleural PLEURAL FLUID (LATERALITY NOT 
SPECIFIED) (CYTOSPINS): - NEGATIVE FOR MALIGNANCY Signing Pathologist Direct 
Phone Line: [phone] signedby Murray Zamora MD on 
2021 at 3:09 [id number] effusion, empyemaPLEURAL FLUID (LATERALITY NOT 
SPECIFIED)10 mls yellow fluid; 4 cytospinsPerformed. [facility], Department of Pathology, [address] [place] [address]
 [address], Tel [phone] [facility], Department of 
Pathology, [address] [place] [address], Tel [phone] Developmental Center, Department of Pathology, [address] [place] [address]
 [address], Tel [phone] FLUID CELL COUNT WITH [id number] 
14:06:00





             Test Item    Value        Reference Range Interpretation Comments

 

             APPEARANCE FLUID Clear        Clear                     



             (BEAKER) (test code =                                        



             510)                                                

 

             COLOR FLUID (BEAKER) Yellow       Colorless, Straw A            



             (test code = 511)                                        

 

             RBC FLUID (BEAKER) 427 /cu mm   See_Comment  H             [Automat

ed message]



             (test code = 513)                                        The system

 which



                                                                 generated this 

result



                                                                 transmitted ref

erence



                                                                 range: <=1. The



                                                                 reference range

 was



                                                                 not used to int

erpret



                                                                 this result as



                                                                 normal/abnormal

.

 

             ADJUSTED WBC FLUID 14 /cu mm    See_Comment  H             [Automat

ed message]



             (BEAKER) (test code =                                        The 

stem which



             1693)                                               generated this 

result



                                                                 transmitted ref

erence



                                                                 range: <=5. The



                                                                 reference range

 was



                                                                 not used to int

erpret



                                                                 this result as



                                                                 normal/abnormal

.

 

             LINING CELLS (BEAKER) 0 /cu mm     See_Comment                [Auto

mated message]



             (test code = 1590)                                        The syste

m which



                                                                 generated this 

result



                                                                 transmitted ref

erence



                                                                 range: <=1. The



                                                                 reference range

 was



                                                                 not used to int

erpret



                                                                 this result as



                                                                 normal/abnormal

.

 

             NEUTROPHILS FLUID 94 %                                   



             (BEAKER) (test code =                                        



             1656)                                               

 

             LYMPHS FLUID (BEAKER) 6 %                                    



             (test code = 488)                                        

 

             MONO/MACROPHAGE FLUID 0 %                                    



             (BEAKER) (test code =                                        



             489)                                                

 

             EOSINOPHILS FLUID 0 %                                    



             (BEAKER) (test code =                                        



             491)                                                

 

             BASO FLUID (BEAKER) 0 %                                    



             (test code = 492)                                        

 

             CONTAINER BODY FLUID EDTA Tube                              



             (BEAKER) (test code =                                        



             2873)                                               



POCT-GLUCOSE [id number] 12:46:00





             Test Item    Value        Reference Range Interpretation Comments

 

             POC-GLUCOSE METER 147 mg/dL           H            : Notified

 RN/MD:



             (BEAKER) (test code =                                        TESTED

 AT [facility] [address]



             1538)                                               [facility],



                                                                 [address]:



                                                                 /Techni

ayla ID



                                                                 = [id number] for ARLEEN RIVERA



TSH/FREE T4 IF [id number] 10:25:00





             Test Item    Value        Reference Range Interpretation Comments

 

             THYROID STIMULATING HORMONE 1.646 uIU/mL 0.350-4.940               



             (BEAKER) (test code = 772)                                        



 ID - BRUNA GUTIERREZ, CHEST, 1 VIEW, NON [id number] 09:41:00Reason for 
exam:-&gt;lung expansionShould this be performed at the bedside?-&gt;Yes
************************************************************[facility] 1965 Sex: 
M************************************************************FINAL REPORT 
PATIENT ID: [id number] CLINICAL HISTORY: lung expansion TECHNIQUE: 1 view of the 
chest. COMPARISON:4/15/2021 IMPRESSION: A left lower pigtail catheter is again 
seen. Diffuse left hemithorax pleural-parenchymal opacity is grossly unchanged. 
Blunting of the right costophrenic angle is noted. The cardiomediastinal 
silhouette is magnified by technique. Signed: Adriana Hitchcock Verified 
Date/Time:2021 09:41:57 Reading Location: [facility] Radiology Reading 
Room Electronically signed by:ADRIANA HITCHCOCK M.D. on 2021 09:41 AM
PROTEIN, BODY [id number] 09:18:00





             Test Item    Value        Reference Range Interpretation Comments

 

             PROTEIN FLUID (BEAKER) (test code = 5.0 g/dL                       

        



             579)                                                



Absence of reference range indicates that normals have not been defined.Assay 
performance has not been validated for this type of specimen. ID - 
[id number] 06:20:00





             Test Item    Value        Reference Range Interpretation Comments

 

             FERRITIN (BEAKER) (test code = 1703.28 ng/mL 5..00  H        

    



             361)                                                



 ID - EDASIFOLATE, [id number] 06:20:00





             Test Item    Value        Reference Range Interpretation Comments

 

             FOLATE (BEAKER) 6.10 ng/mL   See_Comment  L             [Automated 

message] The



             (test code = 362)                                        system [employer] generated



                                                                 this result tra

nsmitted



                                                                 reference range

: >=7.00.



                                                                 The reference r

carla was



                                                                 not used to int

erpret



                                                                 this result as



                                                                 normal/abnormal

.



 ID - EDASIPOCT-GLUCOSE [id number] 05:50:00





             Test Item    Value        Reference Range Interpretation Comments

 

             POC-GLUCOSE METER 121 mg/dL           H            : TESTED A

T [facility] [address]



             (AppscioAKER) (test code =                                        [place],



             1538)                                               [address]:



                                                                 /Techni

ayla ID



                                                                 = [id number] for Ad

ams,



                                                                 Kimetra



VITAMIN [id number] 04:34:00





             Test Item    Value        Reference Range Interpretation Comments

 

             VITAMIN B12 (BEAKER) (test code = 526 pg/mL    213-815 774)                                                



 ID - EDASIHEPATITIS C [id number] 04:24:00





             Test Item    Value        Reference Range Interpretation Comments

 

             HEPATITIS C ANTIBODY (BEAKER) Nonreactive  Nonreactive             

  



             (test code = 367)                                        



 ID - DBHIV-1 ANTIGEN WITH HIV-1/2 [id number] 04:24:00





             Test Item    Value        Reference Range Interpretation Comments

 

             HIV-1 ANTIGEN WITH HIV 1\T\2 Nonreactive  Nonreactive              

 



             ANTIBODY (2) (BEAKER) (test code                                   

     



             = 2586)                                             



 ID - DBVANCOMYCIN LEVEL, [id number] 04:22:00





             Test Item    Value        Reference Range Interpretation Comments

 

             VANCOMYCIN TROUGH (BEAKER) (test 3.7 ug/mL    10.0-20.0    L       

     



             code = 522)                                         



 ID - DBBASIC METABOLIC [id number] 04:08:00





             Test Item    Value        Reference Range Interpretation Comments

 

             SODIUM (BEAKER) 136 meq/L    136-145                   



             (test code = 381)                                        

 

             POTASSIUM (BEAKER) 3.7 meq/L    3.5-5.1                   



             (test code = 379)                                        

 

             CHLORIDE (BEAKER) 99 meq/L                         



             (test code = 382)                                        

 

             CO2 (BEAKER) (test 27 meq/L     22-29                     



             code = 355)                                         

 

             BLOOD UREA NITROGEN 12 mg/dL     7-21                      



             (BEAKER) (test code                                        



             = 354)                                              

 

             CREATININE (BEAKER) 0.65 mg/dL   0.57-1.25                 



             (test code = 358)                                        

 

             GLUCOSE RANDOM 142 mg/dL           H            



             (BEAKER) (test code                                        



             = 652)                                              

 

             CALCIUM (BEAKER) 8.2 mg/dL    8.4-10.2     L            



             (test code = 697)                                        

 

             EGFR (BEAKER) (test 128 mL/min/1.73                           ESTIM

ATED GFR IS



             code = 1092) sq m                                   NOT AS ACCURATE

 AS



                                                                 CREATININE



                                                                 CLEARANCE IN



                                                                 PREDICTING



                                                                 GLOMERULAR



                                                                 FILTRATION RATE

.



                                                                 ESTIMATED GFR I

S



                                                                 NOT APPLICABLE 

FOR



                                                                 DIALYSIS PATIEN

TS.



 ID - [id number] 04:08:00





             Test Item    Value        Reference Range Interpretation Comments

 

             MAGNESIUM (BEAKER) (test code = 2.2 mg/dL    1.6-2.6               

    



             627)                                                



 ID - [id number] 04:08:00





             Test Item    Value        Reference Range Interpretation Comments

 

             PHOSPHORUS (BEAKER) (test code = 2.9 mg/dL    2.3-4.7              

     



             604)                                                



 ID - EDASIIRON, TIBC, % SAT. (WITHOUT FERRITIN)2021 04:03:00





             Test Item    Value        Reference Range Interpretation Comments

 

             IRON (BEAKER) (test code = 547) 28.0 ug/dL   40.0-160.0   L        

    

 

             TOTAL IRON BINDING CAPACITY 185 ug/dL    250-450      L            



             (BEAKER) (test code = 769)                                        

 

             IRON % SATURATION (2) (BEAKER) 15 %         20-55        L         

   



             (test code = 2590)                                        



 ID - DBCBC W/PLT COUNT &amp; AUTO [id number] 03:54:00





             Test Item    Value        Reference Range Interpretation Comments

 

             WHITE BLOOD CELL COUNT (BEAKER) 5.6 K/ L     3.5-10.5              

    



             (test code = 775)                                        

 

             RED BLOOD CELL COUNT (BEAKER) 2.42 M/ L    4.63-6.08    L          

  



             (test code = 761)                                        

 

             HEMOGLOBIN (BEAKER) (test code = 8.2 GM/DL    13.7-17.5    L       

     



             410)                                                

 

             HEMATOCRIT (BEAKER) (test code = 24.9 %       40.1-51.0    L       

     



             411)                                                

 

             MEAN CORPUSCULAR VOLUME (BEAKER) 102.9 fL     79.0-92.2    H       

     



             (test code = 753)                                        

 

             MEAN CORPUSCULAR HEMOGLOBIN 33.9 pg      25.7-32.2    H            



             (BEAKER) (test code = 751)                                        

 

             MEAN CORPUSCULAR HEMOGLOBIN CONC 32.9 GM/DL   32.3-36.5            

     



             (BEAKER) (test code = 752)                                        

 

             RED CELL DISTRIBUTION WIDTH 13.7 %       11.6-14.4                 



             (BEAKER) (test code = 412)                                        

 

             PLATELET COUNT (BEAKER) (test code 51 K/CU MM   150-450      L     

       



             = 756)                                              

 

             MEAN PLATELET VOLUME (BEAKER) 9.7 fL       9.4-12.4                

  



             (test code = 754)                                        

 

             NUCLEATED RED BLOOD CELLS (BEAKER) 0 /100 WBC   0-0                

       



             (test code = 413)                                        

 

             NEUTROPHILS RELATIVE PERCENT 66 %                                  

 



             (BEAKER) (test code = 429)                                        

 

             LYMPHOCYTES RELATIVE PERCENT 21 %                                  

 



             (BEAKER) (test code = 430)                                        

 

             MONOCYTES RELATIVE PERCENT 11 %                                   



             (BEAKER) (test code = 431)                                        

 

             EOSINOPHILS RELATIVE PERCENT 1 %                                   

 



             (BEAKER) (test code = 432)                                        

 

             BASOPHILS RELATIVE PERCENT 0 %                                    



             (BEAKER) (test code = 437)                                        

 

             NEUTROPHILS ABSOLUTE COUNT 3.71 K/ L    1.78-5.38                 



             (BEAKER) (test code = 670)                                        

 

             LYMPHOCYTES ABSOLUTE COUNT 1.16 K/ L    1.32-3.57    L            



             (BEAKER) (test code = 414)                                        

 

             MONOCYTES ABSOLUTE COUNT (BEAKER) 0.64 K/ L    0.30-0.82           

      



             (test code = 415)                                        

 

             EOSINOPHILS ABSOLUTE COUNT 0.03 K/ L    0.04-0.54    L            



             (BEAKER) (test code = 416)                                        

 

             BASOPHILS ABSOLUTE COUNT (BEAKER) 0.01 K/ L    0.01-0.08           

      



             (test code = 417)                                        

 

             IMMATURE GRANULOCYTES-RELATIVE 1 %          0-1                    

   



             PERCENT (BEAKER) (test code =                                      

  



             2801)                                               



SARS-COV2/RT-PCR ([facility])2021 03:49:00





             Test Item    Value        Reference Range Interpretation Comments

 

             SARS-COV2/RT-PCR (test Negative     Not Detected, Negative,        

      



             code = 0352095)              See external report for              



                                       linked test               

 

             SARS-COV-2 PERFORMING LAB [facility] VITO                             



             (test code = 0985122)                                        



Negative result for this test determines that SARS-CoV-2 RNA was not present in 
the specimen above the Limit of Detection (LOD). However, Negative results do 
not preclude SARS-CoV-2 infection and should not be used as the sole basis for 
treatment or patient management decisions. Negative results must be combined 
with clinical observations, patient history, and epidemiological information. A 
false negative result may occur if a specimen is improperly collected, 
transported or handled. A false negative result should be considered if 
patient's recent exposures or clinical presentation indicate that COVID-19 
(SARS-CoV-2) is likely and diagnostic tests for other causes of illness are 
negative. Re-testing should be considered in cases of suspected false 
negatives.The limit of detection for this assay is 100 copies/mL.This SARS CoV-2
test is a real-time RT-PCR test intended for the qualitative detection of 
nucleic acid from SARS-CoV-2 in a nasopharyngeal swab specimen collected from 
individuals suspected of COVID-19 by their healthcare provider.This test has not
been Food and Drug Administration (FDA) cleared or approved. This is a modified 
version of an approved Emergency Use Authorization (EUA) and is in the process 
of review by the FDA. Once authorized by the FDA, the issued EUA will be 
effective until the declaration that circumstances exist justifying the 
authorization of the emergency use ofin vitro diagnostic tests for detection 
and/or diagnosis of COVID-19 is terminated under Section 564(b)(2) of the Act or
the EUA is revoked under Section 564(g) of the Act.Testing was performed using 
the Abbott SARS-CoV-2 assay.Fact Sheet for Healthcare 
Providers:https://www.[employer].abbott/sal/RT_SAR
B-AhM-3_VSB_Ldor_Ddjsg_70-267613.pdfFact Sheet for Healthcare 
Patients:https://www.[employer].abbott/s
al/TS_KZKB-NkF-1_Ouybdlv_Gibm_Krtod_YP_01-083801K2.pdfPerforming 
Laboratory:[facility] [place] [address]
POCT-GLUCOSE [id number] 00:05:00





             Test Item    Value        Reference Range Interpretation Comments

 

             POC-GLUCOSE METER 146 mg/dL           H            : TESTED A

T [facility] [address]



             (BEAKER) (test code =                                        [facility] [place] [facility],



             1538)                                               [address]:



                                                                 /Techni

ayla ID



                                                                 = [id number] for Ad

amsYuniel



RETICULOCYTE [id number] 23:42:00





             Test Item    Value        Reference Range Interpretation Comments

 

             RETICULOCYTE COUNT PCT (BEAKER) (test 1.5 %        0.5-1.8         

          



             code = 575)                                         



 ID - 6000HEPATIC FUNCTION PANEL2021-04-15 18:18:00





             Test Item    Value        Reference Range Interpretation Comments

 

             TOTAL PROTEIN (BEAKER) (test code = 6.1 gm/dL    6.0-8.3           

        



             770)                                                

 

             ALBUMIN (BEAKER) (test code = 1145) 2.9 g/dL     3.5-5.0      L    

        

 

             BILIRUBIN TOTAL (BEAKER) (test code 0.6 mg/dL    0.2-1.2           

        



             = 377)                                              

 

             BILIRUBIN DIRECT (BEAKER) (test 0.4 mg/dL    0.1-0.5               

    



             code = 706)                                         

 

             ALKALINE PHOSPHATASE (BEAKER) (test 113 U/L                  

        



             code = 346)                                         

 

             AST (SGOT) (BEAKER) (test code = 23 U/L       5-34                 

     



             353)                                                

 

             ALT (SGPT) (BEAKER) (test code = 24 U/L       6-55                 

     



             347)                                                



 ID - DBLACTATE DEHYDROGENASE (LDH)2021-04-15 18:18:00





             Test Item    Value        Reference Range Interpretation Comments

 

             LACTATE DEHYDROGENASE (BEAKER) (test 139 U/L      125-220          

         



             code = 635)                                         



 ID - DBGLUCOSE2021-04-15 18:15:00





             Test Item    Value        Reference Range Interpretation Comments

 

             GLUCOSE RANDOM (BEAKER) (test code 164 mg/dL           H     

       



             = 652)                                              



 ID - ADMINLACTATE DEHYDROGENASE (LDH)2021-04-15 18:15:00





             Test Item    Value        Reference Range Interpretation Comments

 

             LACTATE DEHYDROGENASE (BEAKER) (test 140 U/L      125-220          

         



             code = 635)                                         



 ID - ADMINAMYLASE, BODY FLUID2021-04-15 18:10:00





             Test Item    Value        Reference Range Interpretation Comments

 

             AMYLASE FLUID (BEAKER) (test code = 22 U/L                         

        



             350)                                                



Absence of reference range indicates that normals have not been defined.Assay 
performance has not been validated for this type of specimen.CBC (HEMOGRAM ONLY)
2021-04-15 17:59:00





             Test Item    Value        Reference Range Interpretation Comments

 

             WHITE BLOOD CELL COUNT (BEAKER) 6.4 K/ L     3.5-10.5              

    



             (test code = 775)                                        

 

             RED BLOOD CELL COUNT (BEAKER) 2.46 M/ L    4.63-6.08    L          

  



             (test code = 761)                                        

 

             HEMOGLOBIN (BEAKER) (test code = 8.4 GM/DL    13.7-17.5    L       

     



             410)                                                

 

             HEMATOCRIT (BEAKER) (test code = 25.2 %       40.1-51.0    L       

     



             411)                                                

 

             MEAN CORPUSCULAR VOLUME (BEAKER) 102.4 fL     79.0-92.2    H       

     



             (test code = 753)                                        

 

             MEAN CORPUSCULAR HEMOGLOBIN 34.1 pg      25.7-32.2    H            



             (BEAKER) (test code = 751)                                        

 

             MEAN CORPUSCULAR HEMOGLOBIN CONC 33.3 GM/DL   32.3-36.5            

     



             (BEAKER) (test code = 752)                                        

 

             RED CELL DISTRIBUTION WIDTH 14.0 %       11.6-14.4                 



             (BEAKER) (test code = 412)                                        

 

             PLATELET COUNT (BEAKER) (test code 56 K/CU MM   150-450      L     

       



             = 756)                                              

 

             MEAN PLATELET VOLUME (BEAKER) 9.6 fL       9.4-12.4                

  



             (test code = 754)                                        

 

             NUCLEATED RED BLOOD CELLS (BEAKER) 0 /100 WBC   0-0                

       



             (test code = 413)                                        



POCT-GLUCOSE [id number] 17:56:00





             Test Item    Value        Reference Range Interpretation Comments

 

             POC-GLUCOSE METER 163 mg/dL           H            : TESTED A

T [facility] [address]



             (BEAKER) (test code =                                        [place],



             [address]38)                                               [id number]:



                                                                 /Techni

ayla ID



                                                                 = [id number] for Pauly Madden



HIGH SENSITIVITY TROPONIN -04-15 16:15:00





             Test Item    Value        Reference Range Interpretation Comments

 

             HIGH SENSITIVITY 5 pg/ml      See_Comment                [Automated

 message]



             TROPONIN I (test code =                                        The 

system which



             2574628)                                            generated this 

result



                                                                 transmitted ref

erence



                                                                 range: <=35. Th

e



                                                                 reference range

 was not



                                                                 used to interpr

et this



                                                                 result as



                                                                 normal/abnormal

.



 ID - DBThe  STAT High Sensitivity Troponin-I results should be
used in conjunctionwith other diagnostic information such as ECG, clinical 
observations and information, and patient symptoms to aid in the diagnosis of 
MI.RAD, CHEST, 1 VIEW, NON DEPT2021-04-15 16:11:00Reason for exam:-&gt;s/p left 
pleural drainageShould this be performed at the bedside?-&gt;Yes
********************************************************[facility] 1965 Sex: 
M************************************************************FINAL REPORT 
PATIENT ID: [id number] CLINICAL HISTORY: s/p left pleural drainage TECHNIQUE: 1 
view of the chest. COMPARISON: 2021 IMPRESSION: There is a new left basilar
pigtail catheter. Diffuse left hemithorax pleural-parenchymal opacity is overall
grossly unchanged. The right lung remains free of infiltrates or effusions. The 
cardiac mediastinal silhouette is unchanged. Signed: Adriana Hitchcock MDReport 
Verified Date/Time: 04/15/2021 16:11:00 Reading Location: [facility] 
Radiology Reading Room Electronically signed by: ADRIANA HITCHCOCK M.D. on 
04/15/2021 04:11 PMU/S, DRAINAGE, CHEST, WITH TUBE [id number] 15:01:00
Reason for exam:-&gt;Loculated L effusion
************************************************************[facility] : 1965 Sex: 
M************************************************************FINAL REPORT 
PATIENT ID: [id number] Ultrasound-guided left pleural drainage History: Loculated 
left pleural effusion Sedation: None. : Ric Kamara MD. 
Assistant: None. Approach: Left posterior chest wall, percutaneous Estimated 
blood loss: &lt; 5 cc. Specimen: 40 cc of serous appearing fluid initially 
aspirated, samples left at the bedside. Technique: Informed written consent was 
obtained. Discussion of risks, benefits, and alternatives were made with the 
patient. The patient expressed understanding and agreed to proceed. A universal 
timeout was performed prior to starting the procedure. All elements maximal 
sterile barrier technique was utilized for this procedure, including utilization
of sterile scrub solution for skin prep, a large sterile sheet to cover the 
areas of the patient that were not prepped, and hand hygiene, mask, head 
covering, and sterile gown for performing radiologist and scrub technologist. 
Initial ultrasound evaluation demonstrates loculated appearing left pleural effu
jenny. 2% lidocaine was used for local anesthesia. Using ultrasound guidance, 
following acquisition of permanent images, a 5 French one-step catheter was 
advanced into the left pleural space. There is aspiration of serous appearing 
fluid. Subsequently, a 0.035 inch wire was advanced through the catheter and 
coiled within the left pleural space. The tract was dilated and a 8 French 
drainage catheter was advanced over wire with pigtail formed within the left 
pleural space. The catheter spaces skin withsilk suture and attached to an 
atrium. A sterile dressing was applied. The patient tolerated the procedure 
without immediate complication. Impression: Successful ultrasound-guided left 
pleural drainagewith placement of 8 French catheter and initial aspiration of 
serous appearing fluid. Signed: Ric Kamaraeport Verified Date/Time: 
04/15/2021 15:01:45 Reading Location: [address] Body Reading Room 
Electronically signed by: RIC KAMARA MD on 04/15/2021 03:01 PMPERIPHERAL 
BLOOD SMEAR - HOLD ONLY2021-04-15 14:25:00





             Test Item    Value        Reference Range Interpretation Comments

 

             PERIPHERAL SMEAR SAVE (BEAKER) (test save                          

         



             code = 1815)                                        



POCT-GLUCOSE [id number] 11:41:00





             Test Item    Value        Reference Range Interpretation Comments

 

             POC-GLUCOSE METER 119 mg/dL           H            : TESTED A

T [facility] [address]



             (BEAKER) (test code =                                        [place],



             [address])                                               [id number]:



                                                                 /Techni

ayla ID



                                                                 = [id number] for Pauly Mdaden



HIGH SENSITIVITY TROPONIN -04-15 10:47:00





             Test Item    Value        Reference Range Interpretation Comments

 

             HIGH SENSITIVITY 4 pg/ml      See_Comment                [Automated

 message]



             TROPONIN I (test code =                                        The 

system which



             [id number])                                            generated this 

result



                                                                 transmitted ref

erence



                                                                 range: <=35. Th

e



                                                                 reference range

 was not



                                                                 used to interpr

et this



                                                                 result as



                                                                 normal/abnormal

.



 ID - PIAYA LThe  STAT High Sensitivity Troponin-I results 
should be used in conjunction with other diagnostic information such as ECG, 
clinical observations and information, and patient symptoms to aid in the 
diagnosis of MI.CBC (HEMOGRAM ONLY)2021-04-15 10:26:00





             Test Item    Value        Reference Range Interpretation Comments

 

             WHITE BLOOD CELL COUNT 7.9 K/ L     3.5-10.5                  



             (BEAKER) (test code = 775)                                        

 

             RED BLOOD CELL COUNT 2.53 M/ L    4.63-6.08    L            



             (BEAKER) (test code = 761)                                        

 

             HEMOGLOBIN (BEAKER) (test 8.7 GM/DL    13.7-17.5    L            



             code = 410)                                         

 

             HEMATOCRIT (BEAKER) (test 25.8 %       40.1-51.0    L            



             code = 411)                                         

 

             MEAN CORPUSCULAR VOLUME 102.0 fL     79.0-92.2    H            



             (BEAKER) (test code = 753)                                        

 

             MEAN CORPUSCULAR 34.4 pg      25.7-32.2    H            



             HEMOGLOBIN (BEAKER) (test                                        



             code = 751)                                         

 

             MEAN CORPUSCULAR 33.7 GM/DL   32.3-36.5                 



             HEMOGLOBIN CONC (BEAKER)                                        



             (test code = 752)                                        

 

             RED CELL DISTRIBUTION 14.1 %       11.6-14.4                 



             WIDTH (BEAKER) (test code                                        



             = 412)                                              

 

             PLATELET COUNT (BEAKER) 67 K/CU MM   150-450      L            POST

 TRANSFUSION



             (test code = 756)                                        

 

             MEAN PLATELET VOLUME 9.6 fL       9.4-12.4                  



             (BEAKER) (test code = 754)                                        

 

             NUCLEATED RED BLOOD CELLS 0 /100 WBC   0-0                       



             (BEAKER) (test code = 413)                                        



CBC W/PLT COUNT &amp; AUTO DIFFERENTIAL2021-04-15 09:36:00





             Test Item    Value        Reference Range Interpretation Comments

 

             WHITE BLOOD CELL COUNT (BEAKER) 8.1 K/ L     3.5-10.5              

    



             (test code = 775)                                        

 

             RED BLOOD CELL COUNT (BEAKER) 2.83 M/ L    4.63-6.08    L          

  



             (test code = 761)                                        

 

             HEMOGLOBIN (BEAKER) (test code = 9.5 GM/DL    13.7-17.5    L       

     



             410)                                                

 

             HEMATOCRIT (BEAKER) (test code = 29.4 %       40.1-51.0    L       

     



             411)                                                

 

             MEAN CORPUSCULAR VOLUME (BEAKER) 103.9 fL     79.0-92.2    H       

     



             (test code = 753)                                        

 

             MEAN CORPUSCULAR HEMOGLOBIN 33.6 pg      25.7-32.2    H            



             (BEAKER) (test code = 751)                                        

 

             MEAN CORPUSCULAR HEMOGLOBIN CONC 32.3 GM/DL   32.3-36.5            

     



             (BEAKER) (test code = 752)                                        

 

             RED CELL DISTRIBUTION WIDTH 14.1 %       11.6-14.4                 



             (BEAKER) (test code = 412)                                        

 

             PLATELET COUNT (BEAKER) (test code 18 K/CU MM   150-450      L     

       



             = 756)                                              

 

             MEAN PLATELET VOLUME (BEAKER) 10.4 fL      9.4-12.4                

  



             (test code = 754)                                        

 

             NUCLEATED RED BLOOD CELLS (BEAKER) 0 /100 WBC   0-0                

       



             (test code = 413)                                        



(MANUAL DIFFERENTIAL)2021-04-15 09:36:00





             Test Item    Value        Reference Range Interpretation Comments

 

             NEUTROPHILS - REL (DIFF) (BEAKER) 65 %                             

      



             (test code = 1359)                                        

 

             LYMPHOCYTES - REL (DIFF) (BEAKER) 16 %                             

      



             (test code = 1360)                                        

 

             MONOCYTES - REL (DIFF) (BEAKER) 13 %                               

    



             (test code = 1361)                                        

 

             EOSINOPHILS - REL (DIFF) (BEAKER) 0 %                              

      



             (test code = 1362)                                        

 

             BASOPHILS - REL (DIFF) (BEAKER) 1 %                                

    



             (test code = 1363)                                        

 

             BANDS - REL (DIFF) (BEAKER) (test 5 %          0-10                

      



             code = 1348)                                        

 

             NEUTROPHILS - ABS (DIFF) (BEAKER) 5.27 K/ L    1.80-8.00           

      



             (test code = 1365)                                        

 

             LYMPHOCYTES - ABS (DIFF) (BEAKER) 1.30 K/ L    1.48-4.50    L      

      



             (test code = 1366)                                        

 

             MONOCYTES - ABS (DIFF) (BEAKER) 1.05 K/ L    0.00-1.30             

    



             (test code = 1367)                                        

 

             EOSINOPHILS - ABS (DIFF) (BEAKER) 0.00 K/ L    0.00-0.50           

      



             (test code = 1368)                                        

 

             BASOPHILS - ABS (DIFF) (BEAKER) 0.08 K/ L    0.00-0.20             

    



             (test code = 1369)                                        

 

             BANDS-ABS (DIFF) (BEAKER) (test 0.4 K/ L     0.0-0.8               

    



             code = 1349)                                        

 

             TOTAL COUNTED (BEAKER) (test code = 100                            

        



             1351)                                               

 

             BANDS + SEGMENTED NEUTROPHILS 5.67                                 

  



             (BEAKER) (test code = 1352)                                        

 

             WBC MORPHOLOGY (BEAKER) (test code Normal                          

       



             = 487)                                              

 

             PLT MORPHOLOGY (BEAKER) (test code Normal                          

       



             = 486)                                              

 

             RBC MORPHOLOGY (BEAKER) (test code Normal                          

       



             = 762)                                              



BASIC METABOLIC PANEL-15 06:38:00





             Test Item    Value        Reference Range Interpretation Comments

 

             SODIUM (BEAKER) 135 meq/L    136-145      L            



             (test code = 381)                                        

 

             POTASSIUM (BEAKER) 4.0 meq/L    3.5-5.1                   



             (test code = 379)                                        

 

             CHLORIDE (BEAKER) 103 meq/L                        



             (test code = 382)                                        

 

             CO2 (BEAKER) (test 20 meq/L     22-29        L            



             code = 355)                                         

 

             BLOOD UREA NITROGEN 16 mg/dL     7-21                      



             (BEAKER) (test code                                        



             = 354)                                              

 

             CREATININE (BEAKER) 0.71 mg/dL   0.57-1.25                 



             (test code = 358)                                        

 

             GLUCOSE RANDOM 136 mg/dL           H            



             (BEAKER) (test code                                        



             = 652)                                              

 

             CALCIUM (BEAKER) 8.3 mg/dL    8.4-10.2     L            



             (test code = 697)                                        

 

             EGFR (BEAKER) (test 116 mL/min/1.73                           ESTIM

ATED GFR IS



             code = 1092) sq m                                   NOT AS ACCURATE

 AS



                                                                 CREATININE



                                                                 CLEARANCE IN



                                                                 PREDICTING



                                                                 GLOMERULAR



                                                                 FILTRATION RATE

.



                                                                 ESTIMATED GFR I

S



                                                                 NOT APPLICABLE 

FOR



                                                                 DIALYSIS PATIEN

TS.



 ID - RUTHANN MMAGNESIUM2021-04-15 06:38:00





             Test Item    Value        Reference Range Interpretation Comments

 

             MAGNESIUM (BEAKER) (test code = 2.1 mg/dL    1.6-2.6               

    



             627)                                                



 ID - RUTHANN MPHOSPHORUS2021-04-15 06:38:00





             Test Item    Value        Reference Range Interpretation Comments

 

             PHOSPHORUS (BEAKER) (test code = 4.3 mg/dL    2.3-4.7              

     



             604)                                                



 ID - RUTHANN MPOCT-GLUCOSE [id number] 05:37:00





             Test Item    Value        Reference Range Interpretation Comments

 

             POC-GLUCOSE METER 135 mg/dL           H            : TESTED A

T [facility] [address]



             (BEAKER) (test code =                                        [place] [facility],



             [address])                                               [id number]:



                                                                 /Techni

ayla ID



                                                                 = [id number] for Ad

ams,



                                                                 Kimetra



HIGH SENSITIVITY TROPONIN -04-15 04:34:00





             Test Item    Value        Reference Range Interpretation Comments

 

             HIGH SENSITIVITY < pg/ml      See_Comment                [Automated

 message]



             TROPONIN I (test code =                                        The 

system which



             [id number])                                            generated this 

result



                                                                 transmitted ref

erence



                                                                 range: <=35. Th

e



                                                                 reference range

 was not



                                                                 used to interpr

et this



                                                                 result as



                                                                 normal/abnormal

.



 ID - RUTHANN MThe  STAT High Sensitivity Troponin-I results 
should be used in conjunction with other diagnostic information such as ECG, 
clinical observations and information, and patient symptoms to aid in the 
diagnosis of MI.LACTIC ACID, ARTERIAL2021-04-15 04:17:00





             Test Item    Value        Reference Range Interpretation Comments

 

             LACTATE BLOOD ARTERIAL (2) 0.8 mmol/L   0.5-2.2                   



             (BEAKER) (test code = 2874)                                        



 ID - RUTHANN MPROTHROMBIN TIME/INR2021-04-15 04:16:00





             Test Item    Value        Reference Range Interpretation Comments

 

             PROTIME (BEAKER) 15.7 seconds 11.9-14.2    H            



             (test code = 759)                                        

 

             INR (BEAKER) (test 1.30         See_Comment                [Automat

ed message]



             code = 370)                                         The system whic

h



                                                                 generated this 

result



                                                                 transmitted ref

erence



                                                                 range: <=5.90. 

The



                                                                 reference range

 was



                                                                 not used to int

erpret



                                                                 this result as



                                                                 normal/abnormal

.



Effective 2019: PT Reference Range ChangeNew: 11.9-14.2 Previous: 11.7-
14.7RECOMMENDED COUMADIN/WARFARIN INR THERAPY RANGESSTANDARD DOSE: 2.0-3.0 
Includes: PROPHYLAXIS for venous thrombosis, systemic embolization; TREATMENT 
for venous thrombosis and/or pulmonary embolus.HIGH RISK: Target INR is 2.5-3.5 
for patients wiht mechanical heart valves.BLOOD GAS, ARTERIAL2021-04-15 03:55:00





             Test Item    Value        Reference Range Interpretation Comments

 

             PH ARTERIAL (BEAKER) (test code = 7.39         7.35-7.45           

      



             383)                                                

 

             PCO2 ARTERIAL (BEAKER) (test code 39 mm Hg     35-45               

      



             = 384)                                              

 

             PO2 ARTERIAL (BEAKER) (test code 137 mm Hg    80-90        H       

     



             = 385)                                              

 

             O2 SATURATION ARTERIAL (BEAKER) 98.7 %       96.0-97.0    H        

    



             (test code = 386)                                        

 

             HCO3 ARTERIAL (BEAKER) (test code 23 mmol/L    21-29               

      



             = 388)                                              

 

             BASE EXCESS ARTERIAL (BEAKER) -1.6 mmol/L  -2.0-3.0                

  



             (test code = 387)                                        

 

             PATIENT TEMPERATURE (BEAKER) 37.0                                  

 



             (test code = 1818)                                        

 

             FIO2 (BEAKER) (test code = 1819) 40.0                              

     



CTA, CHEST, ABDOMEN - PELVIS, FOR DISSECTION2021-04-15 01:35:00Unlisted Reason 
for Exam - Click Yes and Enter Reason Below-&gt;Yesleft sided chest pain, 
possible dissection seen on echoUnlisted Reason for Exam-&gt;left sided chest 
pain, concern for dissection seenon echo at bedside
************************************************************[facility] : 1965 Sex: 
M************************************************************FINAL REPORT 
PATIENT ID: [id number] CLINICAL HISTORY: Left sided chest pain, questionable 
aortic dissection seenon bedside echo FINDINGS: Multiple axial images of the 
chest, abdomen and pelvis were performed before and after the uncomplicated 
administration of IV contrast utilizing a CTA protocol. Coronal and sagittal 
reformats were created. 3-D imaging on an independent workstation was also 
performed for optimal visualization of the arterial system in accordance with 
the aortogram protocol. Oral contrast was was not given. This exam was performed
according to our departmental dose-optimization program, whichincludes automated
exposure control, adjustment of the mA and/or kV according to patient size 
and/oruse of the iterative reconstruction technique. Comparison: None. Vascular:
There is no aortic dissection or aneurysm. No periaortic hematoma is present. 
There is no major branch vessel occlusion. Aortic root: 3.5 cmMid ascending 
aorta: 3.4 cmAnterior arch: 3.2 cmPosterior arch: 2.7 cmDescending aorta at left
atrium: 2.7 cmDescending aorta at diaphragmatic hiatus: 2.3 cmAbdominal aorta at
the celiac origin: 2.3 cmAbdominal aorta at the SMA origin: 2.1 cmAbdominal 
aorta at the left renal artery origin: 2.1 cmAbdominal aorta proximal to the 
bifurcation: 1.4 cmLeft common iliac artery: 1.0 cmRight common iliac artery: 
0.8 cm The coronary arteries originate as expected and appear grossly patent. 
The great vessels are normal in distribution and appearance. The celiac axis and
SMA are patent. The right hepatic artery arises from the SMA. There are 2 right 
renal arteries and a single left renal artery or its The LEONARDO, aortic and iliac 
bifurcations are patent. Chest: Lung parenchyma and pleural spaces: There is a 
moderate to large, loculated left pleural effusion, simple in density. There is 
adjacent compressive atelectasis in the left lung. There are paraseptal 
emphysematous changes in the lung apices. No pneumothorax. 7 mm noncalcified 
nodule in the lingula. Tracheobronchial tree: No significant findings. Pulmonary
vasculature: No significant findings. Cardiac contours and great vessels: No 
significant findings. Mediastinum: No significant findings. Lymph Nodes: 
Prominent lymph nodes in the mediastinum and left hilum. A prevascular 
mediastinal node has a short axis diameter of 1.0 cm. A left hilar lymph node 
measures 1.5 cm. Skeleton: No acute abnormality. Abdomen and pelvis: Liver: No 
significant findings. Gallbladder and biliary tree: No significant findings. 
Spleen: No significant findings. Adrenal Glands: No significant findings. 
Kidneys and ureters: No significant findings. Stomach and Duodenum: No 
significant findings. Pancreas: No significant findings. Bowel: No significant 
findings. Appendix: Normal. Bladder: The urinary bladder is distended with the 
bladder dome above the level of the umbilicus. There are several bladder 
diverticula. Reproductive organs: No significant findings. Other: No free air, 
fluid or adenopathy Skeleton: Age indeterminate, mild compression deformity of 
the superior endplate of L1. IMPRESSION: No aortic dissection, aneurysm, 
periaortic hematoma or major branch vessel occlusion. Moderate to large, 
loculated left pleural effusion with subjacent atelectasis versus pneumonitis. 
Prominent mediastinal and left hilar lymph nodes may be reactive. Primary 
lymphoproliferative or metastatic malignancy should be excluded on follow-up. 
Distended urinary bladder with multiple diverticula suggesting chronic outlet 
obstruction/lateral dysfunction. Urologic evaluation can be obtained, as 
indicated. 7 mm lingular nodule. Please see below for published recommendations 
for follow-up. 2017 Fleischner Society Recommendations for Single Solid Lung 
Nodule Follow-Up based on size (average of long- and short-axis diameters) &lt;6
mm Low-Risk Patient: No routine follow-up &lt;6 mm High-Risk Patient: Optional 
CT at 12 months 6-8 mm Low-Risk Patient: CT at 6-12 months then [address] CT at 
18-24 months6-8 mm High-Risk Patient: CT at 6-12 [address] then CT at 18-24 months 
&gt;8 mm Low-Risk Patient: Consider CT, PET/CT or tissue sampling at 3 
months&gt;8 mm High-Risk Patient: Same as for low-risk patient Signed: Dhruv Werner MDReport Verified Date/Time: 04/15/2021 01:35:53 Electronically signed by: 
DHRUV WERNER M.D. on 04/15/2021 01:35 AMTHROMBOELASTOGRAPH (TEG)2021-04-15 
01:21:00





             Test Item    Value        Reference Range Interpretation Comments

 

             TEG ACTIVATED CLOTTING TIME 4.8 minutes  4.0-7.0                   



             (BEAKER) (test code = 1407)                                        

 

             TEG FIBRINOGEN ACTIVITY (BEAKER) 72.4 degrees 61.0-73.0            

     



             (test code = 1408)                                        

 

             TEG PLT. AGGREGATION (BEAKER) 45.9 MM      55.0-65.0    L          

  



             (test code = 1409)                                        

 

             TEG FIBRINOLYSIS (BEAKER) (test 0.0 %        0.0-5.0               

    



             code = 1410)                                        

 

             TGH ACTIVATED CLOTTING TIME 5.8 minutes  4.0-7.0                   



             (BEAKER) (test code = 1411)                                        

 

             TGH FIBRINOGEN ACTIVITY (BEAKER) 68.9 degrees 61.0-73.0            

     



             (test code = 1412)                                        

 

             TGH PLT. AGGREGATION (BEAKER) 49.2 MM      55.0-65.0    L          

  



             (test code = 1413)                                        

 

             TGH FIBRINOLYSIS (BEAKER) (test 0.0 %        0.0-5.0               

    



             code = 1414)                                        



RAD, CHEST, 1 VIEW, NON DEPT2021-04-15 00:09:00Reason for exam:-&gt;new admit: 
pleural effusion dx OSH, respiratory insuffciencyShould this be performed at the
bedside?-&gt;Yes************************************************************[facility] : 1965 Sex: 
M************************************************************FINAL REPORT 
PATIENT ID: [id number] History: Respiratory insufficiency, pleural effusion 
diagnosed at outside facility. Comparison: None. Findings: 2 frontal images of 
the chest are submitted. The examination is limited by low lung volumes. There 
is a moderate to large left pleural collection. There is patchy opacification of
the left mid and lower lung, which may reflect atelectasis. Pneumonitis should 
be excluded clinically. An underlying pulmonary lesion could be obscured. The 
cardiac silhouette is partiallyobscured by opacification of the left mid and 
lower lung. There is ill-defined prominence of the left superior mediastinum for
which a perihilar mass, adenopathy, vascular ectasia or loculated effusionare 
considerations. There is no acute bony abnormality. Further evaluation with CT 
chest is recommended. Signed: Dhruv Werner MDReport Verified Date/Time: 
04/15/2021 00:09:55 Electronically signed by:DHRUV WERNER M.D. on 04/15/2021 
12:09 [id number] 23:32:00





             Test Item    Value        Reference Range Interpretation Comments

 

             FIBRINOGEN LEVEL (BEAKER) (test 871 mg/dl    225-434      H        

    



             code = 658)                                         



[id number] 23:32:00





             Test Item    Value        Reference Range Interpretation Comments

 

             PROTIME (BEAKER) (test 15.1 seconds 11.9-14.2    H            



             code = 759)                                         

 

             INR (BEAKER) (test 1.22         See_Comment                [Automat

ed



             code = 370)                                         message] The sy

stem



                                                                 which generated



                                                                 this result



                                                                 transmitted



                                                                 reference range

:



                                                                 <=5.90. The



                                                                 reference range

 was



                                                                 not used to



                                                                 interpret this



                                                                 result as



                                                                 normal/abnormal

.

 

             PARTIAL THROMBOPLASTIN 34.0 seconds 22.5-36.0                 



             TIME (BEAKER) (test                                        



             code = 760)                                         



Effective 2019: PT Reference Range ChangeNew: 11.9-14.2 Previous: 11.7-
14.7RECOMMENDED COUMADIN/WARFARIN INR THERAPY RANGESSTANDARD DOSE: 2.0-3.0 
Includes: PROPHYLAXIS for venous thrombosis, systemic embolization; TREATMENT 
for venous thrombosis and/or pulmonary embolus.HIGH RISK: Target INR is 2.5-3.5 
for patients wiht mechanical heart valves.HIGH SENSITIVITY TROPONIN -98-97 
23:26:00





             Test Item    Value        Reference Range Interpretation Comments

 

             HIGH SENSITIVITY 5 pg/ml      See_Comment                [Automated

 message]



             TROPONIN I (test code =                                        The 

system which



             1402293)                                            generated this 

result



                                                                 transmitted ref

erence



                                                                 range: <=35. Th

e



                                                                 reference range

 was not



                                                                 used to interpr

et this



                                                                 result as



                                                                 normal/abnormal

.



 ID - PIAYA LThe  STAT High Sensitivity Troponin-I results 
should be used in conjunction with other diagnostic information such as ECG, 
clinical observations and information, and patient symptoms to aid in the 
diagnosis of MI.LACTIC ACID, [id number] 23:24:00





             Test Item    Value        Reference Range Interpretation Comments

 

             LACTATE BLOOD VENOUS 1.02 mmol/L  0.50-2.20                 Specime

n slightly



             (2) (BEAKER) (test                                        hemolyzed



             code = 2872)                                        



 ID - [id number] 23:20:00





             Test Item    Value        Reference Range Interpretation Comments

 

             MAGNESIUM (BEAKER) 2.1 mg/dL    1.6-2.6                   Specimen 

slightly



             (test code = 627)                                        hemolyzed



 ID - [id number] 23:20:00





             Test Item    Value        Reference Range Interpretation Comments

 

             PHOSPHORUS (BEAKER) 4.0 mg/dL    2.3-4.7                   Specimen

 slightly



             (test code = 604)                                        hemolyzed



 ID - BSBASIC METABOLIC [id number] 23:20:00





             Test Item    Value        Reference Range Interpretation Comments

 

             SODIUM (BEAKER) 136 meq/L    136-145                   



             (test code = 381)                                        

 

             POTASSIUM (BEAKER) 4.3 meq/L    3.5-5.1                   Specimen 

slightly



             (test code = 379)                                        hemolyzed

 

             CHLORIDE (BEAKER) 103 meq/L                        



             (test code = 382)                                        

 

             CO2 (BEAKER) (test 21 meq/L     22-29        L            



             code = 355)                                         

 

             BLOOD UREA NITROGEN 16 mg/dL     7-21                      



             (BEAKER) (test code                                        



             = 354)                                              

 

             CREATININE (BEAKER) 0.72 mg/dL   0.57-1.25                 Specimen

 slightly



             (test code = 358)                                        hemolyzed

 

             GLUCOSE RANDOM 131 mg/dL           H            



             (BEAKER) (test code                                        



             = 652)                                              

 

             CALCIUM (BEAKER) 8.6 mg/dL    8.4-10.2                  



             (test code = 697)                                        

 

             EGFR (BEAKER) (test 114 mL/min/1.73                           ESTIM

ATED GFR IS



             code = 1092) sq m                                   NOT AS ACCURATE

 AS



                                                                 CREATININE



                                                                 CLEARANCE IN



                                                                 PREDICTING



                                                                 GLOMERULAR



                                                                 FILTRATION RATE

.



                                                                 ESTIMATED GFR I

S



                                                                 NOT APPLICABLE 

FOR



                                                                 DIALYSIS PATIEN

TS.



 ID - BSCALCIUM, [id number] 23:05:00





             Test Item    Value        Reference Range Interpretation Comments

 

             CALCIUM IONIZED (BEAKER) (test 1.07 mmol/L  1.12-1.27    L         

   



             code = 698)                                         

 

             PH, BLOOD (BEAKER) (test code = 7.40                               

    



             1810)                                               



CBC W/PLT COUNT &amp; AUTO [id number] 22:59:00





             Test Item    Value        Reference Range Interpretation Comments

 

             WHITE BLOOD CELL COUNT (BEAKER) 8.7 K/ L     3.5-10.5              

    



             (test code = 775)                                        

 

             RED BLOOD CELL COUNT (BEAKER) 3.00 M/ L    4.63-6.08    L          

  



             (test code = 761)                                        

 

             HEMOGLOBIN (BEAKER) (test code = 10.3 GM/DL   13.7-17.5    L       

     



             410)                                                

 

             HEMATOCRIT (BEAKER) (test code = 30.7 %       40.1-51.0    L       

     



             411)                                                

 

             MEAN CORPUSCULAR VOLUME (BEAKER) 102.3 fL     79.0-92.2    H       

     



             (test code = 753)                                        

 

             MEAN CORPUSCULAR HEMOGLOBIN 34.3 pg      25.7-32.2    H            



             (BEAKER) (test code = 751)                                        

 

             MEAN CORPUSCULAR HEMOGLOBIN CONC 33.6 GM/DL   32.3-36.5            

     



             (BEAKER) (test code = 752)                                        

 

             RED CELL DISTRIBUTION WIDTH 14.2 %       11.6-14.4                 



             (BEAKER) (test code = 412)                                        

 

             PLATELET COUNT (BEAKER) (test code 27 K/CU MM   150-450      L     

       



             = 756)                                              

 

             MEAN PLATELET VOLUME (BEAKER) 12.0 fL      9.4-12.4                

  



             (test code = 754)                                        

 

             NUCLEATED RED BLOOD CELLS (BEAKER) 0 /100 WBC   0-0                

       



             (test code = 413)                                        

 

             NEUTROPHILS RELATIVE PERCENT 67 %                                  

 



             (BEAKER) (test code = 429)                                        

 

             LYMPHOCYTES RELATIVE PERCENT 19 %                                  

 



             (BEAKER) (test code = 430)                                        

 

             MONOCYTES RELATIVE PERCENT 13 %                                   



             (BEAKER) (test code = 431)                                        

 

             EOSINOPHILS RELATIVE PERCENT 0 %                                   

 



             (BEAKER) (test code = 432)                                        

 

             BASOPHILS RELATIVE PERCENT 0 %                                    



             (BEAKER) (test code = 437)                                        

 

             NEUTROPHILS ABSOLUTE COUNT 5.84 K/ L    1.78-5.38    H            



             (BEAKER) (test code = 670)                                        

 

             LYMPHOCYTES ABSOLUTE COUNT 1.66 K/ L    1.32-3.57                 



             (BEAKER) (test code = 414)                                        

 

             MONOCYTES ABSOLUTE COUNT (BEAKER) 1.11 K/ L    0.30-0.82    H      

      



             (test code = 415)                                        

 

             EOSINOPHILS ABSOLUTE COUNT 0.01 K/ L    0.04-0.54    L            



             (BEAKER) (test code = 416)                                        

 

             BASOPHILS ABSOLUTE COUNT (BEAKER) 0.01 K/ L    0.01-0.08           

      



             (test code = 417)                                        

 

             IMMATURE GRANULOCYTES-RELATIVE 1 %          0-1                    

   



             PERCENT (BEAKER) (test code =                                      

  



             2801)

## 2023-01-18 NOTE — RAD REPORT
EXAM DESCRIPTION:  RAD - Chest Single View - 1/18/2023 8:51 am

 

CLINICAL HISTORY:  Dyspnea

Chest pain.

 

COMPARISON:  Chest Single View dated 1/11/2023; Chest Pa And Lat (2 Views) dated 7/29/2021; Chest Pa 
And Lat (2 Views) dated 5/25/2021; Chest Single View dated 4/14/2021

 

FINDINGS:  Portable technique limits examination quality.

 

There is a small to moderate patchy opacity in the medial right lung base most compatible with infilt
rate/pneumonia. The heart is mildly enlarged. No displaced fractures.

 

IMPRESSION:  Medial right lung base infiltrate/pneumonia.

## 2023-01-19 NOTE — EKG
Test Date:    2023-01-18               Test Time:    08:50:02

Technician:   MACARIO                                    

                                                     

MEASUREMENT RESULTS:                                       

Intervals:                                           

Rate:         77                                     

NC:           144                                    

QRSD:         82                                     

QT:           364                                    

QTc:          411                                    

Axis:                                                

P:            37                                     

NC:           144                                    

QRS:          25                                     

T:            57                                     

                                                     

INTERPRETIVE STATEMENTS:                                       

                                                     

Normal sinus rhythm

Possible Left atrial enlargement

Borderline ECG

Compared to ECG 01/11/2023 09:18:47

Atrial flutter no longer present

ST (T wave) deviation no longer present



Electronically Signed On 01-19-23 15:32:31 CST by Landon Florence